# Patient Record
Sex: FEMALE | Race: WHITE | NOT HISPANIC OR LATINO | Employment: PART TIME | URBAN - METROPOLITAN AREA
[De-identification: names, ages, dates, MRNs, and addresses within clinical notes are randomized per-mention and may not be internally consistent; named-entity substitution may affect disease eponyms.]

---

## 2017-06-07 ENCOUNTER — APPOINTMENT (OUTPATIENT)
Dept: PREADMISSION TESTING | Facility: HOSPITAL | Age: 32
End: 2017-06-07

## 2017-06-07 VITALS
TEMPERATURE: 100 F | RESPIRATION RATE: 16 BRPM | HEART RATE: 81 BPM | BODY MASS INDEX: 26.66 KG/M2 | DIASTOLIC BLOOD PRESSURE: 79 MMHG | OXYGEN SATURATION: 100 % | SYSTOLIC BLOOD PRESSURE: 113 MMHG | HEIGHT: 65 IN | WEIGHT: 160 LBS

## 2017-06-07 LAB
ANION GAP SERPL CALCULATED.3IONS-SCNC: 14.5 MMOL/L
BUN BLD-MCNC: 11 MG/DL (ref 6–20)
BUN/CREAT SERPL: 13.1 (ref 7–25)
CALCIUM SPEC-SCNC: 9.4 MG/DL (ref 8.6–10.5)
CHLORIDE SERPL-SCNC: 100 MMOL/L (ref 98–107)
CO2 SERPL-SCNC: 25.5 MMOL/L (ref 22–29)
CREAT BLD-MCNC: 0.84 MG/DL (ref 0.57–1)
DEPRECATED RDW RBC AUTO: 39.6 FL (ref 37–54)
ERYTHROCYTE [DISTWIDTH] IN BLOOD BY AUTOMATED COUNT: 13.1 % (ref 11.7–13)
GFR SERPL CREATININE-BSD FRML MDRD: 78 ML/MIN/1.73
GLUCOSE BLD-MCNC: 81 MG/DL (ref 65–99)
HCT VFR BLD AUTO: 38.5 % (ref 35.6–45.5)
HGB BLD-MCNC: 13 G/DL (ref 11.9–15.5)
MCH RBC QN AUTO: 28.5 PG (ref 26.9–32)
MCHC RBC AUTO-ENTMCNC: 33.8 G/DL (ref 32.4–36.3)
MCV RBC AUTO: 84.4 FL (ref 80.5–98.2)
PLATELET # BLD AUTO: 233 10*3/MM3 (ref 140–500)
PMV BLD AUTO: 10.5 FL (ref 6–12)
POTASSIUM BLD-SCNC: 4.1 MMOL/L (ref 3.5–5.2)
RBC # BLD AUTO: 4.56 10*6/MM3 (ref 3.9–5.2)
SODIUM BLD-SCNC: 140 MMOL/L (ref 136–145)
WBC NRBC COR # BLD: 5.99 10*3/MM3 (ref 4.5–10.7)

## 2017-06-07 PROCEDURE — 80048 BASIC METABOLIC PNL TOTAL CA: CPT | Performed by: OBSTETRICS & GYNECOLOGY

## 2017-06-07 PROCEDURE — 85027 COMPLETE CBC AUTOMATED: CPT | Performed by: OBSTETRICS & GYNECOLOGY

## 2017-06-07 PROCEDURE — 36415 COLL VENOUS BLD VENIPUNCTURE: CPT

## 2017-06-07 RX ORDER — MINOCYCLINE HYDROCHLORIDE 100 MG/1
100 CAPSULE ORAL 2 TIMES DAILY
COMMUNITY
End: 2021-10-11

## 2017-06-07 RX ORDER — LEVOMEFOLATE CALCIUM 15 MG
15 TABLET ORAL DAILY
COMMUNITY
End: 2023-01-13

## 2017-06-07 RX ORDER — OXYBUTYNIN CHLORIDE 10 MG/1
10 TABLET, EXTENDED RELEASE ORAL DAILY
COMMUNITY
End: 2021-10-11

## 2017-06-07 RX ORDER — VENLAFAXINE 75 MG/1
75 TABLET ORAL DAILY
COMMUNITY
End: 2021-10-11

## 2017-06-07 RX ORDER — ALBUTEROL SULFATE 90 UG/1
2 AEROSOL, METERED RESPIRATORY (INHALATION) EVERY 4 HOURS PRN
COMMUNITY
End: 2021-10-11 | Stop reason: SDUPTHER

## 2017-06-07 RX ORDER — BUTALBITAL, ACETAMINOPHEN AND CAFFEINE 50; 325; 40 MG/1; MG/1; MG/1
1 TABLET ORAL EVERY 4 HOURS PRN
COMMUNITY
End: 2021-09-08 | Stop reason: SDUPTHER

## 2017-06-07 NOTE — DISCHARGE INSTRUCTIONS
Take the following medications the morning of surgery with a small sip of water:    QVAR  ALBUTEROL     General Instructions:  • Do not eat solid food after midnight the night before surgery.  • You may drink clear liquids day of surgery but must stop at least one hour before your hospital arrival time @ 0430 AM STOP DRINKING!!  • It is beneficial for you to have a clear drink that contains carbohydrates the day of surgery.  We suggest a 20 ounce bottle of Gatorade or Powerade for non-diabetic patients or a 20 ounce bottle of G2 or Powerade Zero for diabetic patients. (Pediatric patients, are not advised to drink a 20 ounce carbohydrate drink)    Clear liquids are liquids you can see through.  Nothing red in color.     Plain water                               Sports drinks  Sodas                                   Gelatin (Jell-O)  Fruit juices without pulp such as white grape juice and apple juice  Popsicles that contain no fruit or yogurt  Tea or coffee (no cream or milk added)  Gatorade / Powerade  G2 / Powerade Zero    • Patients who avoid smoking, chewing tobacco and alcohol for 4 weeks prior to surgery have a reduced risk of post-operative complications.  Quit smoking as many days before surgery as you can.  • Do not smoke, use chewing tobacco or drink alcohol the day of surgery. .  • Bring any papers given to you in the doctor’s office.  • Wear clean comfortable clothes and socks.  • Do not wear contact lenses or make-up.  Bring a case for your glasses.   • Bring crutches or walker if applicable.  • Leave all other valuables and jewelry at home.  • The Pre-Admission Testing nurse will instruct you to bring medications if unable to obtain an accurate list in Pre-Admission Testing.        Preventing a Surgical Site Infection:  • For 2 to 3 days before surgery, avoid shaving with a razor because the razor can irritate skin and make it easier to develop an infection.  • The night prior to surgery sleep in a clean  bed with clean clothing.  Do not allow pets to sleep with you.  • Shower on the morning of surgery using a fresh bar of anti-bacterial soap (such as Dial) and clean washcloth.  Dry with a clean towel and dress in clean clothing.  • Ask your surgeon if you will be receiving antibiotics prior to surgery.  • Make sure you, your family, and all healthcare providers clean their hands with soap and water or an alcohol based hand  before caring for you or your wound.    Day of surgery:06- ARRIVE @ 0530 AM REPORT TO THE MAIN OR   Upon arrival, a Pre-op nurse and Anesthesiologist will review your health history, obtain vital signs, and answer questions you may have.  The only belongings needed at this time will be your home medications.  If you are staying overnight your family can leave the rest of your belongings in the car and bring them to your room later.  A Pre-op nurse will start an IV and you may receive medication in preparation for surgery, including something to help you relax.  Your family will be able to see you in the Pre-op area.  While you are in surgery your family should notify the waiting room  if they leave the waiting room area and provide a contact phone number.    Please be aware that surgery does come with discomfort.  We want to make every effort to control your discomfort so please discuss any uncontrolled symptoms with your nurse.   Your doctor will most likely have prescribed pain medications.      If you are going home after surgery you will receive individualized written care instructions before being discharged.  A responsible adult must drive you to and from the hospital on the day of your surgery and stay with you for 24 hours.    If you are staying overnight following surgery, you will be transported to your hospital room following the recovery period.  [facility] has all private rooms.    If you have any questions please call Pre-Admission Testing  at [phone].  Deductibles and co-payments are collected on the day of service. Please be prepared to pay the required co-pay, deductible or deposit on the day of service as defined by your plan.

## 2017-06-14 RX ORDER — CEFAZOLIN SODIUM 2 G/100ML
2 INJECTION, SOLUTION INTRAVENOUS ONCE
Status: COMPLETED | OUTPATIENT
Start: 2017-06-15 | End: 2017-06-15

## 2017-06-15 ENCOUNTER — HOSPITAL ENCOUNTER (OUTPATIENT)
Facility: HOSPITAL | Age: 32
Setting detail: HOSPITAL OUTPATIENT SURGERY
Discharge: HOME | End: 2017-06-15
Attending: OBSTETRICS & GYNECOLOGY | Admitting: OBSTETRICS & GYNECOLOGY

## 2017-06-15 ENCOUNTER — ANESTHESIA (OUTPATIENT)
Dept: PERIOP | Facility: HOSPITAL | Age: 32
End: 2017-06-15

## 2017-06-15 ENCOUNTER — ANESTHESIA EVENT (OUTPATIENT)
Dept: PERIOP | Facility: HOSPITAL | Age: 32
End: 2017-06-15

## 2017-06-15 VITALS
DIASTOLIC BLOOD PRESSURE: 79 MMHG | SYSTOLIC BLOOD PRESSURE: 120 MMHG | BODY MASS INDEX: 26.57 KG/M2 | RESPIRATION RATE: 16 BRPM | OXYGEN SATURATION: 97 % | HEIGHT: 65 IN | HEART RATE: 99 BPM | WEIGHT: 159.5 LBS | TEMPERATURE: 97.6 F

## 2017-06-15 PROCEDURE — 25010000003 CEFAZOLIN IN DEXTROSE 2-4 GM/100ML-% SOLUTION: Performed by: OBSTETRICS & GYNECOLOGY

## 2017-06-15 PROCEDURE — 25010000002 MIDAZOLAM PER 1 MG: Performed by: ANESTHESIOLOGY

## 2017-06-15 PROCEDURE — 25010000002 NEOSTIGMINE PER 0.5 MG: Performed by: NURSE ANESTHETIST, CERTIFIED REGISTERED

## 2017-06-15 PROCEDURE — 25010000002 FENTANYL CITRATE (PF) 100 MCG/2ML SOLUTION: Performed by: NURSE ANESTHETIST, CERTIFIED REGISTERED

## 2017-06-15 PROCEDURE — 25010000002 PROMETHAZINE PER 50 MG: Performed by: NURSE ANESTHETIST, CERTIFIED REGISTERED

## 2017-06-15 PROCEDURE — 25010000002 MEPERIDINE 25 MG/0.5ML SOLUTION: Performed by: NURSE ANESTHETIST, CERTIFIED REGISTERED

## 2017-06-15 PROCEDURE — 25010000002 PROPOFOL 10 MG/ML EMULSION: Performed by: NURSE ANESTHETIST, CERTIFIED REGISTERED

## 2017-06-15 PROCEDURE — 25010000002 HYDROMORPHONE PER 4 MG: Performed by: NURSE ANESTHETIST, CERTIFIED REGISTERED

## 2017-06-15 PROCEDURE — 25010000002 ONDANSETRON PER 1 MG: Performed by: NURSE ANESTHETIST, CERTIFIED REGISTERED

## 2017-06-15 PROCEDURE — C1771 REP DEV, URINARY, W/SLING: HCPCS | Performed by: OBSTETRICS & GYNECOLOGY

## 2017-06-15 PROCEDURE — 25010000002 EPINEPHRINE 1 MG/ML SOLUTION: Performed by: OBSTETRICS & GYNECOLOGY

## 2017-06-15 PROCEDURE — 25010000002 KETOROLAC TROMETHAMINE PER 15 MG

## 2017-06-15 DEVICE — IMPLANTABLE DEVICE: Type: IMPLANTABLE DEVICE | Status: FUNCTIONAL

## 2017-06-15 RX ORDER — FENTANYL CITRATE 50 UG/ML
INJECTION, SOLUTION INTRAMUSCULAR; INTRAVENOUS AS NEEDED
Status: DISCONTINUED | OUTPATIENT
Start: 2017-06-15 | End: 2017-06-15 | Stop reason: SURG

## 2017-06-15 RX ORDER — FENTANYL CITRATE 50 UG/ML
50 INJECTION, SOLUTION INTRAMUSCULAR; INTRAVENOUS
Status: DISCONTINUED | OUTPATIENT
Start: 2017-06-15 | End: 2017-06-15 | Stop reason: HOSPADM

## 2017-06-15 RX ORDER — GLYCOPYRROLATE 0.2 MG/ML
INJECTION INTRAMUSCULAR; INTRAVENOUS AS NEEDED
Status: DISCONTINUED | OUTPATIENT
Start: 2017-06-15 | End: 2017-06-15 | Stop reason: SURG

## 2017-06-15 RX ORDER — PROPOFOL 10 MG/ML
VIAL (ML) INTRAVENOUS AS NEEDED
Status: DISCONTINUED | OUTPATIENT
Start: 2017-06-15 | End: 2017-06-15 | Stop reason: SURG

## 2017-06-15 RX ORDER — ONDANSETRON 2 MG/ML
4 INJECTION INTRAMUSCULAR; INTRAVENOUS ONCE AS NEEDED
Status: DISCONTINUED | OUTPATIENT
Start: 2017-06-15 | End: 2017-06-15 | Stop reason: HOSPADM

## 2017-06-15 RX ORDER — MIDAZOLAM HYDROCHLORIDE 1 MG/ML
1 INJECTION INTRAMUSCULAR; INTRAVENOUS
Status: DISCONTINUED | OUTPATIENT
Start: 2017-06-15 | End: 2017-06-15 | Stop reason: HOSPADM

## 2017-06-15 RX ORDER — MIDAZOLAM HYDROCHLORIDE 1 MG/ML
2 INJECTION INTRAMUSCULAR; INTRAVENOUS
Status: DISCONTINUED | OUTPATIENT
Start: 2017-06-15 | End: 2017-06-15 | Stop reason: HOSPADM

## 2017-06-15 RX ORDER — FAMOTIDINE 10 MG/ML
20 INJECTION, SOLUTION INTRAVENOUS ONCE
Status: COMPLETED | OUTPATIENT
Start: 2017-06-15 | End: 2017-06-15

## 2017-06-15 RX ORDER — HYDROCODONE BITARTRATE AND ACETAMINOPHEN 5; 325 MG/1; MG/1
1-2 TABLET ORAL EVERY 4 HOURS PRN
Qty: 40 TABLET | Refills: 0 | OUTPATIENT
Start: 2017-06-15 | End: 2017-06-15 | Stop reason: HOSPADM

## 2017-06-15 RX ORDER — HYDROCODONE BITARTRATE AND ACETAMINOPHEN 5; 325 MG/1; MG/1
1 TABLET ORAL ONCE AS NEEDED
Status: COMPLETED | OUTPATIENT
Start: 2017-06-15 | End: 2017-06-15

## 2017-06-15 RX ORDER — PROMETHAZINE HYDROCHLORIDE 25 MG/ML
12.5 INJECTION, SOLUTION INTRAMUSCULAR; INTRAVENOUS ONCE AS NEEDED
Status: COMPLETED | OUTPATIENT
Start: 2017-06-15 | End: 2017-06-15

## 2017-06-15 RX ORDER — PROMETHAZINE HYDROCHLORIDE 25 MG/ML
6.25 INJECTION, SOLUTION INTRAMUSCULAR; INTRAVENOUS ONCE AS NEEDED
Status: DISCONTINUED | OUTPATIENT
Start: 2017-06-15 | End: 2017-06-15 | Stop reason: HOSPADM

## 2017-06-15 RX ORDER — KETOROLAC TROMETHAMINE 30 MG/ML
INJECTION, SOLUTION INTRAMUSCULAR; INTRAVENOUS
Status: COMPLETED
Start: 2017-06-15 | End: 2017-06-15

## 2017-06-15 RX ORDER — PROMETHAZINE HYDROCHLORIDE 25 MG/1
25 SUPPOSITORY RECTAL ONCE AS NEEDED
Status: DISCONTINUED | OUTPATIENT
Start: 2017-06-15 | End: 2017-06-15 | Stop reason: HOSPADM

## 2017-06-15 RX ORDER — PROMETHAZINE HYDROCHLORIDE 25 MG/1
25 TABLET ORAL ONCE AS NEEDED
Status: COMPLETED | OUTPATIENT
Start: 2017-06-15 | End: 2017-06-15

## 2017-06-15 RX ORDER — PROMETHAZINE HYDROCHLORIDE 25 MG/1
25 TABLET ORAL ONCE AS NEEDED
Status: DISCONTINUED | OUTPATIENT
Start: 2017-06-15 | End: 2017-06-15 | Stop reason: HOSPADM

## 2017-06-15 RX ORDER — LIDOCAINE HYDROCHLORIDE 20 MG/ML
INJECTION, SOLUTION INFILTRATION; PERINEURAL AS NEEDED
Status: DISCONTINUED | OUTPATIENT
Start: 2017-06-15 | End: 2017-06-15 | Stop reason: SURG

## 2017-06-15 RX ORDER — PROMETHAZINE HYDROCHLORIDE 25 MG/1
25 SUPPOSITORY RECTAL ONCE AS NEEDED
Status: COMPLETED | OUTPATIENT
Start: 2017-06-15 | End: 2017-06-15

## 2017-06-15 RX ORDER — LABETALOL HYDROCHLORIDE 5 MG/ML
5 INJECTION, SOLUTION INTRAVENOUS
Status: DISCONTINUED | OUTPATIENT
Start: 2017-06-15 | End: 2017-06-15 | Stop reason: HOSPADM

## 2017-06-15 RX ORDER — SODIUM CHLORIDE, SODIUM LACTATE, POTASSIUM CHLORIDE, CALCIUM CHLORIDE 600; 310; 30; 20 MG/100ML; MG/100ML; MG/100ML; MG/100ML
9 INJECTION, SOLUTION INTRAVENOUS CONTINUOUS
Status: DISCONTINUED | OUTPATIENT
Start: 2017-06-15 | End: 2017-06-15 | Stop reason: HOSPADM

## 2017-06-15 RX ORDER — SODIUM CHLORIDE, SODIUM LACTATE, POTASSIUM CHLORIDE, CALCIUM CHLORIDE 600; 310; 30; 20 MG/100ML; MG/100ML; MG/100ML; MG/100ML
100 INJECTION, SOLUTION INTRAVENOUS CONTINUOUS
Status: DISCONTINUED | OUTPATIENT
Start: 2017-06-15 | End: 2017-06-15 | Stop reason: HOSPADM

## 2017-06-15 RX ORDER — KETOROLAC TROMETHAMINE 30 MG/ML
30 INJECTION, SOLUTION INTRAMUSCULAR; INTRAVENOUS EVERY 6 HOURS PRN
Status: COMPLETED | OUTPATIENT
Start: 2017-06-15 | End: 2017-06-15

## 2017-06-15 RX ORDER — HYDRALAZINE HYDROCHLORIDE 20 MG/ML
5 INJECTION INTRAMUSCULAR; INTRAVENOUS
Status: DISCONTINUED | OUTPATIENT
Start: 2017-06-15 | End: 2017-06-15 | Stop reason: HOSPADM

## 2017-06-15 RX ORDER — ALBUTEROL SULFATE 2.5 MG/3ML
2.5 SOLUTION RESPIRATORY (INHALATION) ONCE AS NEEDED
Status: DISCONTINUED | OUTPATIENT
Start: 2017-06-15 | End: 2017-06-15 | Stop reason: HOSPADM

## 2017-06-15 RX ORDER — ROCURONIUM BROMIDE 10 MG/ML
INJECTION, SOLUTION INTRAVENOUS AS NEEDED
Status: DISCONTINUED | OUTPATIENT
Start: 2017-06-15 | End: 2017-06-15 | Stop reason: SURG

## 2017-06-15 RX ORDER — HYDROMORPHONE HYDROCHLORIDE 1 MG/ML
0.5 INJECTION, SOLUTION INTRAMUSCULAR; INTRAVENOUS; SUBCUTANEOUS
Status: DISCONTINUED | OUTPATIENT
Start: 2017-06-15 | End: 2017-06-15 | Stop reason: HOSPADM

## 2017-06-15 RX ORDER — SODIUM CHLORIDE 0.9 % (FLUSH) 0.9 %
1-10 SYRINGE (ML) INJECTION AS NEEDED
Status: DISCONTINUED | OUTPATIENT
Start: 2017-06-15 | End: 2017-06-15 | Stop reason: HOSPADM

## 2017-06-15 RX ORDER — ONDANSETRON 2 MG/ML
INJECTION INTRAMUSCULAR; INTRAVENOUS AS NEEDED
Status: DISCONTINUED | OUTPATIENT
Start: 2017-06-15 | End: 2017-06-15 | Stop reason: SURG

## 2017-06-15 RX ORDER — OXYCODONE HYDROCHLORIDE AND ACETAMINOPHEN 5; 325 MG/1; MG/1
1 TABLET ORAL EVERY 4 HOURS PRN
Qty: 40 TABLET | Refills: 0 | Status: SHIPPED | OUTPATIENT
Start: 2017-06-15 | End: 2018-06-15

## 2017-06-15 RX ORDER — EPINEPHRINE 1 MG/ML
INJECTION INTRAMUSCULAR; INTRAVENOUS; SUBCUTANEOUS AS NEEDED
Status: DISCONTINUED | OUTPATIENT
Start: 2017-06-15 | End: 2017-06-15 | Stop reason: HOSPADM

## 2017-06-15 RX ADMIN — FENTANYL CITRATE 50 MCG: 50 INJECTION INTRAMUSCULAR; INTRAVENOUS at 09:55

## 2017-06-15 RX ADMIN — SODIUM CHLORIDE, POTASSIUM CHLORIDE, SODIUM LACTATE AND CALCIUM CHLORIDE: 600; 310; 30; 20 INJECTION, SOLUTION INTRAVENOUS at 08:56

## 2017-06-15 RX ADMIN — ONDANSETRON 4 MG: 2 INJECTION INTRAMUSCULAR; INTRAVENOUS at 08:59

## 2017-06-15 RX ADMIN — ROCURONIUM BROMIDE 35 MG: 10 INJECTION INTRAVENOUS at 07:50

## 2017-06-15 RX ADMIN — NEOSTIGMINE METHYLSULFATE 4 MG: 1 INJECTION INTRAMUSCULAR; INTRAVENOUS; SUBCUTANEOUS at 08:59

## 2017-06-15 RX ADMIN — PROMETHAZINE HYDROCHLORIDE 12.5 MG: 25 INJECTION, SOLUTION INTRAMUSCULAR; INTRAVENOUS at 12:12

## 2017-06-15 RX ADMIN — LIDOCAINE HYDROCHLORIDE 60 MG: 20 INJECTION, SOLUTION INFILTRATION; PERINEURAL at 07:50

## 2017-06-15 RX ADMIN — SODIUM CHLORIDE, POTASSIUM CHLORIDE, SODIUM LACTATE AND CALCIUM CHLORIDE 9 ML/HR: 600; 310; 30; 20 INJECTION, SOLUTION INTRAVENOUS at 06:54

## 2017-06-15 RX ADMIN — FAMOTIDINE 20 MG: 10 INJECTION, SOLUTION INTRAVENOUS at 06:54

## 2017-06-15 RX ADMIN — FENTANYL CITRATE 50 MCG: 50 INJECTION INTRAMUSCULAR; INTRAVENOUS at 09:47

## 2017-06-15 RX ADMIN — KETOROLAC TROMETHAMINE 30 MG: 30 INJECTION, SOLUTION INTRAMUSCULAR; INTRAVENOUS at 10:49

## 2017-06-15 RX ADMIN — HYDROCODONE BITARTRATE AND ACETAMINOPHEN 1 TABLET: 5; 325 TABLET ORAL at 09:40

## 2017-06-15 RX ADMIN — FENTANYL CITRATE 100 MCG: 50 INJECTION INTRAMUSCULAR; INTRAVENOUS at 07:50

## 2017-06-15 RX ADMIN — MEPERIDINE HYDROCHLORIDE 12.5 MG: 25 INJECTION, SOLUTION INTRAMUSCULAR; INTRAVENOUS; SUBCUTANEOUS at 10:03

## 2017-06-15 RX ADMIN — MIDAZOLAM 2 MG: 1 INJECTION INTRAMUSCULAR; INTRAVENOUS at 06:54

## 2017-06-15 RX ADMIN — PROPOFOL 170 MG: 10 INJECTION, EMULSION INTRAVENOUS at 07:50

## 2017-06-15 RX ADMIN — CEFAZOLIN SODIUM 2 G: 2 INJECTION, SOLUTION INTRAVENOUS at 07:43

## 2017-06-15 RX ADMIN — GLYCOPYRROLATE 0.6 MG: 0.2 INJECTION INTRAMUSCULAR; INTRAVENOUS at 08:59

## 2017-06-15 RX ADMIN — KETOROLAC TROMETHAMINE 30 MG: 30 INJECTION, SOLUTION INTRAMUSCULAR at 10:49

## 2017-06-15 RX ADMIN — HYDROMORPHONE HYDROCHLORIDE 0.5 MG: 1 INJECTION, SOLUTION INTRAMUSCULAR; INTRAVENOUS; SUBCUTANEOUS at 10:13

## 2017-06-15 RX ADMIN — HYDROMORPHONE HYDROCHLORIDE 0.5 MG: 1 INJECTION, SOLUTION INTRAMUSCULAR; INTRAVENOUS; SUBCUTANEOUS at 10:38

## 2017-06-15 RX ADMIN — MEPERIDINE HYDROCHLORIDE 12.5 MG: 25 INJECTION, SOLUTION INTRAMUSCULAR; INTRAVENOUS; SUBCUTANEOUS at 09:40

## 2017-06-15 RX ADMIN — HYDROMORPHONE HYDROCHLORIDE 0.5 MG: 1 INJECTION, SOLUTION INTRAMUSCULAR; INTRAVENOUS; SUBCUTANEOUS at 10:24

## 2017-06-15 NOTE — ANESTHESIA POSTPROCEDURE EVALUATION
Patient: Audra SHELLOSA    Procedure Summary     Date Anesthesia Start Anesthesia Stop Room / Location    06/15/17 0743 0921  [facility] / [facility]       Procedure Diagnosis Surgeon Provider    RETROPUBIC SLING W/CYSTOSCOPY  (N/A Vagina); DILATATION AND CURETTAGE HYSTEROSCOPY NOVASURE ENDOMETRIAL ABLATION (N/A Vagina) No diagnosis on file. MD William Lopez MD          Anesthesia Type: general  Last vitals  /64 (06/15/17 1155)    Temp      Pulse 94 (06/15/17 1155)   Resp 16 (06/15/17 1155)    SpO2 96 % (06/15/17 1140)      Post Anesthesia Care and Evaluation    Patient location during evaluation: PACU  Patient participation: complete - patient participated  Level of consciousness: sleepy but conscious  Pain score: 0  Pain management: adequate  Airway patency: patent  Anesthetic complications: No anesthetic complications    Cardiovascular status: acceptable  Respiratory status: acceptable  Hydration status: acceptable

## 2017-06-15 NOTE — PLAN OF CARE
Problem: Perioperative Period (Adult)  Goal: Signs and Symptoms of Listed Potential Problems Will be Absent or Manageable (Perioperative Period)  Outcome: Ongoing (interventions implemented as appropriate)  Patient arrived to PACU in good condition. Oral airway in place with blowby oxygen. Vital signs stable. No signs/symptoms of pain at this time. Will continue to monitor.

## 2017-06-15 NOTE — DISCHARGE INSTRUCTIONS
You were given Norco (pain pills) today at 9:40 am.  You may have the first dose of Roxicet at 1:40 pm today.    Outpatient Surgery Guidelines, Adult  Outpatient procedures are those for which the person having the procedure is allowed to go home the same day as the procedure. Various procedures are done on an outpatient basis. You should follow some general guidelines if you will be having an outpatient procedure.  AFTER THE  PROCEDURE  After surgery, you will be taken to a recovery area, where your progress will be monitored. If there are no complications, you will be allowed to go home when you are awake, stable, and taking fluids well. You may have numbness around the surgical site. Healing will take some time. You will have tenderness at the surgical site and may have some swelling and bruising. You may also have some nausea.  HOME CARE INSTRUCTIONS  · Do not drive for 24 hours, or as directed by your health care provider. Do not drive while taking prescription pain medicines.  · Do not drink alcohol for 24 hours.  · Do not make important decisions or sign legal documents for 24 hours.  · Plan on having a responsible adult stay with your for 24 hours following your procedure.  · You may resume a normal diet and activities as directed.  · Do not lift anything heavier than 10 pounds (4.5 kg) or play contact sports until your health care provider says it is okay.  · Only take over-the-counter or prescription medicines as directed by your health care provider.  · Follow up with your health care provider as directed.  SEEK MEDICAL CARE IF:  · You have increased bleeding (more than a small spot) from the surgical site.  · You have redness, swelling, or increasing pain in the wound.  · You see pus coming from the wound.  · You have a fever > 101.  · You notice a bad smell coming from the wound or dressing.  · You feel lightheaded or faint.  · You develop a rash.  · You have trouble breathing.  · You develop  allergies.  MAKE SURE YOU:  · Understand these instructions.  · Will watch your condition.  · Will get help right away if you are not doing well or get worse.

## 2017-06-15 NOTE — H&P
Chief complaint:  Abnormal uterine bleeding and stress urinary incontinence    History of present illness:  The patient is a 32-year-old P4 premenopausal female with a long standing history of abnormal uterine bleeding who has failed multiple medical therapy or self discontinued medical therapy due to adverse side effects. Pelvic ultrasound showed no uterine fibroids.   Also, she is complaining of leakage of urine with cough, laugh, sneeze, and physical activities for the past two years.  The leakage of urine is affecting her quality of life.  She had urodynamic testing, with urethral pressure consistent with intrinsic sphincter deficiency.   Patient wanted to proceed with the convservative surgical management with preservation of uterus, so she chose endometrial ablation.  She expressed understanding of the risks involved.  She has had bilateral salpingectomy for sterilization and expressed that she has completed her childbearing and does not desire future fertility.  Also, will place a retropubic mid-urethral sling.      Review of Systems - General ROS: negative for - chills, fatigue, fever, weight gain or weight loss  Psychological ROS: negative for - suicidal ideations  Respiratory ROS: no cough, shortness of breath, or wheezing  Cardiovascular ROS: no chest pain or dyspnea on exertion  Gastrointestinal ROS: no abdominal pain, change in bowel habits, or black or bloody stools  Genito-Urinary ROS: no dysuria, trouble voiding, or hematuria, positive for incontinence and heavy menstrual bleeding  Musculoskeletal ROS: negative for - gait disturbance, muscle pain or muscular weakness  Neurological ROS: no TIA or stroke symptoms    Past Medical History:   Diagnosis Date   • Anxiety and depression    • Asthma    • Bipolar 1 disorder    • Cystic acne    • Intrinsic sphincter deficiency (ISD)    • Iron deficiency    • Migraine    • Stress incontinence    • Thyroid dysfunction, postpartum (-OX- AGAIN)  (3-2014-TO )    RESOLVED     Past Surgical History:   Procedure Laterality Date   • DILATATION AND CURETTAGE     • LAPAROSCOPIC CHOLECYSTECTOMY  02/2007   • TUBAL ABDOMINAL LIGATION       No current facility-administered medications for this encounter.     Current Outpatient Prescriptions:   •  albuterol (PROVENTIL HFA;VENTOLIN HFA) 108 (90 BASE) MCG/ACT inhaler, Inhale 2 puffs Every 4 (Four) Hours As Needed for Wheezing., Disp: , Rfl:   •  beclomethasone (QVAR) 80 MCG/ACT inhaler, Inhale 1 puff 2 (Two) Times a Day., Disp: , Rfl:   •  butalbital-acetaminophen-caffeine (FIORICET) -40 MG per tablet, Take 1 tablet by mouth Every 4 (Four) Hours As Needed for Headache., Disp: , Rfl:   •  l-methylfolate 15 MG tablet tablet, Take 15 mg by mouth Daily., Disp: , Rfl:   •  minocycline (MINOCIN,DYNACIN) 100 MG capsule, Take 100 mg by mouth 2 (Two) Times a Day., Disp: , Rfl:   •  oxybutynin XL (DITROPAN-XL) 10 MG 24 hr tablet, Take 10 mg by mouth Daily., Disp: , Rfl:   •  Pediatric Multivitamins-Iron (FLINTSTONES PLUS IRON) chewable tablet, Chew 1 tablet Daily. HOLD PRIOR TO SURGERY, Disp: , Rfl:   •  venlafaxine (EFFEXOR) 75 MG tablet, Take 75 mg by mouth Daily., Disp: , Rfl:      No Known Allergies     Family History   Problem Relation Age of Onset   • Malig Hyperthermia Neg Hx      Social History     Social History   • Marital status: Legally      Spouse name: N/A   • Number of children: N/A   • Years of education: N/A     Occupational History   • Not on file.     Social History Main Topics   • Smoking status: Former Smoker     Packs/day: 1.50     Years: 6.00     Types: Cigarettes     Quit date: 6/7/2011   • Smokeless tobacco: Not on file   • Alcohol use Yes      Comment: SPECIAL OCCASION DRINKER   • Drug use: Defer   • Sexual activity: Defer     Other Topics Concern   • Not on file     Social History Narrative   • No narrative on file       Physical Exam    General:  No acute distress  Head:   Atraumatic and normocephalic  Neck:  Supple  Cardiovascular:  RRR, S1 and S2 normal  Lungs:  Clear to auscultation bilaterally  Abdomen:  Soft, nontentder, non-distended, normal bowel sounds  Genitourinary:  deferred  Musculoskeletal:  No edema or calf tenderness.  Normal sensation and moved all fingers and toes, normal strength  Neuroglogical:  AAO x 3  Psychological:  Appropriate mood and affect    Assessment and Plan    32-year-old P4 with abnormal uterine bleeding NOS and stress urinary incontinence due to intrinsic sphincter deficiency    Prophylactic antibiotics  SCDs  Verify consent  I have reviewed the notes, assessments, and/or procedures performed by Dr Wolfe, I concur with her/his documentation of Audra MCLEOD.

## 2017-06-15 NOTE — PLAN OF CARE
Problem: Patient Care Overview (Adult)  Goal: Plan of Care Review  Outcome: Ongoing (interventions implemented as appropriate)    06/15/17 0641   Coping/Psychosocial Response Interventions   Plan Of Care Reviewed With patient   Patient Care Overview   Progress no change       Goal: Adult Individualization and Mutuality  Outcome: Ongoing (interventions implemented as appropriate)    06/15/17 0641   Individualization   Patient Specific Preferences CAll Audra   Patient Specific Goals return home, periods more normal, normal urine flow   Patient Specific Interventions teach S&S of infection   Mutuality/Individual Preferences   What Anxieties, Fears or Concerns Do You Have About Your Health or Care? none   What Questions Do You Have About Your Health or Care? none   What Information Would Help Us Give You More Personalized Care? none       Goal: Discharge Needs Assessment  Outcome: Ongoing (interventions implemented as appropriate)    06/15/17 0641   Discharge Needs Assessment   Concerns To Be Addressed denies needs/concerns at this time   Readmission Within The Last 30 Days no previous admission in last 30 days   Current Health   Anticipated Changes Related to Illness none   Self-Care   Equipment Currently Used at Home none         Problem: Perioperative Period (Adult)  Goal: Signs and Symptoms of Listed Potential Problems Will be Absent or Manageable (Perioperative Period)  Outcome: Ongoing (interventions implemented as appropriate)    06/15/17 0641   Perioperative Period   Problems Assessed (Perioperative Period) pain;hypoxia/hypoxemia;situational response   Problems Present (Perioperative Period) none

## 2017-06-15 NOTE — OP NOTE
DATE OF PROCEDURE:  06/15/2017    PREOPERATIVE DIAGNOSES:  1.  Menorrhagia.  2.  Intrinsic sphincter deficiency.    POSTOPERATIVE DIAGNOSES:   1.  Menorrhagia.  2.  Intrinsic sphincter deficiency.    PROCEDURES PERFORMED:  1.  Retropubic midurethral sling.   2.  Dilatation and curettage.    3.  Hysteroscopy.    4.  NovaSure endometrial ablation.     SURGEON: Hebert Wallace MD    ANESTHESIA: General.      ASSISTANTS:  1.  Wily Zapata  2.   Antonette Vieira.     ESTIMATED BLOOD LOSS:  Minimal.      URINE VOIDED:  100 mL.     SPECIMENS: None.     DRAINS: None.     FINDINGS: Normal appearing endometrial.  The endometrium was sounded to 10 cm, length of cavity was 6.5 cm, width was 4.5 cm, power 161 sun, and time was 1 minute and 14 seconds.     COMPLICATIONS: None.     DESCRIPTION OF PROCEDURE: Once risks, benefits, and alternatives were discussed with the patient and consent was obtained, the patient was taken to the operating room with IV fluids running and placed on the operating room table in the supine position. Anesthesia was obtained. Anesthesia was felt to be adequate.  She was placed in lithotomy position using the Sundeep stirrups, prepped, and draped in the usual sterile fashion for vaginal surgery. Allis clamp was attached to the anterior lip of the cervix. Uterus was sounded to total of 10 cm.  Cervix was dilated.  Hysteroscope inserted. Both ostia were observed.  The uterine lining noted to be pretty normal. The hysteroscope was used to measure the length of the cavity. It was noted to be 6.5 cm. At that point, the hysteroscope was removed. The NovaSure was inserted without difficulty.  Procedure was completed without issue.  Once this was completed, we proceeded onto the retropubic sling. After injecting suprapubically the space of Retzius with epinephrine and saline solution, we then injected transvaginally. Next a 1 cm incision was made between 2 Allis clamps that were placed at the midurethra  through the vaginal mucosa. Metzenbaum scissors were used to dissect bilaterally to the pubic symphysis.  Then the AMS Retropubic RetroArc sling was used. It was placed into the vaginal incision and retrogression relay delivered it to the abdominal wall suprapubically. The same was then performed on the opposite side. Cystoscopy was performed at this point with no evidence of perforation of the bladder. Next, the mesh was attached. A large packing forceps was placed between the urethra and the mesh to ensure tension-free placement. The forceps were left in place until the plastic sheath was removed. The vaginal incision was then closed out in 2 layers, one with a mattress suture of 2-0 Vicryl suture followed by a running 3-0 Vicryl suture. These adhesions were closed with a Monocryl followed by a Steri-Strip. At the conclusion, there was excellent hemostasis. Sponge, instrument, and lap counts were correct x2. Patient was taken out of lithotomy position, awakened from anesthesia, and sent to the recovery room in excellent condition.       REYMUNDO Lopez:elvin  D:   06/15/2017 09:14:21  T:   06/15/2017 09:37:18  Job ID:   [id number]  Document ID:   [id number]  cc:

## 2017-06-15 NOTE — PLAN OF CARE
Problem: Patient Care Overview (Adult)  Goal: Plan of Care Review  Outcome: Outcome(s) achieved Date Met:  06/15/17    06/15/17 1459   Coping/Psychosocial Response Interventions   Plan Of Care Reviewed With patient;mother   Patient Care Overview   Progress improving   Outcome Evaluation   Outcome Summary/Follow up Plan ready for discharge       Goal: Adult Individualization and Mutuality  Outcome: Outcome(s) achieved Date Met:  06/15/17  Goal: Discharge Needs Assessment  Outcome: Outcome(s) achieved Date Met:  06/15/17    Problem: Perioperative Period (Adult)  Goal: Signs and Symptoms of Listed Potential Problems Will be Absent or Manageable (Perioperative Period)  Outcome: Outcome(s) achieved Date Met:  06/15/17

## 2017-06-15 NOTE — ANESTHESIA PREPROCEDURE EVALUATION
Anesthesia Evaluation     Patient summary reviewed and Nursing notes reviewed   no history of anesthetic complications:  NPO Solid Status: > 6 hours  NPO Liquid Status: > 6 hours     Airway   Mallampati: II  TM distance: >3 FB  Neck ROM: full  no difficulty expected  Dental - normal exam     Pulmonary - normal exam    breath sounds clear to auscultation  (+) asthma,   (-) rhonchi, decreased breath sounds, wheezes, rales, stridor  Cardiovascular - negative cardio ROS and normal exam    Rhythm: regular  Rate: normal    (-) murmur, weak pulses, friction rub, systolic click, carotid bruits, JVD, peripheral edema      Neuro/Psych  (+) psychiatric history Bipolar, Anxiety and Depression,    GI/Hepatic/Renal/Endo - negative ROS     Musculoskeletal (-) negative ROS    Abdominal  - normal exam    Abdomen: soft.   Substance History - negative use     OB/GYN negative ob/gyn ROS         Other - negative ROS                                       Anesthesia Plan    ASA 2     general     intravenous induction   Anesthetic plan and risks discussed with patient.

## 2017-06-15 NOTE — ANESTHESIA PROCEDURE NOTES
Airway  Urgency: elective    Airway not difficult    General Information and Staff    Patient location during procedure: OR  Anesthesiologist: ESTRELLA MARR  CRNA: NABILA MCKEON    Indications and Patient Condition  Indications for airway management: airway protection    Preoxygenated: yes  MILS not maintained throughout  Mask difficulty assessment: 1 - vent by mask    Final Airway Details  Final airway type: endotracheal airway      Successful airway: ETT  Cuffed: yes   Successful intubation technique: direct laryngoscopy  Facilitating devices/methods: intubating stylet  Endotracheal tube insertion site: oral  Blade: Bud  Blade size: #3  ETT size: 7.0 mm  Cormack-Lehane Classification: grade I - full view of glottis  Placement verified by: chest auscultation   Cuff volume (mL): 8  Measured from: lips  ETT to lips (cm): 21  Number of attempts at approach: 1    Additional Comments  PreO2 100% face mask, IV induction, easy mask, DVL x1, cords noted, tube through, cuff up, EBBSH, +etCO2, = chest movement, tube secured in place, atraumatic, teeth and lips intact as preop.

## 2017-06-15 NOTE — PERIOPERATIVE NURSING NOTE
16 fr Cheung catheter placed without difficulty under sterile technique. Raine well.  Cheung cath instructions sheet explained & demonstrated with patient & mother, verbalizes understanding.  Leg bag changing explained & demo done but patient states she has a large bladder & refuses the leg bag but will take it home in case she changes her mind. Cheung cath card taken out of kit & given to patient as well, along with extra stat lock & extra alcohol pads to clean tubing tip when emptying cheung bag.

## 2018-02-09 ENCOUNTER — CONVERSION ENCOUNTER (OUTPATIENT)
Dept: INTERNAL MEDICINE | Facility: CLINIC | Age: 33
End: 2018-02-09

## 2018-02-09 ENCOUNTER — OFFICE VISIT CONVERTED (OUTPATIENT)
Dept: INTERNAL MEDICINE | Facility: CLINIC | Age: 33
End: 2018-02-09
Attending: NURSE PRACTITIONER

## 2018-03-12 ENCOUNTER — OFFICE VISIT CONVERTED (OUTPATIENT)
Dept: INTERNAL MEDICINE | Facility: CLINIC | Age: 33
End: 2018-03-12
Attending: NURSE PRACTITIONER

## 2018-04-02 ENCOUNTER — OFFICE VISIT CONVERTED (OUTPATIENT)
Dept: INTERNAL MEDICINE | Facility: CLINIC | Age: 33
End: 2018-04-02
Attending: PHYSICIAN ASSISTANT

## 2018-04-10 ENCOUNTER — CONVERSION ENCOUNTER (OUTPATIENT)
Dept: INTERNAL MEDICINE | Facility: CLINIC | Age: 33
End: 2018-04-10

## 2018-04-10 ENCOUNTER — OFFICE VISIT CONVERTED (OUTPATIENT)
Dept: INTERNAL MEDICINE | Facility: CLINIC | Age: 33
End: 2018-04-10
Attending: PHYSICIAN ASSISTANT

## 2018-04-27 ENCOUNTER — OFFICE VISIT CONVERTED (OUTPATIENT)
Dept: INTERNAL MEDICINE | Facility: CLINIC | Age: 33
End: 2018-04-27
Attending: NURSE PRACTITIONER

## 2018-05-23 ENCOUNTER — OFFICE VISIT CONVERTED (OUTPATIENT)
Dept: INTERNAL MEDICINE | Facility: CLINIC | Age: 33
End: 2018-05-23
Attending: INTERNAL MEDICINE

## 2018-06-14 ENCOUNTER — OFFICE VISIT CONVERTED (OUTPATIENT)
Dept: INTERNAL MEDICINE | Facility: CLINIC | Age: 33
End: 2018-06-14
Attending: NURSE PRACTITIONER

## 2018-08-31 ENCOUNTER — OFFICE VISIT CONVERTED (OUTPATIENT)
Dept: INTERNAL MEDICINE | Facility: CLINIC | Age: 33
End: 2018-08-31
Attending: NURSE PRACTITIONER

## 2018-08-31 ENCOUNTER — CONVERSION ENCOUNTER (OUTPATIENT)
Dept: INTERNAL MEDICINE | Facility: CLINIC | Age: 33
End: 2018-08-31

## 2019-02-08 ENCOUNTER — OFFICE VISIT CONVERTED (OUTPATIENT)
Dept: INTERNAL MEDICINE | Facility: CLINIC | Age: 34
End: 2019-02-08
Attending: NURSE PRACTITIONER

## 2019-02-08 ENCOUNTER — HOSPITAL ENCOUNTER (OUTPATIENT)
Dept: OTHER | Facility: HOSPITAL | Age: 34
Discharge: HOME | End: 2019-02-08
Attending: NURSE PRACTITIONER

## 2019-02-08 LAB
25(OH)D3 SERPL-MCNC: 19.2 NG/ML (ref 30–100)
ALBUMIN SERPL-MCNC: 4.6 G/DL (ref 3.5–5)
ALBUMIN/GLOB SERPL: 1.8 {RATIO} (ref 1.4–2.6)
ALP SERPL-CCNC: 58 U/L (ref 42–98)
ALT SERPL-CCNC: 15 U/L (ref 10–40)
ANION GAP SERPL CALC-SCNC: 16 MMOL/L (ref 8–19)
AST SERPL-CCNC: 16 U/L (ref 15–50)
BASOPHILS # BLD AUTO: 0.01 10*3/UL (ref 0–0.2)
BASOPHILS NFR BLD AUTO: 0.08 % (ref 0–3)
BILIRUB SERPL-MCNC: 0.4 MG/DL (ref 0.2–1.3)
BUN SERPL-MCNC: 11 MG/DL (ref 5–25)
BUN/CREAT SERPL: 13 {RATIO} (ref 6–20)
CALCIUM SERPL-MCNC: 9.7 MG/DL (ref 8.7–10.4)
CHLORIDE SERPL-SCNC: 102 MMOL/L (ref 99–111)
CONV CO2: 29 MMOL/L (ref 22–32)
CONV TOTAL PROTEIN: 7.1 G/DL (ref 6.3–8.2)
CREAT UR-MCNC: 0.86 MG/DL (ref 0.5–0.9)
EOSINOPHIL # BLD AUTO: 0.16 10*3/UL (ref 0–0.7)
EOSINOPHIL # BLD AUTO: 1.95 % (ref 0–7)
ERYTHROCYTE [DISTWIDTH] IN BLOOD BY AUTOMATED COUNT: 11.9 % (ref 11.5–14.5)
FOLATE SERPL-MCNC: 18.5 NG/ML (ref 4.8–20)
GFR SERPLBLD BASED ON 1.73 SQ M-ARVRAT: >60 ML/MIN/{1.73_M2}
GLOBULIN UR ELPH-MCNC: 2.5 G/DL (ref 2–3.5)
GLUCOSE SERPL-MCNC: 61 MG/DL (ref 65–99)
HBA1C MFR BLD: 13.1 G/DL (ref 12–16)
HCT VFR BLD AUTO: 38.3 % (ref 37–47)
IRON SATN MFR SERPL: 20 % (ref 20–55)
IRON SERPL-MCNC: 78 UG/DL (ref 60–170)
LYMPHOCYTES # BLD AUTO: 1.36 10*3/UL (ref 1–5)
MCH RBC QN AUTO: 29.4 PG (ref 27–31)
MCHC RBC AUTO-ENTMCNC: 34.3 G/DL (ref 33–37)
MCV RBC AUTO: 85.6 FL (ref 81–99)
MONOCYTES # BLD AUTO: 0.45 10*3/UL (ref 0.2–1.2)
MONOCYTES NFR BLD AUTO: 5.41 % (ref 3–10)
NEUTROPHILS # BLD AUTO: 6.36 10*3/UL (ref 2–8)
NEUTROPHILS NFR BLD AUTO: 76.3 % (ref 30–85)
NRBC BLD AUTO-RTO: 0 % (ref 0–0.01)
OSMOLALITY SERPL CALC.SUM OF ELEC: 291 MOSM/KG (ref 273–304)
PLATELET # BLD AUTO: 268 10*3/UL (ref 130–400)
PMV BLD AUTO: 8.2 FL (ref 7.4–10.4)
POTASSIUM SERPL-SCNC: 4.6 MMOL/L (ref 3.5–5.3)
RBC # BLD AUTO: 4.47 10*6/UL (ref 4.2–5.4)
SODIUM SERPL-SCNC: 142 MMOL/L (ref 135–147)
T4 FREE SERPL-MCNC: 1.2 NG/DL (ref 0.9–1.8)
TIBC SERPL-MCNC: 396 UG/DL (ref 245–450)
TRANSFERRIN SERPL-MCNC: 277 MG/DL (ref 250–380)
TSH SERPL-ACNC: 1.21 M[IU]/L (ref 0.27–4.2)
VARIANT LYMPHS NFR BLD MANUAL: 16.3 % (ref 20–45)
VIT B12 SERPL-MCNC: 780 PG/ML (ref 211–911)
WBC # BLD AUTO: 8.34 10*3/UL (ref 4.8–10.8)

## 2019-05-13 ENCOUNTER — OFFICE VISIT CONVERTED (OUTPATIENT)
Dept: INTERNAL MEDICINE | Facility: CLINIC | Age: 34
End: 2019-05-13
Attending: NURSE PRACTITIONER

## 2019-05-13 ENCOUNTER — HOSPITAL ENCOUNTER (OUTPATIENT)
Dept: OTHER | Facility: HOSPITAL | Age: 34
Discharge: HOME | End: 2019-05-13
Attending: NURSE PRACTITIONER

## 2019-05-13 LAB
25(OH)D3 SERPL-MCNC: 29.2 NG/ML (ref 30–100)
ANION GAP SERPL CALC-SCNC: 17 MMOL/L (ref 8–19)
BASOPHILS # BLD AUTO: 0.03 10*3/UL (ref 0–0.2)
BASOPHILS NFR BLD AUTO: 0.5 % (ref 0–3)
BUN SERPL-MCNC: 9 MG/DL (ref 5–25)
BUN/CREAT SERPL: 10 {RATIO} (ref 6–20)
CALCIUM SERPL-MCNC: 8.9 MG/DL (ref 8.7–10.4)
CHLORIDE SERPL-SCNC: 102 MMOL/L (ref 99–111)
CONV ABS IMM GRAN: 0.04 10*3/UL (ref 0–0.2)
CONV CO2: 27 MMOL/L (ref 22–32)
CONV IMMATURE GRAN: 0.7 % (ref 0–1.8)
CREAT UR-MCNC: 0.86 MG/DL (ref 0.5–0.9)
DEPRECATED RDW RBC AUTO: 40.9 FL (ref 36.4–46.3)
EOSINOPHIL # BLD AUTO: 0.13 10*3/UL (ref 0–0.7)
EOSINOPHIL # BLD AUTO: 2.2 % (ref 0–7)
ERYTHROCYTE [DISTWIDTH] IN BLOOD BY AUTOMATED COUNT: 12.4 % (ref 11.7–14.4)
GFR SERPLBLD BASED ON 1.73 SQ M-ARVRAT: >60 ML/MIN/{1.73_M2}
GLUCOSE SERPL-MCNC: 77 MG/DL (ref 65–99)
HBA1C MFR BLD: 12.8 G/DL (ref 12–16)
HCT VFR BLD AUTO: 39.7 % (ref 37–47)
LYMPHOCYTES # BLD AUTO: 1.43 10*3/UL (ref 1–5)
MCH RBC QN AUTO: 29 PG (ref 27–31)
MCHC RBC AUTO-ENTMCNC: 32.2 G/DL (ref 33–37)
MCV RBC AUTO: 89.8 FL (ref 81–99)
MONOCYTES # BLD AUTO: 0.34 10*3/UL (ref 0.2–1.2)
MONOCYTES NFR BLD AUTO: 5.8 % (ref 3–10)
NEUTROPHILS # BLD AUTO: 3.86 10*3/UL (ref 2–8)
NEUTROPHILS NFR BLD AUTO: 66.3 % (ref 30–85)
NRBC CBCN: 0 % (ref 0–0.7)
OSMOLALITY SERPL CALC.SUM OF ELEC: 291 MOSM/KG (ref 273–304)
PLATELET # BLD AUTO: 240 10*3/UL (ref 130–400)
PMV BLD AUTO: 10.9 FL (ref 9.4–12.3)
POTASSIUM SERPL-SCNC: 3.6 MMOL/L (ref 3.5–5.3)
RBC # BLD AUTO: 4.42 10*6/UL (ref 4.2–5.4)
SODIUM SERPL-SCNC: 142 MMOL/L (ref 135–147)
T4 FREE SERPL-MCNC: 1.5 NG/DL (ref 0.9–1.8)
TSH SERPL-ACNC: 1.29 M[IU]/L (ref 0.27–4.2)
VARIANT LYMPHS NFR BLD MANUAL: 24.5 % (ref 20–45)
WBC # BLD AUTO: 5.83 10*3/UL (ref 4.8–10.8)

## 2019-05-31 ENCOUNTER — HOSPITAL ENCOUNTER (OUTPATIENT)
Dept: OTHER | Facility: HOSPITAL | Age: 34
Discharge: HOME | End: 2019-05-31
Attending: NURSE PRACTITIONER

## 2019-07-01 ENCOUNTER — HOSPITAL ENCOUNTER (OUTPATIENT)
Dept: OTHER | Facility: HOSPITAL | Age: 34
Discharge: HOME | End: 2019-07-01

## 2019-07-01 LAB
25(OH)D3 SERPL-MCNC: 26.5 NG/ML (ref 30–100)
ALBUMIN SERPL-MCNC: 4.4 G/DL (ref 3.5–5)
ALBUMIN/GLOB SERPL: 1.7 {RATIO} (ref 1.4–2.6)
ALP SERPL-CCNC: 64 U/L (ref 42–98)
ALT SERPL-CCNC: 16 U/L (ref 10–40)
ANION GAP SERPL CALC-SCNC: 16 MMOL/L (ref 8–19)
AST SERPL-CCNC: 18 U/L (ref 15–50)
BASOPHILS # BLD AUTO: 0.03 10*3/UL (ref 0–0.2)
BASOPHILS NFR BLD AUTO: 0.5 % (ref 0–3)
BILIRUB SERPL-MCNC: 0.45 MG/DL (ref 0.2–1.3)
BUN SERPL-MCNC: 7 MG/DL (ref 5–25)
BUN/CREAT SERPL: 8 {RATIO} (ref 6–20)
CALCIUM SERPL-MCNC: 8.8 MG/DL (ref 8.7–10.4)
CHLORIDE SERPL-SCNC: 105 MMOL/L (ref 99–111)
CONV ABS IMM GRAN: 0.03 10*3/UL (ref 0–0.2)
CONV CO2: 27 MMOL/L (ref 22–32)
CONV IMMATURE GRAN: 0.5 % (ref 0–1.8)
CONV TOTAL PROTEIN: 7 G/DL (ref 6.3–8.2)
CREAT UR-MCNC: 0.93 MG/DL (ref 0.5–0.9)
DEPRECATED RDW RBC AUTO: 40.7 FL (ref 36.4–46.3)
EOSINOPHIL # BLD AUTO: 0.12 10*3/UL (ref 0–0.7)
EOSINOPHIL # BLD AUTO: 1.9 % (ref 0–7)
ERYTHROCYTE [DISTWIDTH] IN BLOOD BY AUTOMATED COUNT: 12.5 % (ref 11.7–14.4)
GFR SERPLBLD BASED ON 1.73 SQ M-ARVRAT: >60 ML/MIN/{1.73_M2}
GLOBULIN UR ELPH-MCNC: 2.6 G/DL (ref 2–3.5)
GLUCOSE SERPL-MCNC: 76 MG/DL (ref 65–99)
HBA1C MFR BLD: 13.4 G/DL (ref 12–16)
HCT VFR BLD AUTO: 40.6 % (ref 37–47)
LYMPHOCYTES # BLD AUTO: 1.57 10*3/UL (ref 1–5)
MCH RBC QN AUTO: 29.6 PG (ref 27–31)
MCHC RBC AUTO-ENTMCNC: 33 G/DL (ref 33–37)
MCV RBC AUTO: 89.6 FL (ref 81–99)
MONOCYTES # BLD AUTO: 0.32 10*3/UL (ref 0.2–1.2)
MONOCYTES NFR BLD AUTO: 5 % (ref 3–10)
NEUTROPHILS # BLD AUTO: 4.27 10*3/UL (ref 2–8)
NEUTROPHILS NFR BLD AUTO: 67.3 % (ref 30–85)
NRBC CBCN: 0 % (ref 0–0.7)
OSMOLALITY SERPL CALC.SUM OF ELEC: 293 MOSM/KG (ref 273–304)
PLATELET # BLD AUTO: 259 10*3/UL (ref 130–400)
PMV BLD AUTO: 10.2 FL (ref 9.4–12.3)
POTASSIUM SERPL-SCNC: 4.7 MMOL/L (ref 3.5–5.3)
RBC # BLD AUTO: 4.53 10*6/UL (ref 4.2–5.4)
SODIUM SERPL-SCNC: 143 MMOL/L (ref 135–147)
TSH SERPL-ACNC: 1.26 M[IU]/L (ref 0.27–4.2)
VARIANT LYMPHS NFR BLD MANUAL: 24.8 % (ref 20–45)
WBC # BLD AUTO: 6.34 10*3/UL (ref 4.8–10.8)

## 2019-07-02 LAB — CONV THYROXINE TOTAL: 6.4 UG/DL (ref 4.5–12)

## 2019-07-05 LAB — T3REVERSE SERPL-MCNC: 19.6 NG/DL (ref 9.2–24.1)

## 2019-07-31 ENCOUNTER — HOSPITAL ENCOUNTER (OUTPATIENT)
Dept: GENERAL RADIOLOGY | Facility: HOSPITAL | Age: 34
Discharge: HOME | End: 2019-07-31

## 2019-08-09 ENCOUNTER — HOSPITAL ENCOUNTER (OUTPATIENT)
Dept: OTHER | Facility: HOSPITAL | Age: 34
Discharge: HOME | End: 2019-08-09
Attending: NURSE PRACTITIONER

## 2019-08-09 ENCOUNTER — OFFICE VISIT CONVERTED (OUTPATIENT)
Dept: INTERNAL MEDICINE | Facility: CLINIC | Age: 34
End: 2019-08-09
Attending: NURSE PRACTITIONER

## 2019-08-19 ENCOUNTER — OFFICE VISIT CONVERTED (OUTPATIENT)
Dept: INTERNAL MEDICINE | Facility: CLINIC | Age: 34
End: 2019-08-19
Attending: NURSE PRACTITIONER

## 2019-08-22 ENCOUNTER — HOSPITAL ENCOUNTER (OUTPATIENT)
Dept: OTHER | Facility: HOSPITAL | Age: 34
Discharge: HOME | End: 2019-08-22
Attending: NURSE PRACTITIONER

## 2019-08-24 LAB — R RICKETTSI IGM TITR SER: 0.32 INDEX (ref 0–0.89)

## 2019-08-26 LAB
B MICROTI DNA BLD QL NAA+PROBE: NOT DETECTED
CONV ANAPLASMA PHAGOCYTOPHILUM PCR: NOT DETECTED
CONV BABESIA SPECIES PCR: NOT DETECTED
CONV EHRLICHIA EWINGII CANIS PCR: NOT DETECTED
CONV EHRLICHIA MURIS LIKE PCR: NOT DETECTED
E CHAFFEENSIS DNA BLD QL NAA+PROBE: NOT DETECTED

## 2019-08-27 LAB — R RICKETTSI IGG SER QL IA: NEGATIVE

## 2019-08-29 ENCOUNTER — HOSPITAL ENCOUNTER (OUTPATIENT)
Dept: PERIOP | Facility: HOSPITAL | Age: 34
Setting detail: HOSPITAL OUTPATIENT SURGERY
Discharge: HOME | End: 2019-08-30
Attending: OBSTETRICS & GYNECOLOGY

## 2019-08-29 LAB
ABO GROUP BLD: NORMAL
BASOPHILS # BLD AUTO: 0.02 10*3/UL (ref 0–0.2)
BASOPHILS NFR BLD AUTO: 0.4 % (ref 0–3)
BLD GP AB SCN SERPL QL: NORMAL
CONV ABD CONTROL: NORMAL
CONV ABS IMM GRAN: 0.03 10*3/UL (ref 0–0.2)
CONV IMMATURE GRAN: 0.6 % (ref 0–1.8)
DEPRECATED RDW RBC AUTO: 38.8 FL (ref 36.4–46.3)
EOSINOPHIL # BLD AUTO: 0.08 10*3/UL (ref 0–0.7)
EOSINOPHIL # BLD AUTO: 1.5 % (ref 0–7)
ERYTHROCYTE [DISTWIDTH] IN BLOOD BY AUTOMATED COUNT: 12 % (ref 11.7–14.4)
HCG INTACT+B SERPL-ACNC: <0.5 M[IU]/ML (ref 0–5)
HCT VFR BLD AUTO: 38.5 % (ref 37–47)
HCT VFR BLD AUTO: 41.2 % (ref 37–47)
HGB BLD-MCNC: 12.6 G/DL (ref 12–16)
HGB BLD-MCNC: 13.5 G/DL (ref 12–16)
LYMPHOCYTES # BLD AUTO: 1.55 10*3/UL (ref 1–5)
LYMPHOCYTES NFR BLD AUTO: 29.6 % (ref 20–45)
MCH RBC QN AUTO: 28.7 PG (ref 27–31)
MCHC RBC AUTO-ENTMCNC: 32.8 G/DL (ref 33–37)
MCV RBC AUTO: 87.5 FL (ref 81–99)
MONOCYTES # BLD AUTO: 0.34 10*3/UL (ref 0.2–1.2)
MONOCYTES NFR BLD AUTO: 6.5 % (ref 3–10)
NEUTROPHILS # BLD AUTO: 3.22 10*3/UL (ref 2–8)
NEUTROPHILS NFR BLD AUTO: 61.4 % (ref 30–85)
NRBC CBCN: 0 % (ref 0–0.7)
PLATELET # BLD AUTO: 239 10*3/UL (ref 130–400)
PMV BLD AUTO: 9.8 FL (ref 9.4–12.3)
RBC # BLD AUTO: 4.71 10*6/UL (ref 4.2–5.4)
RH BLD: NORMAL
WBC # BLD AUTO: 5.24 10*3/UL (ref 4.8–10.8)

## 2019-08-30 LAB
BASOPHILS # BLD AUTO: 0.03 10*3/UL (ref 0–0.2)
BASOPHILS NFR BLD AUTO: 0.3 % (ref 0–3)
CONV ABS IMM GRAN: 0.04 10*3/UL (ref 0–0.2)
CONV IMMATURE GRAN: 0.4 % (ref 0–1.8)
DEPRECATED RDW RBC AUTO: 38.8 FL (ref 36.4–46.3)
EOSINOPHIL # BLD AUTO: 0.06 10*3/UL (ref 0–0.7)
EOSINOPHIL # BLD AUTO: 0.6 % (ref 0–7)
ERYTHROCYTE [DISTWIDTH] IN BLOOD BY AUTOMATED COUNT: 12.1 % (ref 11.7–14.4)
HCT VFR BLD AUTO: 34.2 % (ref 37–47)
HGB BLD-MCNC: 11.3 G/DL (ref 12–16)
LYMPHOCYTES # BLD AUTO: 1.63 10*3/UL (ref 1–5)
LYMPHOCYTES NFR BLD AUTO: 15.8 % (ref 20–45)
MCH RBC QN AUTO: 28.9 PG (ref 27–31)
MCHC RBC AUTO-ENTMCNC: 33 G/DL (ref 33–37)
MCV RBC AUTO: 87.5 FL (ref 81–99)
MONOCYTES # BLD AUTO: 0.58 10*3/UL (ref 0.2–1.2)
MONOCYTES NFR BLD AUTO: 5.6 % (ref 3–10)
NEUTROPHILS # BLD AUTO: 7.95 10*3/UL (ref 2–8)
NEUTROPHILS NFR BLD AUTO: 77.3 % (ref 30–85)
NRBC CBCN: 0 % (ref 0–0.7)
PLATELET # BLD AUTO: 198 10*3/UL (ref 130–400)
PMV BLD AUTO: 10.2 FL (ref 9.4–12.3)
RBC # BLD AUTO: 3.91 10*6/UL (ref 4.2–5.4)
WBC # BLD AUTO: 10.29 10*3/UL (ref 4.8–10.8)

## 2019-11-18 ENCOUNTER — OFFICE VISIT CONVERTED (OUTPATIENT)
Dept: INTERNAL MEDICINE | Facility: CLINIC | Age: 34
End: 2019-11-18
Attending: NURSE PRACTITIONER

## 2019-11-18 ENCOUNTER — HOSPITAL ENCOUNTER (OUTPATIENT)
Dept: OTHER | Facility: HOSPITAL | Age: 34
Discharge: HOME | End: 2019-11-18
Attending: NURSE PRACTITIONER

## 2019-11-18 LAB
25(OH)D3 SERPL-MCNC: 43.8 NG/ML (ref 30–100)
ALBUMIN SERPL-MCNC: 5 G/DL (ref 3.5–5)
ALBUMIN/GLOB SERPL: 1.8 {RATIO} (ref 1.4–2.6)
ALP SERPL-CCNC: 68 U/L (ref 42–98)
ALT SERPL-CCNC: 20 U/L (ref 10–40)
ANION GAP SERPL CALC-SCNC: 19 MMOL/L (ref 8–19)
AST SERPL-CCNC: 24 U/L (ref 15–50)
BASOPHILS # BLD AUTO: 0.03 10*3/UL (ref 0–0.2)
BASOPHILS NFR BLD AUTO: 0.6 % (ref 0–3)
BILIRUB SERPL-MCNC: 0.53 MG/DL (ref 0.2–1.3)
BUN SERPL-MCNC: 8 MG/DL (ref 5–25)
BUN/CREAT SERPL: 9 {RATIO} (ref 6–20)
CALCIUM SERPL-MCNC: 9.6 MG/DL (ref 8.7–10.4)
CHLORIDE SERPL-SCNC: 99 MMOL/L (ref 99–111)
CONV ABS IMM GRAN: 0.02 10*3/UL (ref 0–0.2)
CONV CO2: 23 MMOL/L (ref 22–32)
CONV IMMATURE GRAN: 0.4 % (ref 0–1.8)
CONV TOTAL PROTEIN: 7.8 G/DL (ref 6.3–8.2)
CREAT UR-MCNC: 0.91 MG/DL (ref 0.5–0.9)
DEPRECATED RDW RBC AUTO: 38.1 FL (ref 36.4–46.3)
EOSINOPHIL # BLD AUTO: 0.11 10*3/UL (ref 0–0.7)
EOSINOPHIL # BLD AUTO: 2.1 % (ref 0–7)
ERYTHROCYTE [DISTWIDTH] IN BLOOD BY AUTOMATED COUNT: 12.3 % (ref 11.7–14.4)
GFR SERPLBLD BASED ON 1.73 SQ M-ARVRAT: >60 ML/MIN/{1.73_M2}
GLOBULIN UR ELPH-MCNC: 2.8 G/DL (ref 2–3.5)
GLUCOSE SERPL-MCNC: 84 MG/DL (ref 65–99)
HCT VFR BLD AUTO: 43.8 % (ref 37–47)
HGB BLD-MCNC: 14.7 G/DL (ref 12–16)
IRON SATN MFR SERPL: 30 % (ref 20–55)
IRON SERPL-MCNC: 107 UG/DL (ref 60–170)
LYMPHOCYTES # BLD AUTO: 1.53 10*3/UL (ref 1–5)
LYMPHOCYTES NFR BLD AUTO: 29 % (ref 20–45)
MCH RBC QN AUTO: 28.5 PG (ref 27–31)
MCHC RBC AUTO-ENTMCNC: 33.6 G/DL (ref 33–37)
MCV RBC AUTO: 85 FL (ref 81–99)
MONOCYTES # BLD AUTO: 0.3 10*3/UL (ref 0.2–1.2)
MONOCYTES NFR BLD AUTO: 5.7 % (ref 3–10)
NEUTROPHILS # BLD AUTO: 3.29 10*3/UL (ref 2–8)
NEUTROPHILS NFR BLD AUTO: 62.2 % (ref 30–85)
NRBC CBCN: 0 % (ref 0–0.7)
OSMOLALITY SERPL CALC.SUM OF ELEC: 280 MOSM/KG (ref 273–304)
PLATELET # BLD AUTO: 261 10*3/UL (ref 130–400)
PMV BLD AUTO: 10.6 FL (ref 9.4–12.3)
POTASSIUM SERPL-SCNC: 4.9 MMOL/L (ref 3.5–5.3)
RBC # BLD AUTO: 5.15 10*6/UL (ref 4.2–5.4)
SODIUM SERPL-SCNC: 136 MMOL/L (ref 135–147)
TIBC SERPL-MCNC: 353 UG/DL (ref 245–450)
TRANSFERRIN SERPL-MCNC: 247 MG/DL (ref 250–380)
TSH SERPL-ACNC: 1.34 M[IU]/L (ref 0.27–4.2)
VIT B12 SERPL-MCNC: 846 PG/ML (ref 211–911)
WBC # BLD AUTO: 5.28 10*3/UL (ref 4.8–10.8)

## 2019-12-04 ENCOUNTER — HOSPITAL ENCOUNTER (OUTPATIENT)
Dept: OTHER | Facility: HOSPITAL | Age: 34
Discharge: HOME | End: 2019-12-04
Attending: PHYSICIAN ASSISTANT

## 2019-12-04 ENCOUNTER — OFFICE VISIT CONVERTED (OUTPATIENT)
Dept: INTERNAL MEDICINE | Facility: CLINIC | Age: 34
End: 2019-12-04
Attending: PHYSICIAN ASSISTANT

## 2019-12-04 LAB
APPEARANCE UR: CLEAR
BILIRUB UR QL: NEGATIVE
COLOR UR: YELLOW
CONV BACTERIA: NEGATIVE
CONV COLLECTION SOURCE (UA): ABNORMAL
CONV UROBILINOGEN IN URINE BY AUTOMATED TEST STRIP: 0.2 {EHRLICHU}/DL (ref 0.1–1)
GLUCOSE UR QL: NEGATIVE MG/DL
HGB UR QL STRIP: NEGATIVE
KETONES UR QL STRIP: NEGATIVE MG/DL
LEUKOCYTE ESTERASE UR QL STRIP: ABNORMAL
NITRITE UR QL STRIP: NEGATIVE
PH UR STRIP.AUTO: 8 [PH] (ref 5–8)
PROT UR QL: NEGATIVE MG/DL
RBC #/AREA URNS HPF: ABNORMAL /[HPF]
SP GR UR: 1.01 (ref 1–1.03)
WBC #/AREA URNS HPF: ABNORMAL /[HPF]

## 2019-12-06 LAB — BACTERIA UR CULT: NORMAL

## 2019-12-17 ENCOUNTER — CONVERSION ENCOUNTER (OUTPATIENT)
Dept: OTOLARYNGOLOGY | Facility: CLINIC | Age: 34
End: 2019-12-17

## 2019-12-17 ENCOUNTER — OFFICE VISIT CONVERTED (OUTPATIENT)
Dept: OTOLARYNGOLOGY | Facility: CLINIC | Age: 34
End: 2019-12-17
Attending: OTOLARYNGOLOGY

## 2020-02-06 ENCOUNTER — HOSPITAL ENCOUNTER (OUTPATIENT)
Dept: URGENT CARE | Facility: CLINIC | Age: 35
Discharge: HOME | End: 2020-02-06
Attending: EMERGENCY MEDICINE

## 2020-02-07 ENCOUNTER — OFFICE VISIT CONVERTED (OUTPATIENT)
Dept: INTERNAL MEDICINE | Facility: CLINIC | Age: 35
End: 2020-02-07
Attending: NURSE PRACTITIONER

## 2020-02-14 ENCOUNTER — CONVERSION ENCOUNTER (OUTPATIENT)
Dept: INTERNAL MEDICINE | Facility: CLINIC | Age: 35
End: 2020-02-14

## 2020-02-14 ENCOUNTER — OFFICE VISIT CONVERTED (OUTPATIENT)
Dept: INTERNAL MEDICINE | Facility: CLINIC | Age: 35
End: 2020-02-14
Attending: PHYSICIAN ASSISTANT

## 2020-04-10 ENCOUNTER — TELEMEDICINE CONVERTED (OUTPATIENT)
Dept: INTERNAL MEDICINE | Facility: CLINIC | Age: 35
End: 2020-04-10
Attending: NURSE PRACTITIONER

## 2020-07-01 ENCOUNTER — TELEMEDICINE CONVERTED (OUTPATIENT)
Dept: INTERNAL MEDICINE | Facility: CLINIC | Age: 35
End: 2020-07-01
Attending: PHYSICIAN ASSISTANT

## 2020-07-06 ENCOUNTER — HOSPITAL ENCOUNTER (OUTPATIENT)
Dept: OTHER | Facility: HOSPITAL | Age: 35
Discharge: HOME | End: 2020-07-06
Attending: PHYSICIAN ASSISTANT

## 2020-07-07 LAB
C TRACH RRNA CVX QL NAA+PROBE: NOT DETECTED
N GONORRHOEA DNA SPEC QL NAA+PROBE: NOT DETECTED

## 2020-07-08 NOTE — BRIEF OP NOTE
Prior to immunization administration, verified patients identity using patient s name and date of birth. Please see Immunization Activity for additional information.     Screening Questionnaire for Adult Immunization    Are you sick today?   No   Do you have allergies to medications, food, a vaccine component or latex?   Yes   Have you ever had a serious reaction after receiving a vaccination?   Yes   Do you have a long-term health problem with heart, lung, kidney, or metabolic disease (e.g., diabetes), asthma, a blood disorder, no spleen, complement component deficiency, a cochlear implant, or a spinal fluid leak?  Are you on long-term aspirin therapy?   No   Do you have cancer, leukemia, HIV/AIDS, or any other immune system problem?   Yes   Do you have a parent, brother, or sister with an immune system problem?   No   In the past 3 months, have you taken medications that affect  your immune system, such as prednisone, other steroids, or anticancer drugs; drugs for the treatment of rheumatoid arthritis, Crohn s disease, or psoriasis; or have you had radiation treatments?   No   Have you had a seizure, or a brain or other nervous system problem?   No   During the past year, have you received a transfusion of blood or blood    products, or been given immune (gamma) globulin or antiviral drug?   No   For women: Are you pregnant or is there a chance you could become       pregnant during the next month?   No   Have you received any vaccinations in the past 4 weeks?   No     Immunization questionnaire was positive for at least one answer.  Notified Dr Olvera.        Per orders of Dr. Garcia, injection of Shingrix given by Caitlin Perdue MA. Patient instructed to remain in clinic for 15 minutes afterwards, and to report any adverse reaction to me immediately.       Screening performed by Caitlin Perdue MA on 7/8/2020 at 4:46 PM.     transSVAGINAL TAPING SUSPENSION, DILATATION AND CURETTAGE HYSTEROSCOPY NOVASURE ENDOMETRIAL ABLATION  Procedure Note    Audra MCLEOD  6/15/2017    Pre-op Diagnosis:   * No pre-op diagnosis entered *Menorrhagia  ISD    Post-op Diagnosis:     LUIZ    Procedure/CPT® Codes:      Procedure(s):  RETROPUBIC SLING W/CYSTOSCOPY   DILATATION AND CURETTAGE HYSTEROSCOPY NOVASURE ENDOMETRIAL ABLATION    Surgeon(s):  Hebert Wallace MD    Anesthesia: General    Staff:   Circulator: Shanthi Vieira RN  Scrub Person: Wily Zapata    Estimated Blood Loss: *No blood loss documented*  Urine Voided: * No values recorded between 6/15/2017  7:43 AM and 6/15/2017  8:56 AM *    Specimens:                * No specimens in log *      Drains:    none       Findings: Normal apearing endometrium  Complications: nonne      Hebert Wallace MD     Date: 6/15/2017  Time: 9:06 AM

## 2020-07-21 ENCOUNTER — OFFICE VISIT CONVERTED (OUTPATIENT)
Dept: INTERNAL MEDICINE | Facility: CLINIC | Age: 35
End: 2020-07-21
Attending: NURSE PRACTITIONER

## 2020-07-21 ENCOUNTER — HOSPITAL ENCOUNTER (OUTPATIENT)
Dept: OTHER | Facility: HOSPITAL | Age: 35
Discharge: HOME | End: 2020-07-21
Attending: NURSE PRACTITIONER

## 2020-07-21 LAB
25(OH)D3 SERPL-MCNC: 39.4 NG/ML (ref 30–100)
ALBUMIN SERPL-MCNC: 4.7 G/DL (ref 3.5–5)
ALBUMIN/GLOB SERPL: 1.9 {RATIO} (ref 1.4–2.6)
ALP SERPL-CCNC: 52 U/L (ref 42–98)
ALT SERPL-CCNC: 20 U/L (ref 10–40)
ANION GAP SERPL CALC-SCNC: 20 MMOL/L (ref 8–19)
AST SERPL-CCNC: 22 U/L (ref 15–50)
BASOPHILS # BLD AUTO: 0.03 10*3/UL (ref 0–0.2)
BASOPHILS NFR BLD AUTO: 0.5 % (ref 0–3)
BILIRUB SERPL-MCNC: 0.33 MG/DL (ref 0.2–1.3)
BUN SERPL-MCNC: 11 MG/DL (ref 5–25)
BUN/CREAT SERPL: 12 {RATIO} (ref 6–20)
CALCIUM SERPL-MCNC: 9.1 MG/DL (ref 8.7–10.4)
CHLORIDE SERPL-SCNC: 100 MMOL/L (ref 99–111)
CHOLEST SERPL-MCNC: 157 MG/DL (ref 107–200)
CHOLEST/HDLC SERPL: 2.2 {RATIO} (ref 3–6)
CONV ABS IMM GRAN: 0.02 10*3/UL (ref 0–0.2)
CONV CO2: 22 MMOL/L (ref 22–32)
CONV IMMATURE GRAN: 0.4 % (ref 0–1.8)
CONV TOTAL PROTEIN: 7.2 G/DL (ref 6.3–8.2)
CREAT UR-MCNC: 0.93 MG/DL (ref 0.5–0.9)
DEPRECATED RDW RBC AUTO: 40.6 FL (ref 36.4–46.3)
EOSINOPHIL # BLD AUTO: 0.11 10*3/UL (ref 0–0.7)
EOSINOPHIL # BLD AUTO: 2 % (ref 0–7)
ERYTHROCYTE [DISTWIDTH] IN BLOOD BY AUTOMATED COUNT: 12.5 % (ref 11.7–14.4)
GFR SERPLBLD BASED ON 1.73 SQ M-ARVRAT: >60 ML/MIN/{1.73_M2}
GLOBULIN UR ELPH-MCNC: 2.5 G/DL (ref 2–3.5)
GLUCOSE SERPL-MCNC: 76 MG/DL (ref 65–99)
HCT VFR BLD AUTO: 43.6 % (ref 37–47)
HDLC SERPL-MCNC: 71 MG/DL (ref 40–60)
HGB BLD-MCNC: 14 G/DL (ref 12–16)
IRON SATN MFR SERPL: 23 % (ref 20–55)
IRON SERPL-MCNC: 79 UG/DL (ref 60–170)
LDLC SERPL CALC-MCNC: 69 MG/DL (ref 70–100)
LYMPHOCYTES # BLD AUTO: 1.35 10*3/UL (ref 1–5)
LYMPHOCYTES NFR BLD AUTO: 24.5 % (ref 20–45)
MCH RBC QN AUTO: 28.6 PG (ref 27–31)
MCHC RBC AUTO-ENTMCNC: 32.1 G/DL (ref 33–37)
MCV RBC AUTO: 89 FL (ref 81–99)
MONOCYTES # BLD AUTO: 0.28 10*3/UL (ref 0.2–1.2)
MONOCYTES NFR BLD AUTO: 5.1 % (ref 3–10)
NEUTROPHILS # BLD AUTO: 3.71 10*3/UL (ref 2–8)
NEUTROPHILS NFR BLD AUTO: 67.5 % (ref 30–85)
NRBC CBCN: 0 % (ref 0–0.7)
OSMOLALITY SERPL CALC.SUM OF ELEC: 284 MOSM/KG (ref 273–304)
PLATELET # BLD AUTO: 270 10*3/UL (ref 130–400)
PMV BLD AUTO: 11.2 FL (ref 9.4–12.3)
POTASSIUM SERPL-SCNC: 3.8 MMOL/L (ref 3.5–5.3)
RBC # BLD AUTO: 4.9 10*6/UL (ref 4.2–5.4)
SODIUM SERPL-SCNC: 138 MMOL/L (ref 135–147)
TIBC SERPL-MCNC: 350 UG/DL (ref 245–450)
TRANSFERRIN SERPL-MCNC: 245 MG/DL (ref 250–380)
TRIGL SERPL-MCNC: 84 MG/DL (ref 40–150)
TSH SERPL-ACNC: 1.47 M[IU]/L (ref 0.27–4.2)
VLDLC SERPL-MCNC: 17 MG/DL (ref 5–37)
WBC # BLD AUTO: 5.5 10*3/UL (ref 4.8–10.8)

## 2020-08-10 ENCOUNTER — HOSPITAL ENCOUNTER (OUTPATIENT)
Dept: OTHER | Facility: HOSPITAL | Age: 35
Discharge: HOME | End: 2020-08-10
Attending: NURSE PRACTITIONER

## 2020-08-10 ENCOUNTER — CONVERSION ENCOUNTER (OUTPATIENT)
Dept: INTERNAL MEDICINE | Facility: CLINIC | Age: 35
End: 2020-08-10

## 2020-08-10 ENCOUNTER — OFFICE VISIT CONVERTED (OUTPATIENT)
Dept: INTERNAL MEDICINE | Facility: CLINIC | Age: 35
End: 2020-08-10
Attending: NURSE PRACTITIONER

## 2020-08-11 LAB
CONV MUMPS ANTIBODY IGG: 23.8 AU/ML
HAV AB SER QL IA: NEGATIVE
HBV SURFACE AB SER QL: NON REACTIVE
MEV IGG SER IA-ACNC: 100 AU/ML
RUBV IGG SER-ACNC: 500 [IU]/ML
VZV IGG SER IA-ACNC: 412 INDEX

## 2020-08-20 ENCOUNTER — HOSPITAL ENCOUNTER (OUTPATIENT)
Dept: OTHER | Facility: HOSPITAL | Age: 35
Discharge: HOME | End: 2020-08-20
Attending: NURSE PRACTITIONER

## 2020-09-11 ENCOUNTER — OFFICE VISIT CONVERTED (OUTPATIENT)
Dept: INTERNAL MEDICINE | Facility: CLINIC | Age: 35
End: 2020-09-11
Attending: PHYSICIAN ASSISTANT

## 2020-09-11 ENCOUNTER — HOSPITAL ENCOUNTER (OUTPATIENT)
Dept: OTHER | Facility: HOSPITAL | Age: 35
Discharge: HOME | End: 2020-09-11
Attending: PHYSICIAN ASSISTANT

## 2020-09-11 LAB
ALBUMIN SERPL-MCNC: 5 G/DL (ref 3.5–5)
ALBUMIN/GLOB SERPL: 2 {RATIO} (ref 1.4–2.6)
ALP SERPL-CCNC: 56 U/L (ref 42–98)
ALT SERPL-CCNC: 17 U/L (ref 10–40)
ANION GAP SERPL CALC-SCNC: 16 MMOL/L (ref 8–19)
AST SERPL-CCNC: 22 U/L (ref 15–50)
BASOPHILS # BLD AUTO: 0.03 10*3/UL (ref 0–0.2)
BASOPHILS NFR BLD AUTO: 0.6 % (ref 0–3)
BILIRUB SERPL-MCNC: 0.39 MG/DL (ref 0.2–1.3)
BUN SERPL-MCNC: 12 MG/DL (ref 5–25)
BUN/CREAT SERPL: 12 {RATIO} (ref 6–20)
CALCIUM SERPL-MCNC: 9.6 MG/DL (ref 8.7–10.4)
CHLORIDE SERPL-SCNC: 104 MMOL/L (ref 99–111)
CONV ABS IMM GRAN: 0.01 10*3/UL (ref 0–0.2)
CONV CO2: 25 MMOL/L (ref 22–32)
CONV IMMATURE GRAN: 0.2 % (ref 0–1.8)
CONV TOTAL PROTEIN: 7.5 G/DL (ref 6.3–8.2)
CREAT UR-MCNC: 1 MG/DL (ref 0.5–0.9)
DEPRECATED RDW RBC AUTO: 41.2 FL (ref 36.4–46.3)
EOSINOPHIL # BLD AUTO: 0.08 10*3/UL (ref 0–0.7)
EOSINOPHIL # BLD AUTO: 1.6 % (ref 0–7)
ERYTHROCYTE [DISTWIDTH] IN BLOOD BY AUTOMATED COUNT: 12.5 % (ref 11.7–14.4)
GFR SERPLBLD BASED ON 1.73 SQ M-ARVRAT: >60 ML/MIN/{1.73_M2}
GLOBULIN UR ELPH-MCNC: 2.5 G/DL (ref 2–3.5)
GLUCOSE SERPL-MCNC: 81 MG/DL (ref 65–99)
HCT VFR BLD AUTO: 47.4 % (ref 37–47)
HGB BLD-MCNC: 14.9 G/DL (ref 12–16)
LYMPHOCYTES # BLD AUTO: 1.37 10*3/UL (ref 1–5)
LYMPHOCYTES NFR BLD AUTO: 27.1 % (ref 20–45)
MCH RBC QN AUTO: 28.4 PG (ref 27–31)
MCHC RBC AUTO-ENTMCNC: 31.4 G/DL (ref 33–37)
MCV RBC AUTO: 90.3 FL (ref 81–99)
MONOCYTES # BLD AUTO: 0.28 10*3/UL (ref 0.2–1.2)
MONOCYTES NFR BLD AUTO: 5.5 % (ref 3–10)
NEUTROPHILS # BLD AUTO: 3.29 10*3/UL (ref 2–8)
NEUTROPHILS NFR BLD AUTO: 65 % (ref 30–85)
NRBC CBCN: 0 % (ref 0–0.7)
OSMOLALITY SERPL CALC.SUM OF ELEC: 289 MOSM/KG (ref 273–304)
PLATELET # BLD AUTO: 296 10*3/UL (ref 130–400)
PMV BLD AUTO: 10.4 FL (ref 9.4–12.3)
POTASSIUM SERPL-SCNC: 4.5 MMOL/L (ref 3.5–5.3)
RBC # BLD AUTO: 5.25 10*6/UL (ref 4.2–5.4)
SODIUM SERPL-SCNC: 140 MMOL/L (ref 135–147)
T4 FREE SERPL-MCNC: 1.3 NG/DL (ref 0.9–1.8)
TSH SERPL-ACNC: 1.34 M[IU]/L (ref 0.27–4.2)
WBC # BLD AUTO: 5.06 10*3/UL (ref 4.8–10.8)

## 2020-09-15 LAB
ASO AB SERPL-ACNC: 316 [IU]/ML (ref 0–200)
CONV RHEUMATOID FACTOR IGM: <10 [IU]/ML (ref 0–14)
CRP SERPL-MCNC: 0.3 MG/L (ref 0–5)
DSDNA AB SER-ACNC: NEGATIVE [IU]/ML
ENA AB SER IA-ACNC: NEGATIVE {RATIO}
URATE SERPL-MCNC: 4.3 MG/DL (ref 2.5–7.5)

## 2020-09-28 ENCOUNTER — OFFICE VISIT CONVERTED (OUTPATIENT)
Dept: INTERNAL MEDICINE | Facility: CLINIC | Age: 35
End: 2020-09-28
Attending: PHYSICIAN ASSISTANT

## 2021-01-04 ENCOUNTER — HOSPITAL ENCOUNTER (OUTPATIENT)
Dept: OTHER | Facility: HOSPITAL | Age: 36
Discharge: HOME | End: 2021-01-04
Attending: NURSE PRACTITIONER

## 2021-01-04 LAB
ALBUMIN SERPL-MCNC: 4.7 G/DL (ref 3.5–5)
ALBUMIN/GLOB SERPL: 1.9 {RATIO} (ref 1.4–2.6)
ALP SERPL-CCNC: 67 U/L (ref 42–98)
ALT SERPL-CCNC: 14 U/L (ref 10–40)
ANION GAP SERPL CALC-SCNC: 13 MMOL/L (ref 8–19)
AST SERPL-CCNC: 17 U/L (ref 15–50)
BASOPHILS # BLD AUTO: 0.03 10*3/UL (ref 0–0.2)
BASOPHILS NFR BLD AUTO: 0.5 % (ref 0–3)
BILIRUB SERPL-MCNC: 0.41 MG/DL (ref 0.2–1.3)
BUN SERPL-MCNC: 6 MG/DL (ref 5–25)
BUN/CREAT SERPL: 7 {RATIO} (ref 6–20)
CALCIUM SERPL-MCNC: 9.2 MG/DL (ref 8.7–10.4)
CHLORIDE SERPL-SCNC: 103 MMOL/L (ref 99–111)
CONV ABS IMM GRAN: 0.02 10*3/UL (ref 0–0.2)
CONV CO2: 26 MMOL/L (ref 22–32)
CONV IMMATURE GRAN: 0.3 % (ref 0–1.8)
CONV TOTAL PROTEIN: 7.2 G/DL (ref 6.3–8.2)
CREAT UR-MCNC: 0.81 MG/DL (ref 0.5–0.9)
DEPRECATED RDW RBC AUTO: 37.8 FL (ref 36.4–46.3)
EOSINOPHIL # BLD AUTO: 0.09 10*3/UL (ref 0–0.7)
EOSINOPHIL # BLD AUTO: 1.5 % (ref 0–7)
ERYTHROCYTE [DISTWIDTH] IN BLOOD BY AUTOMATED COUNT: 12 % (ref 11.7–14.4)
GFR SERPLBLD BASED ON 1.73 SQ M-ARVRAT: >60 ML/MIN/{1.73_M2}
GLOBULIN UR ELPH-MCNC: 2.5 G/DL (ref 2–3.5)
GLUCOSE SERPL-MCNC: 87 MG/DL (ref 65–99)
HCT VFR BLD AUTO: 43.8 % (ref 37–47)
HGB BLD-MCNC: 14.4 G/DL (ref 12–16)
LYMPHOCYTES # BLD AUTO: 1.64 10*3/UL (ref 1–5)
LYMPHOCYTES NFR BLD AUTO: 28 % (ref 20–45)
MCH RBC QN AUTO: 28.3 PG (ref 27–31)
MCHC RBC AUTO-ENTMCNC: 32.9 G/DL (ref 33–37)
MCV RBC AUTO: 86.1 FL (ref 81–99)
MONOCYTES # BLD AUTO: 0.29 10*3/UL (ref 0.2–1.2)
MONOCYTES NFR BLD AUTO: 4.9 % (ref 3–10)
NEUTROPHILS # BLD AUTO: 3.79 10*3/UL (ref 2–8)
NEUTROPHILS NFR BLD AUTO: 64.8 % (ref 30–85)
NRBC CBCN: 0 % (ref 0–0.7)
OSMOLALITY SERPL CALC.SUM OF ELEC: 283 MOSM/KG (ref 273–304)
PLATELET # BLD AUTO: 274 10*3/UL (ref 130–400)
PMV BLD AUTO: 10.1 FL (ref 9.4–12.3)
POTASSIUM SERPL-SCNC: 4.4 MMOL/L (ref 3.5–5.3)
RBC # BLD AUTO: 5.09 10*6/UL (ref 4.2–5.4)
SODIUM SERPL-SCNC: 138 MMOL/L (ref 135–147)
TSH SERPL-ACNC: 1.17 M[IU]/L (ref 0.27–4.2)
WBC # BLD AUTO: 5.86 10*3/UL (ref 4.8–10.8)

## 2021-01-05 LAB — 25(OH)D3 SERPL-MCNC: 29.3 NG/ML (ref 30–100)

## 2021-01-08 ENCOUNTER — OFFICE VISIT CONVERTED (OUTPATIENT)
Dept: INTERNAL MEDICINE | Facility: CLINIC | Age: 36
End: 2021-01-08
Attending: NURSE PRACTITIONER

## 2021-01-08 ENCOUNTER — CONVERSION ENCOUNTER (OUTPATIENT)
Dept: INTERNAL MEDICINE | Facility: CLINIC | Age: 36
End: 2021-01-08

## 2021-01-22 ENCOUNTER — HOSPITAL ENCOUNTER (OUTPATIENT)
Dept: OTHER | Facility: HOSPITAL | Age: 36
Discharge: HOME | End: 2021-01-22
Attending: NURSE PRACTITIONER

## 2021-01-29 ENCOUNTER — OFFICE VISIT CONVERTED (OUTPATIENT)
Dept: INTERNAL MEDICINE | Facility: CLINIC | Age: 36
End: 2021-01-29
Attending: NURSE PRACTITIONER

## 2021-02-26 ENCOUNTER — HOSPITAL ENCOUNTER (OUTPATIENT)
Dept: GENERAL RADIOLOGY | Facility: HOSPITAL | Age: 36
Discharge: HOME | End: 2021-02-26

## 2021-04-09 ENCOUNTER — HOSPITAL ENCOUNTER (OUTPATIENT)
Dept: OTHER | Facility: HOSPITAL | Age: 36
Discharge: HOME | End: 2021-04-09
Attending: NURSE PRACTITIONER

## 2021-04-09 ENCOUNTER — OFFICE VISIT CONVERTED (OUTPATIENT)
Dept: INTERNAL MEDICINE | Facility: CLINIC | Age: 36
End: 2021-04-09
Attending: NURSE PRACTITIONER

## 2021-04-09 LAB
25(OH)D3 SERPL-MCNC: 31.4 NG/ML (ref 30–100)
ALBUMIN SERPL-MCNC: 4.7 G/DL (ref 3.5–5)
ALBUMIN/GLOB SERPL: 1.6 {RATIO} (ref 1.4–2.6)
ALP SERPL-CCNC: 72 U/L (ref 42–98)
ALT SERPL-CCNC: 23 U/L (ref 10–40)
ANION GAP SERPL CALC-SCNC: 16 MMOL/L (ref 8–19)
AST SERPL-CCNC: 27 U/L (ref 15–50)
BASOPHILS # BLD AUTO: 0.03 10*3/UL (ref 0–0.2)
BASOPHILS NFR BLD AUTO: 0.6 % (ref 0–3)
BILIRUB SERPL-MCNC: 0.46 MG/DL (ref 0.2–1.3)
BUN SERPL-MCNC: 10 MG/DL (ref 5–25)
BUN/CREAT SERPL: 9 {RATIO} (ref 6–20)
CALCIUM SERPL-MCNC: 9.7 MG/DL (ref 8.7–10.4)
CHLORIDE SERPL-SCNC: 103 MMOL/L (ref 99–111)
CONV ABS IMM GRAN: 0.03 10*3/UL (ref 0–0.2)
CONV CO2: 25 MMOL/L (ref 22–32)
CONV IMMATURE GRAN: 0.6 % (ref 0–1.8)
CONV TOTAL PROTEIN: 7.7 G/DL (ref 6.3–8.2)
CREAT UR-MCNC: 1.12 MG/DL (ref 0.5–0.9)
DEPRECATED RDW RBC AUTO: 41.9 FL (ref 36.4–46.3)
EOSINOPHIL # BLD AUTO: 0.08 10*3/UL (ref 0–0.7)
EOSINOPHIL # BLD AUTO: 1.5 % (ref 0–7)
ERYTHROCYTE [DISTWIDTH] IN BLOOD BY AUTOMATED COUNT: 12.9 % (ref 11.7–14.4)
FERRITIN SERPL-MCNC: 50 NG/ML (ref 10–200)
GFR SERPLBLD BASED ON 1.73 SQ M-ARVRAT: >60 ML/MIN/{1.73_M2}
GLOBULIN UR ELPH-MCNC: 3 G/DL (ref 2–3.5)
GLUCOSE SERPL-MCNC: 80 MG/DL (ref 65–99)
HCT VFR BLD AUTO: 46.1 % (ref 37–47)
HGB BLD-MCNC: 14.9 G/DL (ref 12–16)
IRON SATN MFR SERPL: 30 % (ref 20–55)
IRON SERPL-MCNC: 120 UG/DL (ref 60–170)
LYMPHOCYTES # BLD AUTO: 1.59 10*3/UL (ref 1–5)
LYMPHOCYTES NFR BLD AUTO: 30.6 % (ref 20–45)
MCH RBC QN AUTO: 28.5 PG (ref 27–31)
MCHC RBC AUTO-ENTMCNC: 32.3 G/DL (ref 33–37)
MCV RBC AUTO: 88.1 FL (ref 81–99)
MONOCYTES # BLD AUTO: 0.25 10*3/UL (ref 0.2–1.2)
MONOCYTES NFR BLD AUTO: 4.8 % (ref 3–10)
NEUTROPHILS # BLD AUTO: 3.21 10*3/UL (ref 2–8)
NEUTROPHILS NFR BLD AUTO: 61.9 % (ref 30–85)
NRBC CBCN: 0 % (ref 0–0.7)
OSMOLALITY SERPL CALC.SUM OF ELEC: 286 MOSM/KG (ref 273–304)
PLATELET # BLD AUTO: 309 10*3/UL (ref 130–400)
PMV BLD AUTO: 10.6 FL (ref 9.4–12.3)
POTASSIUM SERPL-SCNC: 4.9 MMOL/L (ref 3.5–5.3)
RBC # BLD AUTO: 5.23 10*6/UL (ref 4.2–5.4)
SODIUM SERPL-SCNC: 139 MMOL/L (ref 135–147)
TIBC SERPL-MCNC: 402 UG/DL (ref 245–450)
TRANSFERRIN SERPL-MCNC: 281 MG/DL (ref 250–380)
TSH SERPL-ACNC: 1.21 M[IU]/L (ref 0.27–4.2)
VIT B12 SERPL-MCNC: 478 PG/ML (ref 211–911)
WBC # BLD AUTO: 5.19 10*3/UL (ref 4.8–10.8)

## 2021-05-12 NOTE — PROGRESS NOTES
Progress Note      Patient Name: Audra Rendon   Patient ID: [id number]   Sex: Female   YOB: 1985    Primary Care Provider: Lori PERLA   Referring Provider: Lori PERLA    Visit Date: April 10, 2020    Provider: MINDA Martinez   Location: [facility]   Location Address: [address] [place] [address] 3  [place], [place]  [id number]   Location Phone: [phone]          History Of Present Illness  Video Conferencing Visit  Audra Rendon is a 34 year old /White female who is presenting for evaluation via video conferencing. Verbal consent obtained before beginning visit.   The following staff were present during this visit: Lori Norman NP; Tanya Hernandez MA   Audra Rendon is a 34 year old /White female who presents for evaluation and treatment of:      currently doing very well. She reports being bored and is having to work from home. Anxiety is currently tolerable. She has continued with healthy eating habits. She is not binge eating and is down to 124lbs. She feels great from a weight perspective. denies fatigue. reports that it has been nice to be at home more with the kids.    zoom conference       Past Medical History  Disease Name Date Onset Notes   Allergies --  --    Anemia --  --    Asthma --  --    Depression with anxiety --  --    Gall stones --  --    Head injury --  --    Hearing loss --  --    Kidney calculi --  --    Migraine --  --    Reflux --  --    Thyroid disorder --  --    Tinnitus --  --          Past Surgical History  Procedure Name Date Notes   Ablation --  --    Dilatation and curettage 2008 --    Hysterectomy --  --    Laparoscopic cholecystectomy 2007 --    Tubal ligation 2015 --    [place] Tooth Extraction --  --          Medication List  Name Date Started Instructions   acyclovir 400 mg oral tablet 03/04/2020 TAKE ONE TABLET BY MOUTH EVERY 8 HOURS AS NEEDED FOR COLD SORES   buspirone 10 mg oral  tablet  take 1 tablet (10 mg) by oral route 2 times per day   butalbital-acetaminophen-caff -40 mg oral tablet 01/16/2020 take 1 tablet by oral route every 4 hours as needed not to exceed 6 tablets per 24hrs   minocycline 100 mg oral tablet  take 1 tablet by oral route daily   Vyvanse 40 mg oral capsule  take 1 capsule (40 mg) by oral route once daily in the morning   Wellbutrin  mg oral tablet extended release 24 hr  take 1 tablet (150 mg) by oral route once daily         Allergy List  Allergen Name Date Reaction Notes   morphine --  --  --        Allergies Reconciled  Family Medical History  Disease Name Relative/Age Notes   Family history of cancer Aunt/  Grandfather (paternal)/   --    Family history of heart disease Grandfather (paternal)/   --          Social History  Finding Status Start/Stop Quantity Notes   Alcohol Never --/-- --  --    Tobacco Former --/-- --  --          Review of Systems  · Constitutional  o Admits  o : weight loss  o Denies  o : fever, fatigue, weight gain  · Cardiovascular  o Denies  o : lower extremity edema, claudication, chest pressure, palpitations  · Respiratory  o Denies  o : shortness of breath, wheezing, cough, hemoptysis, dyspnea on exertion  · Gastrointestinal  o Denies  o : nausea, vomiting, diarrhea, constipation, abdominal pain      Physical Examination     Gen: well-nourished, no acute distress  HENT: atraumatic, normocephalic  Eyes: extraocular movements intact, no scleral icterus  Lung: breathing comfortably, no cough  Skin: no visible rash, no lesions  Neuro: grossly oriented to person, place, and time. no facial droop   Psych: normal mood and affect             Assessment  · Anxiety disorder     300.00/F41.9  currently doing well  · Depression     311/F32.9    Problems Reconciled  Plan  · Orders  o ACO-39: Current medications updated and reviewed () - - 04/10/2020  · Medications  o Medications have been Reconciled  o Transition of Care or Provider  Policy  · Instructions  o Patient was educated/instructed on their diagnosis, treatment and medications prior to discharge from the clinic today.  o Call the office with any concerns or questions.  · Disposition  o Call or Return if symptoms worsen or persist.            Electronically Signed by: MINDA Martinez -Author on April 10, 2020 12:24:18 PM

## 2021-05-13 NOTE — PROGRESS NOTES
"   Progress Note      Patient Name: Audra Rendon   Patient ID: [id number]   Sex: Female   YOB: 1985    Primary Care Provider: Lori PERLA   Referring Provider: Lori PERLA    Visit Date: August 10, 2020    Provider: MINDA Martinez   Location: [facility]   Location Address: [address] [place] [address]  [id number]   Location Phone: [phone]          Chief Complaint  · \"I need a physical for school\"      History Of Present Illness  Audra Rendon is a 35 year old /White female who presents for evaluation and treatment of:      She is needing a physical for school.  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  She will also need titers for hep B, hep A, MMR, and varicella as well as two-step TB skin test.       Past Medical History  Disease Name Date Onset Notes   Allergies --  --    Anemia --  --    Asthma --  --    Depression with anxiety --  --    Gall stones --  --    Head injury --  --    Hearing loss --  --    Kidney calculi --  --    Migraine --  --    Reflux --  --    Thyroid disorder --  --    Tinnitus --  --          Past Surgical History  Procedure Name Date Notes   Ablation --  --    Dilatation and curettage 2008 --    Hysterectomy --  --    Laparoscopic cholecystectomy 2007 --    Tubal ligation 2015 --    [place] Tooth Extraction --  --          Medication List  Name Date Started Instructions   acyclovir 400 mg oral tablet 03/04/2020 TAKE ONE TABLET BY MOUTH EVERY 8 HOURS AS NEEDED FOR COLD SORES   bupropion HCl 150 mg oral tablet extended release 24 hr  --    buspirone 5 mg oral tablet  --    butalbital-acetaminophen-caff -40 mg oral tablet 07/20/2020 take 1 tablet by oral route every 4 hours as needed not to exceed 6 tablets per 24hrs   dextroamphetamine-amphetamine 10 mg oral tablet  --    minocycline 90 mg oral capsule,extended release 24hr  --    ProAir HFA 90 mcg/actuation inhalation HFA aerosol " "inhaler 07/21/2020 inhale 1 puff (90 mcg) by inhalation route every 4-6 hours as needed   Vyvanse 70 mg oral capsule  take 1 capsule (70 mg) by oral route once daily in the morning         Allergy List  Allergen Name Date Reaction Notes   morphine --  --  --        Allergies Reconciled  Family Medical History  Disease Name Relative/Age Notes   Family history of cancer Aunt/  Grandfather (paternal)/   --    Family history of heart disease Grandfather (paternal)/   --          Social History  Finding Status Start/Stop Quantity Notes   Alcohol Never --/-- --  --    Tobacco Former --/-- --  --          Review of Systems  · Constitutional  o Denies  o : fever, fatigue, weight loss, weight gain  · Cardiovascular  o Denies  o : lower extremity edema, claudication, chest pressure, palpitations  · Respiratory  o Denies  o : shortness of breath, wheezing, cough, hemoptysis, dyspnea on exertion  · Gastrointestinal  o Denies  o : nausea, vomiting, diarrhea, constipation, abdominal pain      Vitals  Date Time BP Position Site L\R Cuff Size HR RR TEMP (F) WT  HT  BMI kg/m2 BSA m2 O2 Sat HC       02/14/2020 03:14 /88 Sitting    83 - R 15 98 133lbs 0oz 5'  7\" 20.83 1.69 100 %    07/21/2020 08:08 /68 Sitting    70 - R  98.3 130lbs 6oz 5'  7\" 20.42 1.67 100 %    08/10/2020 08:10 /82 Sitting    60 - R 18 98.3 133lbs 0oz 5'  7\" 20.83 1.69 98 %          Physical Examination  · Constitutional  o Appearance  o : no acute distress, well-nourished  · Head and Face  o Head  o :   § Inspection  § : atraumatic, normocephalic  · Eyes  o Eyes  o : extraocular movements intact, no scleral icterus, no conjunctival injection  · Ears, Nose, Mouth and Throat  o Ears  o :   § External Ears  § : normal  § Otoscopic Examination  § : tympanic membrane appearance within normal limits bilaterally  o Nose  o :   § Intranasal Exam  § : nares patent  o Oral Cavity  o :   § Oral Mucosa  § : moist mucous membranes  o Throat  o : "   § Oropharynx  § : no inflammation or lesions present, tonsils within normal limits  · Respiratory  o Respiratory Effort  o : breathing comfortably, symmetric chest rise  o Auscultation of Lungs  o : clear to asculatation bilaterally, no wheezes, rales, or rhonchii  · Cardiovascular  o Heart  o :   § Auscultation of Heart  § : regular rate and rhythm, no murmurs, rubs, or gallops  o Peripheral Vascular System  o :   § Extremities  § : no edema  · Gastrointestinal  o Abdominal Examination  o :   § Abdomen  § : bowel sounds present, non-distended, non-tender  · Lymphatic  o Neck  o : no lymphadenopathy present  · Skin and Subcutaneous Tissue  o General Inspection  o : no lesions present, no areas of discoloration, skin turgor normal  · Neurologic  o Mental Status Examination  o :   § Orientation  § : grossly oriented to person, place and time  o Gait and Station  o :   § Gait Screening  § : normal gait  · Psychiatric  o General  o : normal mood and affect              Assessment  · Annual physical exam     V70.0/Z00.00  We will draw titers today. Believe other vaccinations are up-to-date including Tdap which she will get records from Dr. Carolina's office. She will return next week for second step of PPD. She does not have a form in the office today for completion. She will bring this to us once available.    Problems Reconciled  Plan  · Orders  o ACO-39: Current medications updated and reviewed () - - 08/10/2020  o Hepatitis B antibody titer (20011, 97064, 46227) - V70.0/Z00.00 - 08/10/2020  o MMR Antibody Panel [facility] (63284, 98696, 50875) - V70.0/Z00.00 - 08/10/2020  o Hepatitis A antibody (75433) - V70.0/Z00.00 - 08/10/2020  o PPD (TB Skin Test) (06696) - - 08/10/2020  o Varicella virus antibody assay (69623) - V70.0/Z00.00 - 08/10/2020  · Medications  o Medications have been Reconciled  o Transition of Care or Provider Policy  · Instructions  o Reviewed health maintenance flowsheet and updated information.  Orders were placed and/or patient's response was documented.  o Patient was educated/instructed on their diagnosis, treatment and medications prior to discharge from the clinic today.  o Call the office with any concerns or questions.            Electronically Signed by: MINDA Martinez -Author on August 10, 2020 10:01:36 AM

## 2021-05-13 NOTE — PROGRESS NOTES
"   Progress Note      Patient Name: Audra Rendon   Patient ID: [id number]   Sex: Female   YOB: 1985    Primary Care Provider: Lori PERLA   Referring Provider: Lori PERLA    Visit Date: July 21, 2020    Provider: MINDA Soares   Location: [facility]   Location Address: [address] [place] [address], [address]  [place], [place]  [id number]   Location Phone: [phone]          Chief Complaint  · \"I have been having migraines a lot lately.\"  · \"I had an asthma attack at work on Thursday.\"      History Of Present Illness  Audra Rendon is a 35 year old /White female who presents for evaluation and treatment of:      follow up    Asthma- exercised induced asthma; reports that she had an asthma attack last week while at work in a mask and couldn't catch her breath. Used albuterol inhaler and improved symptoms right away. Using the albuterol about 2 days a week. Needing a refill on her inhaler.     would like blood work performed  fatigued and feels this is related to her vitamin D deficiency or thyroid.     Has been grinding teeth in her sleep with a lot of tension in the jaw causing headaches. Has been using Fiorcet more often like 4 days a week for relief. Use to have occular migraines, but these are more related to stress and tension per the patient. Light, sound hurt and feels like she is having a squeezing sensation.      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  Single mother to 5 children.     Binge eat, bipolar II :Vyvanse- for ADHD and adderall as needed at lunch, this is really helping her focus and has lost 35lbs over the past year in a healthy way.     Anxiety- buspar and wellbutrin. Debra De Los Santos at [facility]. Wanting to go back amitriptyline, plans to discuss with Debra.     No further concerns at this time.             Past Medical History  Disease Name Date Onset Notes   Allergies --  --    Anemia --  --    Asthma --  --    Depression with " anxiety --  --    Gall stones --  --    Head injury --  --    Hearing loss --  --    Kidney calculi --  --    Migraine --  --    Reflux --  --    Thyroid disorder --  --    Tinnitus --  --          Past Surgical History  Procedure Name Date Notes   Ablation --  --    Dilatation and curettage 2008 --    Hysterectomy --  --    Laparoscopic cholecystectomy 2007 --    Tubal ligation 2015 --    [place] Tooth Extraction --  --          Medication List  Name Date Started Instructions   acyclovir 400 mg oral tablet 03/04/2020 TAKE ONE TABLET BY MOUTH EVERY 8 HOURS AS NEEDED FOR COLD SORES   albuterol sulfate oral  --    bupropion HCl 150 mg oral tablet extended release 24 hr  --    buspirone 5 mg oral tablet  --    butalbital-acetaminophen-caff -40 mg oral tablet 07/20/2020 take 1 tablet by oral route every 4 hours as needed not to exceed 6 tablets per 24hrs   dextroamphetamine-amphetamine 10 mg oral tablet  --    Esgic -40 mg oral tablet  --    minocycline 90 mg oral capsule,extended release 24hr  --    Vyvanse 70 mg oral capsule  take 1 capsule (70 mg) by oral route once daily in the morning         Allergy List  Allergen Name Date Reaction Notes   morphine --  --  --          Family Medical History  Disease Name Relative/Age Notes   Family history of cancer Aunt/  Grandfather (paternal)/   --    Family history of heart disease Grandfather (paternal)/   --          Social History  Finding Status Start/Stop Quantity Notes   Alcohol Never --/-- --  --    Tobacco Former --/-- --  --          Review of Systems  · Constitutional  o Denies  o : fever, fatigue, weight loss, weight gain  · HENT  o Admits  o : headaches, dental pain  o Denies  o : sinus pain, nasal congestion, nasal discharge, sore throat, ear pain, ear fullness  · Cardiovascular  o Denies  o : lower extremity edema, claudication, chest pressure, palpitations  · Respiratory  o Denies  o : shortness of breath, wheezing, frequent cough, hemoptysis,  "dyspnea on exertion  · Gastrointestinal  o Denies  o : nausea, vomiting, diarrhea, constipation, abdominal pain  · Neurologic  o Denies  o : altered mental status, muscular weakness  · Psychiatric  o Admits  o : anxiety  o Denies  o : depression, impulsive behaviors, suicidal ideation, homicidal ideation      Vitals  Date Time BP Position Site L\R Cuff Size HR RR TEMP (F) WT  HT  BMI kg/m2 BSA m2 O2 Sat HC       02/07/2020 11:30 /86 Sitting    86 - R 14 98.2 136lbs 0oz 5'  7\" 21.3 1.71 98 %    02/14/2020 03:14 /88 Sitting    83 - R 15 98 133lbs 0oz 5'  7\" 20.83 1.69 100 %    07/21/2020 08:08 /68 Sitting    70 - R  98.3 130lbs 6oz 5'  7\" 20.42 1.67 100 %          Physical Examination  · Constitutional  o Appearance  o : no acute distress, well-nourished  · Head and Face  o Head  o :   § Inspection  § : atraumatic, normocephalic  · Ears, Nose, Mouth and Throat  o Ears  o :   § External Ears  § : normal  § Otoscopic Examination  § : tympanic membrane appearance within normal limits bilaterally  o Nose  o :   § Intranasal Exam  § : nares patent  o Oral Cavity  o :   § Oral Mucosa  § : moist mucous membranes  o Throat  o :   § Oropharynx  § : no inflammation or lesions present, tonsils within normal limits  · Neck  o Thyroid  o : gland size normal, nontender, no nodules or masses present on palpation, symmetric  · Respiratory  o Respiratory Effort  o : breathing comfortably, symmetric chest rise  o Auscultation of Lungs  o : clear to asculatation bilaterally, no wheezes, rales, or rhonchii  · Cardiovascular  o Heart  o :   § Auscultation of Heart  § : regular rate and rhythm, no murmurs, rubs, or gallops  o Peripheral Vascular System  o :   § Extremities  § : no edema  · Gastrointestinal  o Abdominal Examination  o : abdomen nontender to palpation, normal bowel sounds, tone normal without rigidity or guarding, no masses present, abdomen scaphoid upon supine  · Lymphatic  o Neck  o : no lymphadenopathy " present  o Supraclavicular Nodes  o : no supraclavicular nodes  · Skin and Subcutaneous Tissue  o General Inspection  o : no lesions present, no areas of discoloration, skin turgor normal  · Neurologic  o Mental Status Examination  o :   § Orientation  § : grossly oriented to person, place and time  o Gait and Station  o :   § Gait Screening  § : normal gait          Results  · In-Office Procedures  o Lab procedure  § IOP - Urine Drug Screen In-House [facility] (81790)   § Amphetamines Ur Ql: Positive   § Barbiturates Ur Ql: Negative   § Buprenorphine+Nor Ur Ql Scn: Negative   § Benzodiaz Ur Ql: Negative   § Cocaine Ur Ql: Negative   § Methadone Ur Ql: Negative   § Methamphet Ur Ql: Negative   § MDMA Ur Ql Scn: Negative   § Opiates Ur Ql: Negative   § Oxycodone Ur Ql: Negative   § PCP Ur Ql: Negative   § THC Ur Ql: Negative   § Temp in Range?: Within/Acceptable   § Control Seen?: Yes       Assessment  · Depression with anxiety     300.4/F41.8  · Migraine     346.90/G43.909  Discussed poor control of her migraines. Patient is to call the dentist to get refitted for a mouthguard related to grinding her teeth. If fixing this problem and her high stress with medication change does not help to resolve the migraines we discussed starting a maintenance like medication such as Emgality  · Annual physical exam     V70.0/Z00.00  Labs ordered.  · Asthma     493.90/J45.909  We will refill her albuterol inhaler at this time.  · Fatigue     780.79/R53.83  · Encounter for medication management     V58.69/Z79.899  · ADHD     314.01/F90.9  Patient seeing Debra De Los [facility] at [facility] for ADHD, binge eating disorder, bipolar 2 disorder. Plans to see her soon related to medication change in her anxiety.  · Binge eating disorder     307.50/F50.81  · Bipolar 2 disorder     296.89/F31.81    Problems Reconciled  Plan  · Orders  o Physical, Primary Care Panel (CBC, CMP, Lipid, TSH) [facility] (63502, 14321, 62220, 27193) - V70.0/Z00.00 - 07/21/2020  o Iron  panel (iron, TIBC, transferrin saturation) (39314, 37162, 47753) - 780.79/R53.83 - 07/21/2020  o Vitamin D (25-Hydroxy) Level (33482) - V70.0/Z00.00, V58.69/Z79.899 - 07/21/2020  o RADHA Report (KASPR) - V58.69/Z79.899 - 07/21/2020  o ACO-39: Current medications updated and reviewed () - - 07/21/2020  · Medications  o ProAir HFA 90 mcg/actuation inhalation HFA aerosol inhaler   SIG: inhale 1 puff (90 mcg) by inhalation route every 4-6 hours as needed   DISP: (1) 8.5 gm canister with 1 refills  Prescribed on 07/21/2020     o Medications have been Reconciled  o Transition of Care or Provider Policy  · Instructions  o Reviewed health maintenance flowsheet and updated information. Orders were placed and/or patient's response was documented.  o Patient was educated/instructed on their diagnosis, treatment and medications prior to discharge from the clinic today.  o Call the office with any concerns or questions.  · Disposition  o Call or Return if symptoms worsen or persist.  o Meds sent to pharmacy  o 6 month follow up  o Labs Drawn In-House  o Lori Norman PCP            Electronically Signed by: MINDA Soares -Author on July 21, 2020 10:26:25 AM

## 2021-05-13 NOTE — PROGRESS NOTES
Progress Note      Patient Name: Audra Rendon   Patient ID: [id number]   Sex: Female   YOB: 1985    Primary Care Provider: Lori PERLA   Referring Provider: Lori PERLA    Visit Date: July 1, 2020    Provider: Emily Philip PA-C   Location: [facility]   Location Address: [address] [place] [address]  [id number]   Location Phone: [phone]          Chief Complaint  · vaginal burning   · vaginal discharge       History Of Present Illness  Video Conferencing Visit  Audra Rendon is a 35 year old /White female who is presenting for evaluation via video conferencing via zoom. Verbal consent obtained before beginning visit.   The following staff were present during this visit: Emily Philip Pa-C   Audra Rendon is a 35 year old /White female who presents for evaluation and treatment of:      vaginal discharge and burning. Denies vaginal itching or odor.   New sex partner in March.   Denies dysuria, frequency, incontinence, hematuria, abd pain, NVDC, fever.  pt c/o recurrent BV and yeast infections since hysterectomy in Aug.  Last infection was 4 months ago.  Denies new soaps. She denies douching. She takes showers only.       Past Medical History  Disease Name Date Onset Notes   Allergies --  --    Anemia --  --    Asthma --  --    Depression with anxiety --  --    Gall stones --  --    Head injury --  --    Hearing loss --  --    Kidney calculi --  --    Migraine --  --    Reflux --  --    Thyroid disorder --  --    Tinnitus --  --          Past Surgical History  Procedure Name Date Notes   Ablation --  --    Dilatation and curettage 2008 --    Hysterectomy --  --    Laparoscopic cholecystectomy 2007 --    Tubal ligation 2015 --    [place] Tooth Extraction --  --          Medication List  Name Date Started Instructions   acyclovir 400 mg oral tablet 03/04/2020 TAKE ONE TABLET BY MOUTH EVERY 8 HOURS AS NEEDED FOR COLD  SORES   buspirone 10 mg oral tablet  take 1 tablet (10 mg) by oral route 2 times per day   butalbital-acetaminophen-caff -40 mg oral tablet 01/16/2020 take 1 tablet by oral route every 4 hours as needed not to exceed 6 tablets per 24hrs   minocycline 100 mg oral tablet  take 1 tablet by oral route daily   Vyvanse 40 mg oral capsule  take 1 capsule (40 mg) by oral route once daily in the morning   Wellbutrin  mg oral tablet extended release 24 hr  take 1 tablet (150 mg) by oral route once daily         Allergy List  Allergen Name Date Reaction Notes   morphine --  --  --        Allergies Reconciled  Family Medical History  Disease Name Relative/Age Notes   Family history of cancer Aunt/  Grandfather (paternal)/   --    Family history of heart disease Grandfather (paternal)/   --          Social History  Finding Status Start/Stop Quantity Notes   Alcohol Never --/-- --  --    Tobacco Former --/-- --  --          Review of Systems  · Constitutional  o Denies  o : fever, fatigue, weight loss, weight gain  · Cardiovascular  o Denies  o : lower extremity edema, claudication, chest pressure, palpitations  · Respiratory  o Denies  o : shortness of breath, wheezing, frequent cough, hemoptysis, dyspnea on exertion  · Gastrointestinal  o Denies  o : nausea, vomiting, diarrhea, constipation, abdominal pain  · Genitourinary  o Admits  o : vaginal discharge  o Denies  o : urinary urgency, urinary frequency, dysuria, hematuria      Physical Examination     Gen: well-nourished, no acute distress  HENT: atraumatic, normocephalic  Eyes: extraocular movements intact, no scleral icterus  Lung: breathing comfortably, no cough  Skin: no visible rash, no lesions  Neuro: grossly oriented to person, place, and time. no facial droop   Psych: normal mood and affect                   Assessment  · Vaginitis     616.10/N76.0  discussed vaginitis, since pt has had recurrent issues, we will wait for results of swabs to tx  appropriately. will test for STD as well due to new partner. Pt understands and agrees  · High risk sexual behavior     V69.2/Z72.51      Plan  · Orders  o ACO-39: Current medications updated and reviewed () - - 07/01/2020  o Vaginal Screen (Candida, Gardnerella & Trichomonas) (27857) - 616.10/N76.0, V69.2/Z72.51 - 07/02/2020  o Chlamydia/GC by PCR (Urine or Vaginal Swab) [facility] ([facility]) - 616.10/N76.0, V69.2/Z72.51 - 07/02/2020  · Medications  o Medications have been Reconciled  o Transition of Care or Provider Policy  · Instructions  o Patient was educated/instructed on their diagnosis, treatment and medications prior to discharge from the clinic today.  · Disposition  o Call or Return if symptoms worsen or persist.            Electronically Signed by: Emily Philip PA-C -Author on July 1, 2020 04:24:12 PM

## 2021-05-13 NOTE — PROGRESS NOTES
Progress Note      Patient Name: Audra Rendon   Patient ID: [id number]   Sex: Female   YOB: 1985    Primary Care Provider: Lori PERLA   Referring Provider: Lori PERLA    Visit Date: September 28, 2020    Provider: Emily Philip PA-C   Location: [facility]   Location Address: [address] [place] [address]  [id number]   Location Phone: [phone]          Chief Complaint  · f/u   · anxiety   · depression   · fatigue   · joint pain       History Of Present Illness  Audra Rendon is a 35 year old /White female who presents for evaluation and treatment of:      f/u- fatigue, joint pain- continues with no improvement.    Bilateral hand pains: she has been typing a lot recently. She got a new job and thinks this will help.  Pt takes motrin prn.     Pt quit her job because it was so stressful  She has not had dizziness, palpitaitons, or cp since quitting.     Psych: recently changed from Adderall to vyanse   She states anxiety and depression has been stable recently.     Fatigue: admits to only eating 500 calories /day at times  hx of binge eating disorder. she denies recent issue with binging.   states she does not have an appetite after taking stimulant medication       Past Medical History  Disease Name Date Onset Notes   Allergies --  --    Anemia --  --    Asthma --  --    Depression with anxiety --  --    Gall stones --  --    Head injury --  --    Hearing loss --  --    Kidney calculi --  --    Migraine --  --    Reflux --  --    Thyroid disorder --  --    Tinnitus --  --          Past Surgical History  Procedure Name Date Notes   Ablation --  --    Dilatation and curettage 2008 --    Hysterectomy --  --    Laparoscopic cholecystectomy 2007 --    Tubal ligation 2015 --    [place] Tooth Extraction --  --          Medication List  Name Date Started Instructions   acyclovir 400 mg oral tablet 03/04/2020 TAKE ONE TABLET BY MOUTH  EVERY 8 HOURS AS NEEDED FOR COLD SORES   amitriptyline 25 mg oral tablet  take 1 tablet (25 mg) by oral route once daily at bedtime   bupropion HCl 150 mg oral tablet extended release 24 hr  --    buspirone 5 mg oral tablet  --    butalbital-acetaminophen-caff -40 mg oral tablet 07/20/2020 take 1 tablet by oral route every 4 hours as needed not to exceed 6 tablets per 24hrs   dextroamphetamine-amphetamine 10 mg oral tablet  --    ProAir HFA 90 mcg/actuation inhalation HFA aerosol inhaler 07/21/2020 inhale 1 puff (90 mcg) by inhalation route every 4-6 hours as needed   Vyvanse 70 mg oral capsule  take 1 capsule (70 mg) by oral route once daily in the morning         Allergy List  Allergen Name Date Reaction Notes   morphine --  --  --        Allergies Reconciled  Family Medical History  Disease Name Relative/Age Notes   Family history of cancer Aunt/  Grandfather (paternal)/   --    Family history of heart disease Grandfather (paternal)/   --          Social History  Finding Status Start/Stop Quantity Notes   Alcohol Never --/-- --  --    Tobacco Former --/-- --  --          Review of Systems  · Constitutional  o Admits  o : fatigue  o Denies  o : fever, weight gain, weight loss, chills  · Eyes  o Denies  o : changes in vision  · HENT  o Denies  o : ear pain, sore throat  · Cardiovascular  o Denies  o : chest Pain, palpitations, edema (swelling)  · Respiratory  o Denies  o : frequent cough, shortness of breath  · Gastrointestinal  o Denies  o : nausea, vomiting, changes in bowel habits  · Genitourinary  o Denies  o : dysuria, urinary frequency, urinary urgency, polyuria  · Integument  o Denies  o : rash  · Neurologic  o Denies  o : headache, tingling or numbness, dizziness  · Musculoskeletal  o Admits  o : joint pain, hand pain  o Denies  o : myalgias  · Endocrine  o Denies  o : polydipsia, polyphagia  · Psychiatric  o Admits  o : anxiety, decreased concentration, depression  o Denies  o : panic attacks, sleep  "distrubances, mood changes, memory changes, SI/HI  · Heme-Lymph  o Denies  o : easy bruising, easy bleeding, lymph node enlargement or tenderness  · Allergic-Immunologic  o Denies  o : eczema, seasonal allergies, urticaria      Vitals  Date Time BP Position Site L\R Cuff Size HR RR TEMP (F) WT  HT  BMI kg/m2 BSA m2 O2 Sat HC       08/10/2020 08:10 /82 Sitting    60 - R 18 98.3 133lbs 0oz 5'  7\" 20.83 1.69 98 %    09/11/2020 08:09 AM 98/60 Sitting    74 - R 15 98.2 128lbs 0oz 5'  7\" 20.05 1.66 100 %    09/28/2020 09:21 /70 Sitting    79 - R 15 98.1 126lbs 16oz 5'  7\" 19.89 1.65 97 %          Physical Examination  · Constitutional  o Appearance  o : no acute distress, well-nourished  · Head and Face  o Head  o :   § Inspection  § : atraumatic, normocephalic  · Eyes  o Eyes  o : extraocular movements intact, no scleral icterus, no conjunctival injection  · Ears, Nose, Mouth and Throat  o Ears  o :   § External Ears  § : normal  o Nose  o :   § Intranasal Exam  § : nares patent  o Oral Cavity  o :   § Oral Mucosa  § : moist mucous membranes  · Neck  o Thyroid  o : gland size normal, nontender, no nodules or masses present on palpation, symmetric  · Respiratory  o Respiratory Effort  o : breathing comfortably, symmetric chest rise  o Auscultation of Lungs  o : clear to asculatation bilaterally, no wheezes, rales, or rhonchii  · Cardiovascular  o Heart  o :   § Auscultation of Heart  § : regular rate and rhythm, no murmurs, rubs, or gallops  o Peripheral Vascular System  o :   § Extremities  § : no edema  · Gastrointestinal  o Abdominal Examination  o :   § Abdomen  § : bowel sounds present, non-distended, non-tender  · Skin and Subcutaneous Tissue  o General Inspection  o : no lesions present, no areas of discoloration, skin turgor normal  · Neurologic  o Mental Status Examination  o :   § Orientation  § : grossly oriented to person, place and time  o Gait and Station  o :   § Gait Screening  § : normal " gait  · Psychiatric  o General  o : normal mood and affect          Assessment  · Anxiety disorder     300.00/F41.9  Pt will cont to follow up with psych for med mgmt. and counseling.  · Depression     311/F32.9  Cont follow up with psych. To er if s/hi  · Fatigue     780.79/R53.83  discussed ddx fatigue. encouraged follow up with psych to discuss possible side effects of meds vs uncontrolled mood sx. Discussed lab results. Educated pt on proper nutrition and increasing caloric intake. given handout on healthy diet.  · Joint pain     719.40/M25.50  Discussed ddx joint pain. Can use Tylenol arthritis prn.  · Abnormal kidney function     593.9/N28.9  discussed elevated Cr. increase water intake, limit nsaids, although can be used sparingly with joint pain. Will recheck labs in 3 months at follow up.  · Dizziness     780.4/R42  resolved since quitting her job.      Plan  · Orders  o ACO-39: Current medications updated and reviewed (1159F, ) - - 09/28/2020  · Medications  o Medications have been Reconciled  o Transition of Care or Provider Policy  · Instructions  o Handouts were given to patient: healthy diet  o Patient was educated/instructed on their diagnosis, treatment and medications prior to discharge from the clinic today.  o Electronically Identified Patient Education Materials Provided Electronically  · Disposition  o Call or Return if symptoms worsen or persist.  o Follow up in 3 months  o Patient sent to [facility] for admission            Electronically Signed by: Emily Philip PA-C -Author on September 28, 2020 09:11:54 PM

## 2021-05-13 NOTE — PROGRESS NOTES
Progress Note      Patient Name: Audra Rendon   Patient ID: [id number]   Sex: Female   YOB: 1985    Primary Care Provider: Lori PERLA   Referring Provider: Lori PERLA    Visit Date: September 11, 2020    Provider: Emily Philip PA-C   Location: [facility]   Location Address: [address] [place] [address], [address]  [place], [place]  [id number]   Location Phone: [phone]          Chief Complaint  · wants to be tested for autoimmune disease      History Of Present Illness  Audra Rendon is a 35 year old /White female who presents for evaluation and treatment of:      Numerous complaints: joint pain, fatigue, chest pain, right eye twitching, swollen lymph nodes, dizziness  all symptoms began in the last 2 weeks.    Pt states overwhelming fatigue started first.   She has never slept well.   She states the only time she has felt like this was when her thyroid was off or when she was anemic.     Mood: has been stable on current meds.   Depression well controlled.   high stress life but no worsening.   Denies recent manic episodes or si/hi.   She is seeing [facility] for counseling and med management.   She added amitriptyline in the past month- she has been cutting it in to 1/8.     She states chest pain has been coming and going the past few months. Feels like stinging.   Denies associated sob, resp distress, wheezing.     R eye top lid twitching started 2 wks ago.   Denies vision loss or vision changes.  She drinks lots of water daily.     She admits to dizziness last night. Room was spinning for a few min then sx resolved on own. Denies syncope, loc.   No currently having dizziness.   No associated cp with the dizziness episode.  Pt does not eat regularly. She takes Vyvance for binge eating disorder so skips meals at times.    LAD in groin swollen since this am. They are tender to touch. Denies redness or warmth. Denies fever.  Denies dysuria, frequency,  hematuria.   Denies vaginal discharge, odor. Denies concern for std.   Last bm yesterday normal.     Joint pain: pain in hips, knees, and hands bilaterally x 2wk. Painful to be touched.  Touching joints at all causes pain.   denies fhx of rheumatoid arthritis.       Past Medical History  Disease Name Date Onset Notes   Allergies --  --    Anemia --  --    Asthma --  --    Depression with anxiety --  --    Gall stones --  --    Head injury --  --    Hearing loss --  --    Kidney calculi --  --    Migraine --  --    Reflux --  --    Thyroid disorder --  --    Tinnitus --  --          Past Surgical History  Procedure Name Date Notes   Ablation --  --    Dilatation and curettage 2008 --    Hysterectomy --  --    Laparoscopic cholecystectomy 2007 --    Tubal ligation 2015 --    [place] Tooth Extraction --  --          Medication List  Name Date Started Instructions   acyclovir 400 mg oral tablet 03/04/2020 TAKE ONE TABLET BY MOUTH EVERY 8 HOURS AS NEEDED FOR COLD SORES   amitriptyline 25 mg oral tablet  take 1 tablet (25 mg) by oral route once daily at bedtime   bupropion HCl 150 mg oral tablet extended release 24 hr  --    buspirone 5 mg oral tablet  --    butalbital-acetaminophen-caff -40 mg oral tablet 07/20/2020 take 1 tablet by oral route every 4 hours as needed not to exceed 6 tablets per 24hrs   dextroamphetamine-amphetamine 10 mg oral tablet  --    ProAir HFA 90 mcg/actuation inhalation HFA aerosol inhaler 07/21/2020 inhale 1 puff (90 mcg) by inhalation route every 4-6 hours as needed   Vyvanse 70 mg oral capsule  take 1 capsule (70 mg) by oral route once daily in the morning         Allergy List  Allergen Name Date Reaction Notes   morphine --  --  --        Allergies Reconciled  Family Medical History  Disease Name Relative/Age Notes   Family history of cancer Aunt/  Grandfather (paternal)/   --    Family history of heart disease Grandfather (paternal)/   --          Social History  Finding Status  "Start/Stop Quantity Notes   Alcohol Never --/-- --  --    Tobacco Former --/-- --  --          Review of Systems  · Constitutional  o Admits  o : fatigue  o Denies  o : fever, weight gain, weight loss, chills  · Eyes  o Denies  o : changes in vision  · HENT  o Denies  o : ear pain, sore throat  · Cardiovascular  o Admits  o : chest Pain  o Denies  o : palpitations, edema (swelling)  · Respiratory  o Denies  o : frequent cough, shortness of breath  · Gastrointestinal  o Denies  o : nausea, vomiting, changes in bowel habits  · Genitourinary  o Denies  o : dysuria, urinary frequency, urinary urgency, polyuria  · Integument  o Denies  o : rash  · Neurologic  o Admits  o : dizziness  o Denies  o : headache, tingling or numbness  · Musculoskeletal  o Admits  o : joint pain  o Denies  o : myalgias  · Endocrine  o Denies  o : polydipsia, polyphagia  · Psychiatric  o Denies  o : mood changes, memory changes, SI/HI  · Heme-Lymph  o Denies  o : easy bruising, easy bleeding, lymph node enlargement or tenderness  · Allergic-Immunologic  o Denies  o : eczema, seasonal allergies, urticaria      Vitals  Date Time BP Position Site L\R Cuff Size HR RR TEMP (F) WT  HT  BMI kg/m2 BSA m2 O2 Sat HC       02/14/2020 03:14 /88 Sitting    83 - R 15 98 133lbs 0oz 5'  7\" 20.83 1.69 100 %    07/21/2020 08:08 /68 Sitting    70 - R  98.3 130lbs 6oz 5'  7\" 20.42 1.67 100 %    08/10/2020 08:10 /82 Sitting    60 - R 18 98.3 133lbs 0oz 5'  7\" 20.83 1.69 98 %    09/11/2020 08:09 AM 98/60 Sitting    74 - R 15 98.2 128lbs 0oz 5'  7\" 20.05 1.66 100 %          Physical Examination  · Constitutional  o Appearance  o : no acute distress, well-nourished  · Head and Face  o Head  o :   § Inspection  § : atraumatic, normocephalic  · Eyes  o Eyes  o : extraocular movements intact, no scleral icterus, no conjunctival injection  · Ears, Nose, Mouth and Throat  o Ears  o :   § External Ears  § : normal  § Otoscopic Examination  § : tympanic " membrane appearance within normal limits bilaterally  o Nose  o :   § Intranasal Exam  § : nares patent  o Oral Cavity  o :   § Oral Mucosa  § : moist mucous membranes  o Throat  o :   § Oropharynx  § : no inflammation or lesions present, tonsils within normal limits  · Neck  o Thyroid  o : gland size normal, nontender, no nodules or masses present on palpation, symmetric  · Respiratory  o Respiratory Effort  o : breathing comfortably, symmetric chest rise  o Auscultation of Lungs  o : clear to asculatation bilaterally, no wheezes, rales, or rhonchii  · Cardiovascular  o Heart  o :   § Auscultation of Heart  § : regular rate and rhythm, no murmurs, rubs, or gallops  o Peripheral Vascular System  o :   § Extremities  § : no edema  · Gastrointestinal  o Abdominal Examination  o :   § Abdomen  § : bowel sounds present, non-distended, non-tender  · Lymphatic  o Neck  o : no lymphadenopathy present  o Supraclavicular Nodes  o : no supraclavicular nodes  · Skin and Subcutaneous Tissue  o General Inspection  o : no lesions present, no areas of discoloration, skin turgor normal  · Neurologic  o Mental Status Examination  o :   § Orientation  § : grossly oriented to person, place and time  o Cranial Nerves  o : crainial nerves 2-12 grossly intact  o Gait and Station  o :   § Gait Screening  § : normal gait  · Psychiatric  o General  o : normal mood and affect              Assessment  · Fatigue     780.79/R53.83  Labs today. Discussed ddx fatigue, sleep hygiene, diet, and exercise.  · Depression with anxiety     300.4/F41.8  cont follow up with psych for counseling and med mgmt.  · Joint pain     719.40/M25.50  discussed ddx joint pain, will order rheumatoid labs today to r/o cause of pain. discussed fibromyalgia as possible culprit with pt mood symptoms as well.  · Eyelid twitch     781.0/R25.3  Will order electrolytes. Cont hydration. encouraged to get eye eval to check vision if any vision changes  occur.  · Lymphadenopathy     785.6/R59.1  Discussed no LAD palpable on exam. Will order CBC to eval.  · Dizziness     780.4/R42  Discussed ddx dizziness. Sx have completely resolved and not returned. increase water intake. discussed importance of 3 meals daily and not skipping meals. Limit/avoid caffeine. RTC if sx return/worsen, syncope, loc, seizure, cp, palpitations, unilateral weakness, confusion. Pt understands and agrees      Plan  · Orders  o CBC with Auto Diff [facility] ([id number]) - 780.79/R53.83, 781.0/R25.3, 785.6/R59.1 - 09/11/2020  o CMP [facility] ([id number]) - 780.79/R53.83, 781.0/R25.3, 785.6/R59.1 - 09/11/2020  o Thyroid Profile (81376, 32165, THYII) - 719.40/M25.50, 780.79/R53.83 - 09/11/2020  o ACO-39: Current medications updated and reviewed () - - 09/11/2020  o RA Profile 1 (Uric Acid, ASO, RF IgM, DERICK, CRP) [facility] ([id number], 14906, [id number], 51518, [id number]) - 300.4/F41.8, 719.40/M25.50, 780.79/R53.83 - 09/11/2020  · Medications  o Medications have been Reconciled  o Transition of Care or Provider Policy  · Instructions  o Patient was educated/instructed on their diagnosis, treatment and medications prior to discharge from the clinic today.  o Electronically Identified Patient Education Materials Provided Electronically  · Disposition  o Call or Return if symptoms worsen or persist.  o Follow up in 2 weeks  o Labs drawn in office today            Electronically Signed by: Emily Philip PA-C -Author on September 11, 2020 11:21:39 AM

## 2021-05-14 VITALS
BODY MASS INDEX: 20.09 KG/M2 | TEMPERATURE: 98.2 F | HEIGHT: 67 IN | DIASTOLIC BLOOD PRESSURE: 60 MMHG | SYSTOLIC BLOOD PRESSURE: 98 MMHG | OXYGEN SATURATION: 100 % | WEIGHT: 128 LBS | RESPIRATION RATE: 15 BRPM | HEART RATE: 74 BPM

## 2021-05-14 VITALS
HEIGHT: 66 IN | OXYGEN SATURATION: 100 % | HEART RATE: 99 BPM | BODY MASS INDEX: 20.61 KG/M2 | TEMPERATURE: 97 F | WEIGHT: 128.25 LBS | RESPIRATION RATE: 17 BRPM

## 2021-05-14 VITALS
TEMPERATURE: 98.1 F | SYSTOLIC BLOOD PRESSURE: 122 MMHG | BODY MASS INDEX: 19.93 KG/M2 | DIASTOLIC BLOOD PRESSURE: 70 MMHG | RESPIRATION RATE: 15 BRPM | OXYGEN SATURATION: 97 % | WEIGHT: 127 LBS | HEART RATE: 79 BPM | HEIGHT: 67 IN

## 2021-05-14 VITALS
BODY MASS INDEX: 19.99 KG/M2 | DIASTOLIC BLOOD PRESSURE: 64 MMHG | HEIGHT: 67 IN | SYSTOLIC BLOOD PRESSURE: 108 MMHG | HEART RATE: 96 BPM | TEMPERATURE: 97.4 F | OXYGEN SATURATION: 100 % | WEIGHT: 127.37 LBS

## 2021-05-14 VITALS
HEART RATE: 64 BPM | HEIGHT: 65 IN | BODY MASS INDEX: 22.16 KG/M2 | TEMPERATURE: 97.7 F | OXYGEN SATURATION: 100 % | DIASTOLIC BLOOD PRESSURE: 62 MMHG | WEIGHT: 133 LBS | SYSTOLIC BLOOD PRESSURE: 104 MMHG

## 2021-05-14 NOTE — PROGRESS NOTES
"   Progress Note      Patient Name: Audra Rendon   Patient ID: [id number]   Sex: Female   YOB: 1985    Primary Care Provider: Lori PERLA   Referring Provider: Lori PERLA    Visit Date: January 29, 2021    Provider: MINDA Martinez   Location: [facility] [place] [place] [facility]   Location Address: [address] [place] [address], [address]  [place], [place]  [id number]   Location Phone: [phone]          Chief Complaint  · Acute visit  · \"I think I may be going into pre-menopause\"  · \"I have this burning sensation\"  · \"I have been having hot flashes at night\"  · \"I've been getting breakouts on my face\"      History Of Present Illness  Audra Rendon is a 35 year old /White female who presents for evaluation and treatment of:      hysterectomy 16 months ago, ovaries still present  she is having difficulty sleeping.  Frequent hot flashes waking her at night  she is having acne breakouts, rash on face that seem to be cyclic monthly  she is having dryness and itching  she denies discharge/burning  not currently or recently sexually active  decreased libido in the last year  feeling more irritable recently despite change in medications  she will talk to psych further about medications  she reports recent psych provider told her that she did not have bipolar disorder  previously had \"brain shocks\" with Effexor         Past Medical History  Disease Name Date Onset Notes   Allergies --  --    Anemia --  --    Asthma --  --    Depression with anxiety --  --    Gall stones --  --    Head injury --  --    Hearing loss --  --    Kidney calculi --  --    Migraine --  --    Reflux --  --    Thyroid disorder --  --    Tinnitus --  --          Past Surgical History  Procedure Name Date Notes   Ablation --  --    Dilatation and curettage 2008 --    Hysterectomy --  --    Laparoscopic cholecystectomy 2007 --    Tubal ligation 2015 --    [place] Tooth Extraction --  --          Medication " "List  Name Date Started Instructions   acyclovir 400 mg oral tablet 10/14/2020 TAKE ONE TABLET BY MOUTH EVERY 8 HOURS AS NEEDED FOR COLD SORES   amitriptyline 25 mg oral tablet  take 1 tablet (25 mg) by oral route once daily at bedtime   bupropion HCl 150 mg oral tablet extended release 24 hr  --    buspirone 5 mg oral tablet  --    butalbital-acetaminophen-caff -40 mg oral tablet 10/15/2020 take 1 tablet by oral route every 4 hours as needed not to exceed 6 tablets per 24hrs   dextroamphetamine-amphetamine 10 mg oral tablet  --    ProAir HFA 90 mcg/actuation inhalation HFA aerosol inhaler 01/08/2021 inhale 1 puff (90 mcg) by inhalation route every 4-6 hours as needed   Vitamin A one daily Oral  as directed.   Vitamin D one daily oral  as directed   Vyvanse 70 mg oral capsule  take 1 capsule (70 mg) by oral route once daily in the morning         Allergy List  Allergen Name Date Reaction Notes   morphine --  --  --        Allergies Reconciled  Family Medical History  Disease Name Relative/Age Notes   Family history of cancer Aunt/  Grandfather (paternal)/   --    Family history of heart disease Grandfather (paternal)/   --          Social History  Finding Status Start/Stop Quantity Notes   Alcohol Never --/-- --  --    Tobacco Former --/-- --  --          Review of Systems  · Constitutional  o Denies  o : fever, fatigue, weight loss, weight gain  · Cardiovascular  o Denies  o : lower extremity edema, claudication, chest pressure, palpitations  · Respiratory  o Denies  o : shortness of breath, wheezing, cough, hemoptysis, dyspnea on exertion  · Gastrointestinal  o Denies  o : nausea, vomiting, diarrhea, constipation, abdominal pain      Vitals  Date Time BP Position Site L\R Cuff Size HR RR TEMP (F) WT  HT  BMI kg/m2 BSA m2 O2 Sat FR L/min FiO2 HC       09/28/2020 09:21 /70 Sitting    79 - R 15 98.1 126lbs 16oz 5'  7\" 19.89 1.65 97 %      01/08/2021 09:04 AM      99 - R 17 97 128lbs 4oz 5'  6\" 20.7 1.65 " "100 %  21%    01/29/2021 11:22 /64 Sitting    96 - R  97.4 127lbs 6oz 5'  7\" 19.95 1.65 100 %  21%          Physical Examination  · Constitutional  o Appearance  o : no acute distress, well-nourished  · Head and Face  o Head  o :   § Inspection  § : atraumatic, normocephalic  · Eyes  o Eyes  o : extraocular movements intact, no scleral icterus, no conjunctival injection  · Ears, Nose, Mouth and Throat  o Ears  o :   § External Ears  § : normal  o Nose  o :   § Intranasal Exam  § : nares patent  o Oral Cavity  o :   § Oral Mucosa  § : moist mucous membranes  · Respiratory  o Respiratory Effort  o : breathing comfortably, symmetric chest rise  o Auscultation of Lungs  o : clear to asculatation bilaterally, no wheezes, rales, or rhonchii  · Cardiovascular  o Heart  o :   § Auscultation of Heart  § : regular rate and rhythm, no murmurs, rubs, or gallops  o Peripheral Vascular System  o :   § Extremities  § : no edema  · Lymphatic  o Neck  o : no lymphadenopathy present  · Neurologic  o Mental Status Examination  o :   § Orientation  § : grossly oriented to person, place and time  o Gait and Station  o :   § Gait Screening  § : normal gait  · Psychiatric  o General  o : normal mood and affect              Assessment  · Depression with anxiety     300.4/F41.8  · Vaginal burning     625.8/N94.9  discussed possible need for vaginal estrogen which she will discuss with gyn. discussed vaginal moisturizers and reviewed recent swab results  · Hot flashes     782.62/R23.2  concerned that this is secondary to perimenopause s/p hysterectomy. she will f/u with Dr. Carolina for routine eval and discuss risk/benefits of HRT. Also discussed use of SNRIs to help with irritability, hot flashes, sleep disruption  · Acne     706.1/L70.9    Problems Reconciled  Plan  · Orders  o ACO-39: Current medications updated and reviewed (, 1159F) - - 01/29/2021  · Medications  o Medications have been Reconciled  o Transition of Care or " Provider Policy  · Instructions  o Patient was educated/instructed on their diagnosis, treatment and medications prior to discharge from the clinic today.  o Call the office with any concerns or questions.  · Disposition  o Call or Return if symptoms worsen or persist.            Electronically Signed by: MINDA Martinez -Author on January 29, 2021 01:09:12 PM

## 2021-05-14 NOTE — PROGRESS NOTES
"   Progress Note      Patient Name: Audra Rendon   Patient ID: [id number]   Sex: Female   YOB: 1985    Primary Care Provider: Lori PERLA   Referring Provider: Lori PERLA    Visit Date: April 9, 2021    Provider: MINDA Martinez   Location: [facility] [place] [facility]   Location Address: [address] [place] [address]  [id number]   Location Phone: [phone]          Chief Complaint  · Follow up   · \" Need blood work for my thyroid and iron\"      History Of Present Illness  Audra Rendon is a 35 year old /White female who presents for evaluation and treatment of:      She reports feeling better  she reports feeling tired at times but reports taking better care of herself  sleeping well  she reports missing some of her psych pills on vacation  moved to [place] last weekend  closer to school and work       Past Medical History  Disease Name Date Onset Notes   Allergies --  --    Anemia --  --    Asthma --  --    Depression with anxiety --  --    Gall stones --  --    Head injury --  --    Hearing loss --  --    Kidney calculi --  --    Migraine --  --    Reflux --  --    Thyroid disorder --  --    Tinnitus --  --          Past Surgical History  Procedure Name Date Notes   Ablation --  --    Dilatation and curettage 2008 --    Hysterectomy --  --    Laparoscopic cholecystectomy 2007 --    Tubal ligation 2015 --    [place] Tooth Extraction --  --          Medication List  Name Date Started Instructions   acyclovir 400 mg oral tablet 10/14/2020 TAKE ONE TABLET BY MOUTH EVERY 8 HOURS AS NEEDED FOR COLD SORES   bupropion HCl 150 mg oral tablet extended release 24 hr  --    buspirone 5 mg oral tablet  --    butalbital-acetaminophen-caff -40 mg oral tablet 03/01/2021 take 1 tablet by oral route every 4 hours as needed not to exceed 6 tablets per 24hrs   dextroamphetamine-amphetamine 10 mg oral tablet  --    ProAir HFA 90 mcg/actuation " "inhalation HFA aerosol inhaler 01/08/2021 inhale 1 puff (90 mcg) by inhalation route every 4-6 hours as needed   Vitamin D one daily oral  as directed   Vyvanse 70 mg oral capsule  take 1 capsule (70 mg) by oral route once daily in the morning         Allergy List  Allergen Name Date Reaction Notes   morphine --  --  --        Allergies Reconciled  Family Medical History  Disease Name Relative/Age Notes   Family history of cancer Aunt/  Grandfather (paternal)/   --    Family history of heart disease Grandfather (paternal)/   --          Social History  Finding Status Start/Stop Quantity Notes   Alcohol Never --/-- --  --    Tobacco Former --/-- --  --          Review of Systems  · Constitutional  o Admits  o : fatigue  o Denies  o : fever, weight loss, weight gain  · Cardiovascular  o Denies  o : lower extremity edema, claudication, chest pressure, palpitations  · Respiratory  o Denies  o : shortness of breath, wheezing, cough, hemoptysis, dyspnea on exertion  · Gastrointestinal  o Denies  o : nausea, vomiting, diarrhea, constipation, abdominal pain      Vitals  Date Time BP Position Site L\R Cuff Size HR RR TEMP (F) WT  HT  BMI kg/m2 BSA m2 O2 Sat FR L/min FiO2 HC       09/28/2020 09:21 /70 Sitting    79 - R 15 98.1 126lbs 16oz 5'  7\" 19.89 1.65 97 %      01/08/2021 09:04 AM      99 - R 17 97 128lbs 4oz 5'  6\" 20.7 1.65 100 %  21%    01/29/2021 11:22 /64 Sitting    96 - R  97.4 127lbs 6oz 5'  7\" 19.95 1.65 100 %  21%    04/09/2021 08:32 /62 Sitting    64 - R  97.7 133lbs 0oz 5'  5\" 22.13 1.66 100 %  21%          Physical Examination  · Constitutional  o Appearance  o : no acute distress, well-nourished  · Head and Face  o Head  o :   § Inspection  § : atraumatic, normocephalic  · Eyes  o Eyes  o : extraocular movements intact, no scleral icterus, no conjunctival injection  · Ears, Nose, Mouth and Throat  o Ears  o :   § External Ears  § : normal  o Nose  o :   § Intranasal Exam  § : nares " patent  o Oral Cavity  o :   § Oral Mucosa  § : moist mucous membranes  · Neck  o Thyroid  o : gland size normal, nontender, no nodules or masses present on palpation, symmetric  · Respiratory  o Respiratory Effort  o : breathing comfortably, symmetric chest rise  o Auscultation of Lungs  o : clear to asculatation bilaterally, no wheezes, rales, or rhonchii  · Cardiovascular  o Heart  o :   § Auscultation of Heart  § : regular rate and rhythm, no murmurs, rubs, or gallops  o Peripheral Vascular System  o :   § Extremities  § : no edema  · Neurologic  o Mental Status Examination  o :   § Orientation  § : grossly oriented to person, place and time  o Gait and Station  o :   § Gait Screening  § : normal gait  · Psychiatric  o General  o : normal mood and affect          Assessment  · Fatigue     780.79/R53.83  will check labs today and adjust medications as needed. discussed healthy lifestyle habits  · Vitamin D deficiency     268.9/E55.9    Problems Reconciled  Plan  · Orders  o Female Fatigue Panel (CBC, CMP, TSH, B12) [facility] (64137, 60193, 19077, 07643) - 780.79/R53.83 - 04/09/2021  o Iron panel (iron, TIBC, transferrin saturation) (36742, 36765, 74518) - 780.79/R53.83 - 04/09/2021  o Vitamin D Level (50532) - 268.9/E55.9 - 04/09/2021  o ACO-39: Current medications updated and reviewed (1159F, ) - - 04/09/2021  o Ferritin (65387) - - 04/09/2021  · Medications  o Medications have been Reconciled  o Transition of Care or Provider Policy  · Instructions  o Patient was educated/instructed on their diagnosis, treatment and medications prior to discharge from the clinic today.  o Call the office with any concerns or questions.  · Disposition  o Call or Return if symptoms worsen or persist.  o Follow up in 6 months  o labs drawn in house today            Electronically Signed by: MINDA Martinez -Author on April 9, 2021 08:51:37 AM

## 2021-05-14 NOTE — PROGRESS NOTES
Progress Note      Patient Name: Audra Rendon   Patient ID: [id number]   Sex: Female   YOB: 1985    Primary Care Provider: Lori PERLA   Referring Provider: Lori PERLA    Visit Date: January 8, 2021    Provider: MINDA Martinez   Location: [facility]   Location Address: [address] [place] [address], [address]  [place], [place]  [id number]   Location Phone: [phone]          Chief Complaint  · Follow-up      History Of Present Illness  Audra Rendon is a 35 year old /White female who presents for evaluation and treatment of:      labs reviewed    doing well  in nursing school  Working part-time    She reports doing well with healthy eating habits at this time.  She states that for period of time she was probably not eating enough and skipping meals due to staying so busy.  She reports that she is now improved that balance and is eating several small meals throughout the day.    Migraines-Fioricet helping when she takes this.  She reports that she is not having to take this frequently.       Past Medical History  Disease Name Date Onset Notes   Allergies --  --    Anemia --  --    Asthma --  --    Depression with anxiety --  --    Gall stones --  --    Head injury --  --    Hearing loss --  --    Kidney calculi --  --    Migraine --  --    Reflux --  --    Thyroid disorder --  --    Tinnitus --  --          Past Surgical History  Procedure Name Date Notes   Ablation --  --    Dilatation and curettage 2008 --    Hysterectomy --  --    Laparoscopic cholecystectomy 2007 --    Tubal ligation 2015 --    [place] Tooth Extraction --  --          Medication List  Name Date Started Instructions   acyclovir 400 mg oral tablet 10/14/2020 TAKE ONE TABLET BY MOUTH EVERY 8 HOURS AS NEEDED FOR COLD SORES   amitriptyline 25 mg oral tablet  take 1 tablet (25 mg) by oral route once daily at bedtime   bupropion HCl 150 mg oral tablet extended release 24 hr  --   "  buspirone 5 mg oral tablet  --    butalbital-acetaminophen-caff -40 mg oral tablet 10/15/2020 take 1 tablet by oral route every 4 hours as needed not to exceed 6 tablets per 24hrs   dextroamphetamine-amphetamine 10 mg oral tablet  --    ProAir HFA 90 mcg/actuation inhalation HFA aerosol inhaler 01/08/2021 inhale 1 puff (90 mcg) by inhalation route every 4-6 hours as needed   Vitamin A one daily Oral  as directed.   Vitamin D one daily oral  as directed   Vyvanse 70 mg oral capsule  take 1 capsule (70 mg) by oral route once daily in the morning         Allergy List  Allergen Name Date Reaction Notes   morphine --  --  --          Family Medical History  Disease Name Relative/Age Notes   Family history of cancer Aunt/  Grandfather (paternal)/   --    Family history of heart disease Grandfather (paternal)/   --          Social History  Finding Status Start/Stop Quantity Notes   Alcohol Never --/-- --  --    Tobacco Former --/-- --  --          Review of Systems  · Constitutional  o Denies  o : fever, fatigue, weight loss, weight gain  · Cardiovascular  o Denies  o : lower extremity edema, claudication, chest pressure, palpitations  · Respiratory  o Denies  o : shortness of breath, wheezing, cough, hemoptysis, dyspnea on exertion  · Gastrointestinal  o Denies  o : nausea, vomiting, diarrhea, constipation, abdominal pain      Vitals  Date Time BP Position Site L\R Cuff Size HR RR TEMP (F) WT  HT  BMI kg/m2 BSA m2 O2 Sat FR L/min FiO2 HC       09/11/2020 08:09 AM 98/60 Sitting    74 - R 15 98.2 128lbs 0oz 5'  7\" 20.05 1.66 100 %      09/28/2020 09:21 /70 Sitting    79 - R 15 98.1 126lbs 16oz 5'  7\" 19.89 1.65 97 %      01/08/2021 09:04 AM      99 - R 17 97 128lbs 4oz 5'  6\" 20.7 1.65 100 %  21%          Physical Examination  · Constitutional  o Appearance  o : no acute distress, well-nourished  · Head and Face  o Head  o :   § Inspection  § : atraumatic, normocephalic  · Eyes  o Eyes  o : extraocular " movements intact, no scleral icterus, no conjunctival injection  · Ears, Nose, Mouth and Throat  o Ears  o :   § External Ears  § : normal  o Nose  o :   § Intranasal Exam  § : nares patent  o Oral Cavity  o :   § Oral Mucosa  § : moist mucous membranes  · Respiratory  o Respiratory Effort  o : breathing comfortably, symmetric chest rise  o Auscultation of Lungs  o : clear to asculatation bilaterally, no wheezes, rales, or rhonchii  · Cardiovascular  o Heart  o :   § Auscultation of Heart  § : regular rate and rhythm, no murmurs, rubs, or gallops  o Peripheral Vascular System  o :   § Extremities  § : no edema  · Neurologic  o Mental Status Examination  o :   § Orientation  § : grossly oriented to person, place and time  o Gait and Station  o :   § Gait Screening  § : normal gait  · Psychiatric  o General  o : normal mood and affect          Assessment  · Depression with anxiety     300.4/F41.8  Bipolar and ADHD managed well by psych  · Migraine     346.90/G43.909  · Asthma     493.90/J45.909  Asthma well controlled. She is not frequently using albuterol inhaler.  · Screening for depression     V79.0/Z13.89    Problems Reconciled  Plan  · Orders  o ACO-39: Current medications updated and reviewed (, 1159F) - - 01/08/2021  · Medications  o ProAir HFA 90 mcg/actuation inhalation HFA aerosol inhaler   SIG: inhale 1 puff (90 mcg) by inhalation route every 4-6 hours as needed   DISP: (1) Inhaler with 1 refills  Adjusted on 01/08/2021     o Medications have been Reconciled  o Transition of Care or Provider Policy  · Instructions  o Depression Screen completed and scanned into the EMR under the designated folder within the patient's documents.  o Today's PHQ-9 result is _9__  o Patient was educated/instructed on their diagnosis, treatment and medications prior to discharge from the clinic today.  o Call the office with any concerns or questions.  · Disposition  o Call or Return if symptoms worsen or persist.  o Follow  up in 3 months  o Prescriptions sent electronically            Electronically Signed by: Lori Norman APRN -Author on January 8, 2021 05:50:35 PM

## 2021-05-15 VITALS
OXYGEN SATURATION: 100 % | HEIGHT: 67 IN | BODY MASS INDEX: 21.74 KG/M2 | WEIGHT: 138.5 LBS | SYSTOLIC BLOOD PRESSURE: 108 MMHG | RESPIRATION RATE: 12 BRPM | DIASTOLIC BLOOD PRESSURE: 78 MMHG | HEART RATE: 75 BPM | TEMPERATURE: 98.1 F

## 2021-05-15 VITALS
OXYGEN SATURATION: 98 % | HEIGHT: 67 IN | TEMPERATURE: 98.3 F | HEART RATE: 60 BPM | BODY MASS INDEX: 20.88 KG/M2 | RESPIRATION RATE: 18 BRPM | SYSTOLIC BLOOD PRESSURE: 118 MMHG | WEIGHT: 133 LBS | DIASTOLIC BLOOD PRESSURE: 82 MMHG

## 2021-05-15 VITALS
HEIGHT: 65 IN | TEMPERATURE: 97.9 F | BODY MASS INDEX: 22.85 KG/M2 | OXYGEN SATURATION: 100 % | HEART RATE: 79 BPM | DIASTOLIC BLOOD PRESSURE: 70 MMHG | WEIGHT: 137.12 LBS | SYSTOLIC BLOOD PRESSURE: 127 MMHG | RESPIRATION RATE: 16 BRPM

## 2021-05-15 VITALS
RESPIRATION RATE: 15 BRPM | WEIGHT: 133 LBS | HEART RATE: 83 BPM | SYSTOLIC BLOOD PRESSURE: 110 MMHG | HEIGHT: 67 IN | OXYGEN SATURATION: 100 % | DIASTOLIC BLOOD PRESSURE: 88 MMHG | TEMPERATURE: 98 F | BODY MASS INDEX: 20.88 KG/M2

## 2021-05-15 VITALS
HEIGHT: 67 IN | WEIGHT: 157.25 LBS | RESPIRATION RATE: 15 BRPM | BODY MASS INDEX: 24.68 KG/M2 | OXYGEN SATURATION: 100 % | SYSTOLIC BLOOD PRESSURE: 114 MMHG | TEMPERATURE: 97.5 F | DIASTOLIC BLOOD PRESSURE: 78 MMHG | HEART RATE: 80 BPM

## 2021-05-15 VITALS
RESPIRATION RATE: 14 BRPM | BODY MASS INDEX: 21.35 KG/M2 | OXYGEN SATURATION: 98 % | DIASTOLIC BLOOD PRESSURE: 86 MMHG | WEIGHT: 136 LBS | TEMPERATURE: 98.2 F | SYSTOLIC BLOOD PRESSURE: 104 MMHG | HEIGHT: 67 IN | HEART RATE: 86 BPM

## 2021-05-15 VITALS
OXYGEN SATURATION: 100 % | WEIGHT: 138 LBS | SYSTOLIC BLOOD PRESSURE: 108 MMHG | BODY MASS INDEX: 23.32 KG/M2 | DIASTOLIC BLOOD PRESSURE: 72 MMHG | RESPIRATION RATE: 15 BRPM | HEART RATE: 93 BPM | TEMPERATURE: 97.7 F

## 2021-05-15 VITALS
DIASTOLIC BLOOD PRESSURE: 68 MMHG | SYSTOLIC BLOOD PRESSURE: 106 MMHG | BODY MASS INDEX: 24.19 KG/M2 | WEIGHT: 154.12 LBS | OXYGEN SATURATION: 98 % | TEMPERATURE: 97.9 F | HEART RATE: 75 BPM | RESPIRATION RATE: 12 BRPM | HEIGHT: 67 IN

## 2021-05-15 VITALS
BODY MASS INDEX: 20.46 KG/M2 | HEART RATE: 70 BPM | TEMPERATURE: 98.3 F | WEIGHT: 130.37 LBS | HEIGHT: 67 IN | OXYGEN SATURATION: 100 % | DIASTOLIC BLOOD PRESSURE: 68 MMHG | SYSTOLIC BLOOD PRESSURE: 120 MMHG

## 2021-05-15 VITALS
DIASTOLIC BLOOD PRESSURE: 80 MMHG | WEIGHT: 156.25 LBS | TEMPERATURE: 97.6 F | HEIGHT: 67 IN | HEART RATE: 79 BPM | SYSTOLIC BLOOD PRESSURE: 112 MMHG | OXYGEN SATURATION: 100 % | BODY MASS INDEX: 24.52 KG/M2

## 2021-05-16 VITALS
DIASTOLIC BLOOD PRESSURE: 62 MMHG | OXYGEN SATURATION: 100 % | RESPIRATION RATE: 14 BRPM | HEIGHT: 67 IN | WEIGHT: 154.37 LBS | TEMPERATURE: 97.8 F | BODY MASS INDEX: 24.23 KG/M2 | SYSTOLIC BLOOD PRESSURE: 108 MMHG | HEART RATE: 90 BPM

## 2021-05-16 VITALS
OXYGEN SATURATION: 99 % | TEMPERATURE: 97.6 F | SYSTOLIC BLOOD PRESSURE: 100 MMHG | BODY MASS INDEX: 23.15 KG/M2 | RESPIRATION RATE: 15 BRPM | DIASTOLIC BLOOD PRESSURE: 64 MMHG | WEIGHT: 147.5 LBS | HEIGHT: 67 IN | HEART RATE: 66 BPM

## 2021-05-16 VITALS
SYSTOLIC BLOOD PRESSURE: 122 MMHG | WEIGHT: 147.5 LBS | HEART RATE: 76 BPM | BODY MASS INDEX: 23.15 KG/M2 | DIASTOLIC BLOOD PRESSURE: 78 MMHG | RESPIRATION RATE: 20 BRPM | TEMPERATURE: 98.2 F | OXYGEN SATURATION: 96 % | HEIGHT: 67 IN

## 2021-05-16 VITALS
BODY MASS INDEX: 25.9 KG/M2 | HEART RATE: 87 BPM | WEIGHT: 165 LBS | SYSTOLIC BLOOD PRESSURE: 128 MMHG | TEMPERATURE: 98.3 F | HEIGHT: 67 IN | DIASTOLIC BLOOD PRESSURE: 74 MMHG | OXYGEN SATURATION: 99 %

## 2021-05-16 VITALS
HEIGHT: 67 IN | BODY MASS INDEX: 23.39 KG/M2 | RESPIRATION RATE: 15 BRPM | DIASTOLIC BLOOD PRESSURE: 69 MMHG | SYSTOLIC BLOOD PRESSURE: 110 MMHG | WEIGHT: 149 LBS | TEMPERATURE: 97.5 F | OXYGEN SATURATION: 99 % | HEART RATE: 66 BPM

## 2021-05-16 VITALS
SYSTOLIC BLOOD PRESSURE: 100 MMHG | TEMPERATURE: 97 F | HEIGHT: 67 IN | BODY MASS INDEX: 23.25 KG/M2 | HEART RATE: 93 BPM | RESPIRATION RATE: 16 BRPM | OXYGEN SATURATION: 100 % | WEIGHT: 148.12 LBS | DIASTOLIC BLOOD PRESSURE: 68 MMHG

## 2021-05-16 VITALS
HEART RATE: 71 BPM | WEIGHT: 158 LBS | HEIGHT: 67 IN | TEMPERATURE: 97.7 F | DIASTOLIC BLOOD PRESSURE: 66 MMHG | SYSTOLIC BLOOD PRESSURE: 108 MMHG | RESPIRATION RATE: 15 BRPM | OXYGEN SATURATION: 100 % | BODY MASS INDEX: 24.8 KG/M2

## 2021-05-16 VITALS
HEIGHT: 67 IN | DIASTOLIC BLOOD PRESSURE: 72 MMHG | TEMPERATURE: 97.7 F | RESPIRATION RATE: 14 BRPM | OXYGEN SATURATION: 96 % | BODY MASS INDEX: 25.58 KG/M2 | HEART RATE: 78 BPM | WEIGHT: 163 LBS | SYSTOLIC BLOOD PRESSURE: 98 MMHG

## 2021-05-16 VITALS
HEART RATE: 78 BPM | TEMPERATURE: 98.7 F | OXYGEN SATURATION: 99 % | BODY MASS INDEX: 25.58 KG/M2 | WEIGHT: 163 LBS | SYSTOLIC BLOOD PRESSURE: 132 MMHG | HEIGHT: 67 IN | DIASTOLIC BLOOD PRESSURE: 90 MMHG

## 2021-08-04 ENCOUNTER — TELEPHONE (OUTPATIENT)
Dept: INTERNAL MEDICINE | Facility: CLINIC | Age: 36
End: 2021-08-04

## 2021-08-04 NOTE — TELEPHONE ENCOUNTER
Caller: Audra Rendon    Relationship to patient: Self    Best call back number: [phone]    Chief complaint: TESTED POSITIVE COVID 11 DAYS AGO AND NEEDS A FOLLOW UP FOR HER EMPLOYER    Type of visit:OFFICE VISIT     Requested date: ASAP         Additional notes: PATIENT ALSO HAS 3 DAUGHTERS THAT NEED APPOINTMENTS TO BE SEEN FOR WELL CHILD VISITS FOR A 12 YEAR OLD, 14 YEAR OLD, AND 17 YEAR OLD   ALL THREE ARE [employer] [facility] PATIENTS AS WELL.  MOTHER STATES THAT IT CAN BE WITH ANOTHER APC IF UNABLE TO SCHEDULE WITH [facility].     [facility] WAS UNABLE TO SCHEDULE DUE TO NO AVAILABILITY.

## 2021-08-17 ENCOUNTER — TELEPHONE (OUTPATIENT)
Dept: INTERNAL MEDICINE | Facility: CLINIC | Age: 36
End: 2021-08-17

## 2021-08-17 NOTE — TELEPHONE ENCOUNTER
Patient called back and she is going to the ED to have right side abdominal pain assessed. It was the right side not the left side.  Patient also said she had covid about 3 weeks ago and has sx improvement and feeling ok besides right sided pain that hurts to the touch. She needs a note for work to go back. Does she need to be seen or can we give a note stating that she is ok to go back after 14 day quarantine and symptom improvement and fever free after 72 hours? Please advise.     Lori Norman  Patient.

## 2021-08-17 NOTE — TELEPHONE ENCOUNTER
Task was given from  pool about patient having lower left abdominal pain and painful to touch and increase pain with walking. . Has had it for 2 days and getting concerned.  Attempted to call patient back to advise them to be seen in the ED to see what is going on. Patient didn't answer phone, left message to call back.

## 2021-08-19 NOTE — TELEPHONE ENCOUNTER
It is probably fine to draft a note for the Covid however I do not feel comfortable doing that until we know what is going on with the abdominal pain please check in with her again

## 2021-08-23 NOTE — TELEPHONE ENCOUNTER
Dr. Hakeem hurst    Patient stomach is completely resolved. Patient states that this is likely due to constipation and was much better after large BM. Note for work is no longer needed.

## 2021-09-08 RX ORDER — BUTALBITAL, ACETAMINOPHEN AND CAFFEINE 50; 325; 40 MG/1; MG/1; MG/1
1 TABLET ORAL EVERY 4 HOURS PRN
Qty: 30 TABLET | Refills: 0 | Status: SHIPPED | OUTPATIENT
Start: 2021-09-08 | End: 2022-01-11 | Stop reason: SDUPTHER

## 2021-09-08 NOTE — TELEPHONE ENCOUNTER
Refill request for  controlled substance.      Date of request: 9/8/2021  (Please change date if request date is different than today's)  Medication requested: Fioricet  Last OV: 4/9/21  Last UDS: 3/5/2021  Contract signed: yes    Date:10/26/2020  Next office visit: 10/11/2021    Susanna Keane

## 2021-09-08 NOTE — TELEPHONE ENCOUNTER
Caller: Rendon Audra LYRIC    Relationship: Self    Best call back number: [phone]    Medication needed:   Requested Prescriptions     Pending Prescriptions Disp Refills   • butalbital-acetaminophen-caffeine (Fioricet) -40 MG per tablet       Sig: Take 1 tablet by mouth Every 4 (Four) Hours As Needed for Headache.       When do you need the refill by: OUT FOR A WEEK        Does the patient have less than a 3 day supply:  [x] Yes  [] No    What is the patient's preferred pharmacy: [facility] [address] [address] - [phone] [facility] [phone] FX

## 2021-09-21 ENCOUNTER — OFFICE VISIT (OUTPATIENT)
Dept: INTERNAL MEDICINE | Facility: CLINIC | Age: 36
End: 2021-09-21

## 2021-09-21 VITALS
WEIGHT: 129.2 LBS | SYSTOLIC BLOOD PRESSURE: 118 MMHG | HEART RATE: 120 BPM | TEMPERATURE: 98.3 F | BODY MASS INDEX: 21.52 KG/M2 | OXYGEN SATURATION: 99 % | DIASTOLIC BLOOD PRESSURE: 70 MMHG | HEIGHT: 65 IN

## 2021-09-21 DIAGNOSIS — B37.0 THRUSH: Primary | ICD-10-CM

## 2021-09-21 PROCEDURE — 99213 OFFICE O/P EST LOW 20 MIN: CPT | Performed by: NURSE PRACTITIONER

## 2021-09-21 RX ORDER — BUPROPION HYDROCHLORIDE 150 MG/1
TABLET ORAL
COMMUNITY
Start: 2021-07-08 | End: 2023-03-10

## 2021-09-21 RX ORDER — LISDEXAMFETAMINE DIMESYLATE 70 MG/1
CAPSULE ORAL
COMMUNITY
Start: 2021-07-12 | End: 2023-01-13

## 2021-09-21 RX ORDER — BUSPIRONE HYDROCHLORIDE 5 MG/1
TABLET ORAL
COMMUNITY
End: 2021-10-11

## 2021-09-21 RX ORDER — DEXTROAMPHETAMINE SACCHARATE, AMPHETAMINE ASPARTATE, DEXTROAMPHETAMINE SULFATE AND AMPHETAMINE SULFATE 2.5; 2.5; 2.5; 2.5 MG/1; MG/1; MG/1; MG/1
TABLET ORAL
COMMUNITY
End: 2022-01-01

## 2021-09-21 NOTE — PATIENT INSTRUCTIONS
You are being treated for thrush today.  We have sent in nystatin swish and spit to use 4 times daily for at least 10 days.  If your symptoms worsen or are not getting better please return to clinic.

## 2021-09-21 NOTE — PROGRESS NOTES
"Chief Complaint  \"I think i have thrush on my tounge.\"    Subjective          Audra Rendon presents to [facility]  History of Present Illness patient presents to clinic today with report of pain on her tongue with some sort of rash.  Patient recently had braces installed and has noticed that her tongue is rubbing against the braces and has caused some trauma.  However yesterday she noticed more of a rash on her tongue more towards the base.  Has had thrush in the past and is concerned that this is what it is again today.    Objective   Vital Signs:   /70   Pulse 120   Temp 98.3 °F (36.8 °C)   Ht 165.1 cm (65\")   Wt 58.6 kg (129 lb 3.2 oz)   SpO2 99%   BMI 21.50 kg/m²     Physical Exam  Vitals and nursing note reviewed.   Constitutional:       General: She is not in acute distress.     Appearance: Normal appearance. She is not ill-appearing, toxic-appearing or diaphoretic.   HENT:      Head: Normocephalic and atraumatic.      Nose: Nose normal. No congestion or rhinorrhea.      Mouth/Throat:      Pharynx: No oropharyngeal exudate or posterior oropharyngeal erythema.      Comments: There are some small abrasions to the bilateral sides of the tongue closer to the base.  There is white patches on the tongue more on the base.  Throat is clear.  Eyes:      Conjunctiva/sclera: Conjunctivae normal.   Pulmonary:      Effort: Pulmonary effort is normal. No respiratory distress.      Breath sounds: No stridor. No wheezing, rhonchi or rales.   Musculoskeletal:      Cervical back: No rigidity.   Lymphadenopathy:      Cervical: No cervical adenopathy.   Skin:     General: Skin is warm and dry.   Neurological:      Mental Status: She is alert and oriented to person, place, and time.   Psychiatric:         Mood and Affect: Mood normal.         Behavior: Behavior normal.        Result Review :                 Assessment and Plan    Diagnoses and all orders for this " visit:    1. Thrush (Primary)  Assessment & Plan:  Patient with apparent thrush.  Patient will be treated with nystatin swish and spit.  Advised to return to clinic if symptoms do not resolve or worsen.    Orders:  -     nystatin (MYCOSTATIN) 472320 UNIT/ML suspension; Swish and swallow 5 mL 4 (Four) Times a Day.  Dispense: 473 mL; Refill: 0      Follow Up   Return if symptoms worsen or fail to improve.  Patient was given instructions and counseling regarding her condition or for health maintenance advice. Please see specific information pulled into the AVS if appropriate.

## 2021-09-21 NOTE — ASSESSMENT & PLAN NOTE
Patient with apparent thrush.  Patient will be treated with nystatin swish and spit.  Advised to return to clinic if symptoms do not resolve or worsen.

## 2021-10-11 ENCOUNTER — OFFICE VISIT (OUTPATIENT)
Dept: INTERNAL MEDICINE | Facility: CLINIC | Age: 36
End: 2021-10-11

## 2021-10-11 VITALS
SYSTOLIC BLOOD PRESSURE: 120 MMHG | WEIGHT: 130 LBS | HEART RATE: 65 BPM | DIASTOLIC BLOOD PRESSURE: 72 MMHG | HEIGHT: 65 IN | TEMPERATURE: 97.8 F | RESPIRATION RATE: 16 BRPM | OXYGEN SATURATION: 100 % | BODY MASS INDEX: 21.66 KG/M2

## 2021-10-11 DIAGNOSIS — R53.83 FATIGUE, UNSPECIFIED TYPE: ICD-10-CM

## 2021-10-11 DIAGNOSIS — M85.38 OSTEITIS CONDENSANS ILII: ICD-10-CM

## 2021-10-11 DIAGNOSIS — Z80.0 FAMILY HX OF COLON CANCER REQUIRING SCREENING COLONOSCOPY: ICD-10-CM

## 2021-10-11 DIAGNOSIS — L65.9 THINNING HAIR: ICD-10-CM

## 2021-10-11 DIAGNOSIS — R10.2 PELVIC PAIN: ICD-10-CM

## 2021-10-11 DIAGNOSIS — J30.2 SEASONAL ALLERGIES: Chronic | ICD-10-CM

## 2021-10-11 DIAGNOSIS — E55.9 VITAMIN D DEFICIENCY: ICD-10-CM

## 2021-10-11 DIAGNOSIS — E07.9 THYROID DISORDER: Primary | ICD-10-CM

## 2021-10-11 DIAGNOSIS — D64.9 ANEMIA, UNSPECIFIED TYPE: ICD-10-CM

## 2021-10-11 DIAGNOSIS — G43.709 CHRONIC MIGRAINE WITHOUT AURA WITHOUT STATUS MIGRAINOSUS, NOT INTRACTABLE: ICD-10-CM

## 2021-10-11 PROBLEM — J01.80 OTHER ACUTE SINUSITIS: Status: ACTIVE | Noted: 2021-10-11

## 2021-10-11 PROBLEM — F41.8 DEPRESSION WITH ANXIETY: Status: ACTIVE | Noted: 2021-10-11

## 2021-10-11 PROBLEM — J01.80 OTHER ACUTE SINUSITIS: Status: RESOLVED | Noted: 2021-10-11 | Resolved: 2021-10-11

## 2021-10-11 PROBLEM — F41.8 DEPRESSION WITH ANXIETY: Chronic | Status: ACTIVE | Noted: 2021-10-11

## 2021-10-11 PROBLEM — G43.909 MIGRAINE: Status: ACTIVE | Noted: 2021-10-11

## 2021-10-11 PROBLEM — J45.909 ASTHMA: Status: ACTIVE | Noted: 2021-10-11

## 2021-10-11 LAB
25(OH)D3 SERPL-MCNC: 32 NG/ML
ALBUMIN SERPL-MCNC: 5 G/DL (ref 3.5–5.2)
ALBUMIN/GLOB SERPL: 2.6 G/DL
ALP SERPL-CCNC: 59 U/L (ref 39–117)
ALT SERPL W P-5'-P-CCNC: 15 U/L (ref 1–33)
ANION GAP SERPL CALCULATED.3IONS-SCNC: 12.7 MMOL/L (ref 5–15)
AST SERPL-CCNC: 20 U/L (ref 1–32)
BASOPHILS # BLD AUTO: 0.03 10*3/MM3 (ref 0–0.2)
BASOPHILS NFR BLD AUTO: 0.5 % (ref 0–1.5)
BILIRUB SERPL-MCNC: 0.4 MG/DL (ref 0–1.2)
BUN SERPL-MCNC: 9 MG/DL (ref 6–20)
BUN/CREAT SERPL: 10.6 (ref 7–25)
CALCIUM SPEC-SCNC: 9.1 MG/DL (ref 8.6–10.5)
CHLORIDE SERPL-SCNC: 102 MMOL/L (ref 98–107)
CO2 SERPL-SCNC: 26.3 MMOL/L (ref 22–29)
CREAT SERPL-MCNC: 0.85 MG/DL (ref 0.57–1)
DEPRECATED RDW RBC AUTO: 38.4 FL (ref 37–54)
EOSINOPHIL # BLD AUTO: 0.08 10*3/MM3 (ref 0–0.4)
EOSINOPHIL NFR BLD AUTO: 1.4 % (ref 0.3–6.2)
ERYTHROCYTE [DISTWIDTH] IN BLOOD BY AUTOMATED COUNT: 12 % (ref 12.3–15.4)
FOLATE SERPL-MCNC: >20 NG/ML (ref 4.78–24.2)
GFR SERPL CREATININE-BSD FRML MDRD: 75 ML/MIN/1.73
GLOBULIN UR ELPH-MCNC: 1.9 GM/DL
GLUCOSE SERPL-MCNC: 83 MG/DL (ref 65–99)
HCT VFR BLD AUTO: 43.2 % (ref 34–46.6)
HGB BLD-MCNC: 14.2 G/DL (ref 12–15.9)
IMM GRANULOCYTES # BLD AUTO: 0.01 10*3/MM3 (ref 0–0.05)
IMM GRANULOCYTES NFR BLD AUTO: 0.2 % (ref 0–0.5)
IRON 24H UR-MRATE: 58 MCG/DL (ref 37–145)
IRON SATN MFR SERPL: 14 % (ref 20–50)
LYMPHOCYTES # BLD AUTO: 1.41 10*3/MM3 (ref 0.7–3.1)
LYMPHOCYTES NFR BLD AUTO: 24.8 % (ref 19.6–45.3)
MCH RBC QN AUTO: 28.7 PG (ref 26.6–33)
MCHC RBC AUTO-ENTMCNC: 32.9 G/DL (ref 31.5–35.7)
MCV RBC AUTO: 87.4 FL (ref 79–97)
MONOCYTES # BLD AUTO: 0.3 10*3/MM3 (ref 0.1–0.9)
MONOCYTES NFR BLD AUTO: 5.3 % (ref 5–12)
NEUTROPHILS NFR BLD AUTO: 3.86 10*3/MM3 (ref 1.7–7)
NEUTROPHILS NFR BLD AUTO: 67.8 % (ref 42.7–76)
NRBC BLD AUTO-RTO: 0.2 /100 WBC (ref 0–0.2)
PLATELET # BLD AUTO: 302 10*3/MM3 (ref 140–450)
PMV BLD AUTO: 10.2 FL (ref 6–12)
POTASSIUM SERPL-SCNC: 4.6 MMOL/L (ref 3.5–5.2)
PROT SERPL-MCNC: 6.9 G/DL (ref 6–8.5)
RBC # BLD AUTO: 4.94 10*6/MM3 (ref 3.77–5.28)
SODIUM SERPL-SCNC: 141 MMOL/L (ref 136–145)
T4 FREE SERPL-MCNC: 1.27 NG/DL (ref 0.93–1.7)
TIBC SERPL-MCNC: 410 MCG/DL (ref 298–536)
TRANSFERRIN SERPL-MCNC: 275 MG/DL (ref 200–360)
TSH SERPL DL<=0.05 MIU/L-ACNC: 1.59 UIU/ML (ref 0.27–4.2)
VIT B12 BLD-MCNC: 452 PG/ML (ref 211–946)
WBC # BLD AUTO: 5.69 10*3/MM3 (ref 3.4–10.8)

## 2021-10-11 PROCEDURE — 82746 ASSAY OF FOLIC ACID SERUM: CPT | Performed by: NURSE PRACTITIONER

## 2021-10-11 PROCEDURE — 84443 ASSAY THYROID STIM HORMONE: CPT | Performed by: NURSE PRACTITIONER

## 2021-10-11 PROCEDURE — 82306 VITAMIN D 25 HYDROXY: CPT | Performed by: NURSE PRACTITIONER

## 2021-10-11 PROCEDURE — 80053 COMPREHEN METABOLIC PANEL: CPT | Performed by: NURSE PRACTITIONER

## 2021-10-11 PROCEDURE — 99214 OFFICE O/P EST MOD 30 MIN: CPT | Performed by: NURSE PRACTITIONER

## 2021-10-11 PROCEDURE — 84439 ASSAY OF FREE THYROXINE: CPT | Performed by: NURSE PRACTITIONER

## 2021-10-11 PROCEDURE — 83540 ASSAY OF IRON: CPT | Performed by: NURSE PRACTITIONER

## 2021-10-11 PROCEDURE — 84466 ASSAY OF TRANSFERRIN: CPT | Performed by: NURSE PRACTITIONER

## 2021-10-11 PROCEDURE — 85025 COMPLETE CBC W/AUTO DIFF WBC: CPT | Performed by: NURSE PRACTITIONER

## 2021-10-11 PROCEDURE — 82607 VITAMIN B-12: CPT | Performed by: NURSE PRACTITIONER

## 2021-10-11 RX ORDER — MULTIPLE VITAMINS W/ MINERALS TAB 9MG-400MCG
1 TAB ORAL DAILY
COMMUNITY
End: 2023-01-13

## 2021-10-11 RX ORDER — BUPROPION HYDROCHLORIDE 75 MG/1
75 TABLET ORAL DAILY
COMMUNITY
Start: 2021-09-29 | End: 2023-01-13

## 2021-10-11 RX ORDER — ALBUTEROL SULFATE 90 UG/1
2 AEROSOL, METERED RESPIRATORY (INHALATION) EVERY 4 HOURS PRN
Qty: 11 G | Refills: 2 | Status: SHIPPED | OUTPATIENT
Start: 2021-10-11 | End: 2023-01-13 | Stop reason: SDUPTHER

## 2021-10-11 RX ORDER — BUSPIRONE HYDROCHLORIDE 15 MG/1
15 TABLET ORAL 3 TIMES DAILY
COMMUNITY
Start: 2021-09-29

## 2021-10-11 NOTE — PROGRESS NOTES
"Chief Complaint  Anemia, Allergies, Hypothyroidism, and Follow-up    Subjective          Audra Rendon presents to [facility]  History of Present Illness  Having a lot of hip/pelvic pain. Xray back in feb  She has been to the chiropractor  Pain worse with walking. Not able to run anymore  Hair thinning worsening  Feeling fatigued all of the time  Migraines-well controlled with fioricet, not taking fioricet frequently  Objective   Vital Signs:   /72 (BP Location: Left arm, Patient Position: Sitting, Cuff Size: Adult)   Pulse 65   Temp 97.8 °F (36.6 °C) (Temporal)   Resp 16   Ht 165.1 cm (65\")   Wt 59 kg (130 lb)   SpO2 100%   BMI 21.63 kg/m²     Physical Exam  Vitals and nursing note reviewed.   Constitutional:       General: She is not in acute distress.     Appearance: Normal appearance.   HENT:      Head: Normocephalic and atraumatic.      Right Ear: External ear normal.      Left Ear: External ear normal.      Nose: Nose normal.      Mouth/Throat:      Mouth: Mucous membranes are moist.   Eyes:      Conjunctiva/sclera: Conjunctivae normal.   Cardiovascular:      Rate and Rhythm: Normal rate and regular rhythm.      Pulses: Normal pulses.      Heart sounds: Normal heart sounds. No murmur heard.  No friction rub. No gallop.    Pulmonary:      Effort: Pulmonary effort is normal. No respiratory distress.      Breath sounds: No wheezing, rhonchi or rales.   Musculoskeletal:      Cervical back: Neck supple.   Skin:     General: Skin is warm and dry.   Neurological:      General: No focal deficit present.      Mental Status: She is alert and oriented to person, place, and time.   Psychiatric:         Mood and Affect: Mood normal.         Behavior: Behavior normal.        Result Review :       Data reviewed: Radiologic studies hip/pelvis, lumbar spine xrays   Procedures      Assessment and Plan    Diagnoses and all orders for this visit:    1. Thyroid " disorder (Primary)  Assessment & Plan:  Labs today. She continues to have issues with thinning hair and fatigue. Will send to endo for further workup and management    Orders:  -     TSH  -     T4, Free  -     Ambulatory Referral to Endocrinology    2. Anemia, unspecified type  -     CBC & Differential  -     Folate  -     Iron Profile    3. Chronic migraine without aura without status migrainosus, not intractable  Assessment & Plan:  Headaches are improving with treatment.  Continue current treatment regimen.          4. Seasonal allergies    5. Pelvic pain  -     Ambulatory Referral to Physical Therapy Evaluate and treat    6. Osteitis condensans ilii  Assessment & Plan:  Will send to PT for further eval/management    Orders:  -     Ambulatory Referral to Physical Therapy Evaluate and treat    7. Fatigue, unspecified type  -     Comprehensive Metabolic Panel  -     Vitamin B12  -     Vitamin D 25 Hydroxy  -     Ambulatory Referral to Endocrinology    8. Thinning hair  -     Ambulatory Referral to Endocrinology    9. Vitamin D deficiency  -     Vitamin D 25 Hydroxy    10. Family hx of colon cancer requiring screening colonoscopy  -     Ambulatory Referral For Screening Colonoscopy    Other orders  -     albuterol sulfate  (90 Base) MCG/ACT inhaler; Inhale 2 puffs Every 4 (Four) Hours As Needed for Wheezing.  Dispense: 11 g; Refill: 2            Follow Up   Return in about 3 months (around 1/11/2022).  Patient was given instructions and counseling regarding her condition or for health maintenance advice. Please see specific information pulled into the AVS if appropriate.

## 2021-10-11 NOTE — ASSESSMENT & PLAN NOTE
Labs today. She continues to have issues with thinning hair and fatigue. Will send to endo for further workup and management

## 2021-10-19 ENCOUNTER — TELEPHONE (OUTPATIENT)
Dept: INTERNAL MEDICINE | Facility: CLINIC | Age: 36
End: 2021-10-19

## 2021-10-19 DIAGNOSIS — L70.9 ACNE, UNSPECIFIED ACNE TYPE: Primary | ICD-10-CM

## 2021-10-19 NOTE — TELEPHONE ENCOUNTER
Patient has her Derm appointment tomorrow and needing an updated referral.   For acne  Derm specialist  Appointment is tomorrow.

## 2021-11-09 ENCOUNTER — TREATMENT (OUTPATIENT)
Dept: PHYSICAL THERAPY | Facility: CLINIC | Age: 36
End: 2021-11-09

## 2021-11-09 DIAGNOSIS — R10.2 PELVIC PAIN: Primary | ICD-10-CM

## 2021-11-09 DIAGNOSIS — M85.38 OSTEITIS CONDENSANS ILII: ICD-10-CM

## 2021-11-09 PROCEDURE — 97161 PT EVAL LOW COMPLEX 20 MIN: CPT | Performed by: PHYSICAL THERAPIST

## 2021-11-09 NOTE — PROGRESS NOTES
Physical Therapy Initial Evaluation and Plan of Care      Patient: Audra Rendon   : 1985  Diagnosis/ICD-10 Code:  Pelvic pain [R10.2]  Referring practitioner: MINDA Luu  Date of Initial Visit: 2021  Today's Date: 2021  Patient seen for 1 sessions           Subjective Questionnaire: LEFS: 46/80=42% limitation      Subjective Evaluation    History of Present Illness  Mechanism of injury: Patient is a 36 yr old female referred to physical therapy with diagnosis of pelvic pain and osteitis condensans ilii.  Patient reports pain in right pelvis and pubic symphysis pain with prolonged walking/hiking.   Patient reports pain has been going on for 7 yrs (since last childbirth).  Patient states unable to run bc of pain.      Pain  Current pain ratin  At best pain ratin  At worst pain ratin  Quality: pressure and dull ache  Relieving factors: medications, change in position and rest    Patient Goals  Patient goals for therapy: decreased pain and return to sport/leisure activities             Objective          Active Range of Motion   Left Hip   Normal active range of motion    Right Hip   Normal active range of motion    Strength/Myotome Testing     Left Hip   Planes of Motion   Flexion: 4  Extension: 4-  Abduction: 4-  Adduction: 4    Right Hip   Planes of Motion   Flexion: 4  Extension: 4-  Abduction: 4-  Adduction: 4          Assessment & Plan     Assessment  Impairments: activity intolerance, impaired physical strength, lacks appropriate home exercise program and pain with function  Assessment details: Pt presents with limitations, noted by evaluation that impede patient's ability to tolerate functional mobility/activity.  The skills of a therapist will be required to safely and effectively implement the following treatment plan to restore maximal level of function.    Prognosis: good  Functional Limitations: uncomfortable because of pain, standing, stooping and unable to  perform repetitive tasks  Goals  Plan Goals: 1. The patient complains of pelvic pain.   LTG 1: 8 weeks:  The patient will report a pain rating of 2/10 or better in order to improve tolerance to activities of daily living and improve sleep quality.   STATUS:  New   STG 1a: 4 weeks:  The patient will report a pain rating of 4/10 or better.   STATUS:  New      2. The patient demonstrates weakness of the bilateral hip.   LTG 2: 8weeks:  The patient will demonstrate 4+/5 strength for bilateral hip flexion, abduction, and extension in order to improve hip stability.   STATUS:  New   STG 2a: 4 weeks:  The patient will demonstrate 4/5 strength for bilateral hip abduction, and extension.   STATUS:  New      3. Mobility: Walking/Moving Around Functional Limitation     LTG 3:8 weeks:  The patient will demonstrate 12% limitation by achieving a score of 70/80 on the Lower Extremity Functional Scale.   STATUS:  New   STG 3a: 4 weeks:  The patient will demonstrate 25% limitation by achieving a score of 60/80 on the Lower Extremity Functional Scale.     STATUS:  New     TREATMENT:  Therapeutic exercises, manual therapy, aquatic therapy, home exercise instruction,     Plan  Therapy options: will be seen for skilled physical therapy services  Planned modality interventions: cryotherapy, TENS and thermotherapy (hydrocollator packs)  Planned therapy interventions: abdominal trunk stabilization, flexibility, home exercise program, stretching, strengthening, soft tissue mobilization, spinal/joint mobilization, manual therapy and therapeutic activities  Frequency: 2x week  Duration in weeks: 8  Treatment plan discussed with: patient        Visit Diagnoses:    ICD-10-CM ICD-9-CM   1. Pelvic pain  R10.2 [id number]   2. Osteitis condensans ilii  M85.38 733.5       Timed:  Manual Therapy:         mins  49700;  Therapeutic Exercise:         mins  91344;     Neuromuscular Nestor:        mins  16870;    Therapeutic Activity:          mins  90543;      Gait Training:           mins  87443;     Ultrasound:          mins  31327;    Electrical Stimulation:         mins  59941 ( );    Untimed:  Electrical Stimulation:         mins  63775 ( );  Mechanical Traction:         mins  09145;   PT evaluation   46 mins    Timed Treatment:      mins   Total Treatment:     46   mins    PT SIGNATURE: Mayda Anne, PT     Electronically singed 11/9/2021    [place] PT license: [id number]        Initial Certification  Certification Period: 11/9/2021 thru 2/6/2022  I certify that the therapy services are furnished while this patient is under my care.  The services outlined above are required by this patient, and will be reviewed every 90 days.     PHYSICIAN: Lori Norman, APRN      DATE:     Please sign and return via fax to [phone]  Thank you, [facility].

## 2021-11-11 ENCOUNTER — OFFICE VISIT (OUTPATIENT)
Dept: INTERNAL MEDICINE | Facility: CLINIC | Age: 36
End: 2021-11-11

## 2021-11-11 VITALS
HEIGHT: 65 IN | DIASTOLIC BLOOD PRESSURE: 63 MMHG | TEMPERATURE: 97.6 F | HEART RATE: 94 BPM | SYSTOLIC BLOOD PRESSURE: 116 MMHG | WEIGHT: 129.2 LBS | OXYGEN SATURATION: 100 % | BODY MASS INDEX: 21.52 KG/M2

## 2021-11-11 DIAGNOSIS — J30.2 SEASONAL ALLERGIES: Primary | Chronic | ICD-10-CM

## 2021-11-11 PROCEDURE — 99213 OFFICE O/P EST LOW 20 MIN: CPT | Performed by: NURSE PRACTITIONER

## 2021-11-11 RX ORDER — LEVOCETIRIZINE DIHYDROCHLORIDE 5 MG/1
5 TABLET, FILM COATED ORAL EVERY EVENING
Qty: 60 TABLET | Refills: 0 | Status: SHIPPED | OUTPATIENT
Start: 2021-11-11 | End: 2023-01-13

## 2021-11-11 RX ORDER — FLUTICASONE PROPIONATE 50 MCG
2 SPRAY, SUSPENSION (ML) NASAL DAILY
Qty: 9.96 ML | Refills: 3 | Status: SHIPPED | OUTPATIENT
Start: 2021-11-11 | End: 2023-01-13

## 2021-11-11 NOTE — ASSESSMENT & PLAN NOTE
We will change patient over to levocetirizine and Flonase.  We will try this regimen and see if it gives her better seasonal allergy relief.  No other concerning signs or symptoms today.  Physical exam reassuring.  Advised to follow-up if needed.

## 2021-11-11 NOTE — PROGRESS NOTES
"Chief Complaint  Nasal Congestion, drainage, and Earache    Subjective         Audra Rendon presents to [facility] for Nasal congestion, sinus drainage and earache.    Patient reports that over the last several days she has noticed nasal congestion, drainage and earache.  Her kids have also had similar symptoms.  She denies any other symptoms.  She does voice that she gets pretty bad seasonal allergies, she is on Allegra, but she reports that this usually does not relieve all of her symptoms.  No ill contacts.         Review of Systems   Constitutional: Negative for chills, fatigue and fever.   HENT: Positive for congestion, ear pain and sneezing. Negative for sinus pressure and sore throat.    Respiratory: Negative for cough and shortness of breath.    Gastrointestinal: Negative for diarrhea, rectal pain and vomiting.   Neurological: Negative for headaches.       Objective   Vital Signs:   /63   Pulse 94   Temp 97.6 °F (36.4 °C)   Ht 165.1 cm (65\")   Wt 58.6 kg (129 lb 3.2 oz)   SpO2 100%   BMI 21.50 kg/m²     Physical Exam  Vitals and nursing note reviewed.   Constitutional:       Appearance: Normal appearance. She is normal weight.   HENT:      Head: Normocephalic and atraumatic.      Right Ear: Tympanic membrane normal.      Left Ear: Tympanic membrane normal.      Nose: Nose normal.      Mouth/Throat:      Mouth: Mucous membranes are moist.      Pharynx: Oropharynx is clear.   Eyes:      Conjunctiva/sclera: Conjunctivae normal.      Pupils: Pupils are equal, round, and reactive to light.   Cardiovascular:      Rate and Rhythm: Normal rate and regular rhythm.   Pulmonary:      Effort: Pulmonary effort is normal.      Breath sounds: Normal breath sounds.   Neurological:      General: No focal deficit present.      Mental Status: She is alert.   Psychiatric:         Mood and Affect: Mood normal.        Result Review :                   Diagnoses and all orders for this " visit:    1. Seasonal allergies (Primary)  Assessment & Plan:  We will change patient over to levocetirizine and Flonase.  We will try this regimen and see if it gives her better seasonal allergy relief.  No other concerning signs or symptoms today.  Physical exam reassuring.  Advised to follow-up if needed.      Other orders  -     levocetirizine (XYZAL) 5 MG tablet; Take 1 tablet by mouth Every Evening.  Dispense: 60 tablet; Refill: 0  -     fluticasone (Flonase) 50 MCG/ACT nasal spray; 2 sprays into the nostril(s) as directed by provider Daily.  Dispense: 9.96 mL; Refill: 3        Follow Up   Return if symptoms worsen or fail to improve.  Patient was given instructions and counseling regarding her condition or for health maintenance advice. Please see specific information pulled into the AVS if appropriate.     MINDA Malagon  11/11/2021  This note was electronically signed.

## 2021-12-03 ENCOUNTER — TREATMENT (OUTPATIENT)
Dept: PHYSICAL THERAPY | Facility: CLINIC | Age: 36
End: 2021-12-03

## 2021-12-03 DIAGNOSIS — R10.2 PELVIC PAIN: Primary | ICD-10-CM

## 2021-12-03 DIAGNOSIS — M85.38 OSTEITIS CONDENSANS ILII: ICD-10-CM

## 2021-12-03 PROCEDURE — 97110 THERAPEUTIC EXERCISES: CPT | Performed by: PHYSICAL THERAPIST

## 2021-12-03 NOTE — PROGRESS NOTES
Physical Therapy Daily Treatment Note      Patient: Audra Rendon   : 1985  Referring practitioner: MINDA Luu  Date of Initial Visit: Type: THERAPY  Noted: 2021  Today's Date: 12/3/2021  Patient seen for 2 sessions           Subjective   Audra Rendon reports: still having pelvic pain 3/10; mostly right sided.     Objective   See Exercise, Manual, and Modality Logs for complete treatment.       Assessment & Plan     Assessment  Prognosis details: Patient strengthening exercises for core/hip today without increase symptoms of pain; noted laxity in hips/SI joint with activity. Reviewed possible getting SI joint belt.        Visit Diagnoses:    ICD-10-CM ICD-9-CM   1. Pelvic pain  R10.2 [id number]   2. Osteitis condensans ilii  M85.38 733.5       Progress strengthening /stabilization /functional activity           Timed:  Manual Therapy:         mins  59288;  Therapeutic Exercise:    39     mins  44734;     Neuromuscular Nestor:        mins  15164;    Therapeutic Activity:          mins  06483;     Gait Training:           mins  72066;     Ultrasound:          mins  86305;    Electrical Stimulation:         mins  35096 ( );    Untimed:  Electrical Stimulation:         mins  77872 ( );  Mechanical Traction:         mins  54353;     Timed Treatment:   39   mins   Total Treatment:     39   mins  Mayda Anne PT    Electronically singed 12/3/2021      [place] PT license: [id number]  Physical Therapist

## 2021-12-07 ENCOUNTER — TREATMENT (OUTPATIENT)
Dept: PHYSICAL THERAPY | Facility: CLINIC | Age: 36
End: 2021-12-07

## 2021-12-07 DIAGNOSIS — R10.2 PELVIC PAIN: Primary | ICD-10-CM

## 2021-12-07 DIAGNOSIS — M85.38 OSTEITIS CONDENSANS ILII: ICD-10-CM

## 2021-12-07 PROCEDURE — 97110 THERAPEUTIC EXERCISES: CPT | Performed by: PHYSICAL THERAPIST

## 2021-12-07 NOTE — PROGRESS NOTES
Physical Therapy Daily Treatment Note      Patient: Audra Rendon   : 1985  Referring practitioner: MINDA Luu  Date of Initial Visit: Type: THERAPY  Noted: 2021  Today's Date: 2021  Patient seen for 3 sessions           Subjective   Audra Rendon reports: no pain today    Objective   See Exercise, Manual, and Modality Logs for complete treatment.       Assessment & Plan     Assessment    Assessment details: Patient continues to tolerate progression of strengthening/stability.        Visit Diagnoses:    ICD-10-CM ICD-9-CM   1. Pelvic pain  R10.2 [id number]   2. Osteitis condensans ilii  M85.38 733.5       Progress strengthening /stabilization /functional activity           Timed:  Manual Therapy:         mins  09475;  Therapeutic Exercise:    23     mins  05661;     Neuromuscular Nestor:        mins  20996;    Therapeutic Activity:          mins  26196;     Gait Training:           mins  74224;     Ultrasound:          mins  53500;    Electrical Stimulation:         mins  87519 ( );    Untimed:  Electrical Stimulation:         mins  86822 ( );  Mechanical Traction:         mins  62895;     Timed Treatment:   23   mins   Total Treatment:     23   mins  Mayda Anne PT    Electronically singed 2021      [place] PT license: [id number]  Physical Therapist

## 2021-12-14 ENCOUNTER — TREATMENT (OUTPATIENT)
Dept: PHYSICAL THERAPY | Facility: CLINIC | Age: 36
End: 2021-12-14

## 2021-12-14 DIAGNOSIS — R10.2 PELVIC PAIN: Primary | ICD-10-CM

## 2021-12-14 DIAGNOSIS — M85.38 OSTEITIS CONDENSANS ILII: ICD-10-CM

## 2021-12-14 PROCEDURE — 97140 MANUAL THERAPY 1/> REGIONS: CPT | Performed by: PHYSICAL THERAPIST

## 2021-12-14 NOTE — PROGRESS NOTES
Physical Therapy Daily Treatment Note      Patient: Audra Rendon   : 1985  Referring practitioner: MINDA Luu  Date of Initial Visit: Type: THERAPY  Noted: 2021  Today's Date: 2021  Patient seen for 4 sessions           Subjective  Audra Rendon reports:  She described various referred sensations during manual therapy. Audra commented that she felt different after care but had no definitive pain rating.    Objective   See Exercise, Manual, and Modality Logs for complete treatment.       Assessment/Plan  Pt to address posture often as she goes through her day. She also noted that per x ray it was revealed she has a thoracic rotation. This is evident with rib rotations identified while supine. MFR to reduce tightness effective. Continued skilled care to attend to deficits outlined, to aid strengthening and return to less painful ADL's.  Visit Diagnoses:    ICD-10-CM ICD-9-CM   1. Pelvic pain  R10.2 [id number]   2. Osteitis condensans ilii  M85.38 733.5       Progress per Plan of Care and Progress strengthening /stabilization /functional activity           Timed:  Manual Therapy:    25     mins  73061;  Therapeutic Exercise:         mins  75972;     Neuromuscular Nestor:        mins  39640;    Therapeutic Activity:          mins  20068;     Gait Training:           mins  84145;     Ultrasound:          mins  54750;    Electrical Stimulation:         mins  88185 ( );  Aquatics  __   mins   60069    Untimed:  Electrical Stimulation:         mins  44841 ( );  Mechanical Traction:         mins  22278;     Timed Treatment:   25   mins   Total Treatment:     25   mins    Electronically Signed:  Saira Aguilera PTA  Physical Therapist Assistant    [place] [facility] license [id number]

## 2021-12-16 ENCOUNTER — TELEPHONE (OUTPATIENT)
Dept: PHYSICAL THERAPY | Facility: CLINIC | Age: 36
End: 2021-12-16

## 2021-12-17 ENCOUNTER — TREATMENT (OUTPATIENT)
Dept: PHYSICAL THERAPY | Facility: CLINIC | Age: 36
End: 2021-12-17

## 2021-12-17 DIAGNOSIS — M85.38 OSTEITIS CONDENSANS ILII: ICD-10-CM

## 2021-12-17 DIAGNOSIS — R10.2 PELVIC PAIN: Primary | ICD-10-CM

## 2021-12-17 PROCEDURE — 97110 THERAPEUTIC EXERCISES: CPT | Performed by: PHYSICAL THERAPIST

## 2021-12-17 NOTE — PROGRESS NOTES
Physical Therapy Progress Note      Patient: Audra Rendon   : 1985  Referring practitioner: MINDA Luu  Date of Initial Visit: Type: THERAPY  Noted: 2021  Today's Date: 2021  Patient seen for 5 sessions           Subjective   Audra Rendon reports: no pain this afternoon.    Objective          Active Range of Motion   Left Hip   Normal active range of motion    Right Hip   Normal active range of motion    Strength/Myotome Testing     Left Hip   Planes of Motion   Flexion: 4  Extension: 4-  Abduction: 4  Adduction: 4    Right Hip   Planes of Motion   Flexion: 4  Extension: 4-  Abduction: 4  Adduction: 4      See Exercise, Manual, and Modality Logs for complete treatment.       Assessment & Plan     Assessment  Impairments: activity intolerance, impaired physical strength, lacks appropriate home exercise program and pain with function  Functional Limitations: uncomfortable because of pain, standing, stooping and unable to perform repetitive tasks  Assessment details: Pt presents with limitations, noted by evaluation that impede patient's ability to tolerate functional mobility/activity.  The skills of a therapist will be required to safely and effectively implement the following treatment plan to restore maximal level of function.    Prognosis: good    Goals  Plan Goals: 1. The patient complains of pelvic pain.   LTG 1: 8 weeks:  The patient will report a pain rating of 2/10 or better in order to improve tolerance to activities of daily living and improve sleep quality.   STATUS:  ongoing  STG 1a: 4 weeks:  The patient will report a pain rating of 4/10 or better.   STATUS: ongoing     2. The patient demonstrates weakness of the bilateral hip.   LTG 2: 8weeks:  The patient will demonstrate 4+/5 strength for bilateral hip flexion, abduction, and extension in order to improve hip stability.   STATUS: ongoing   STG 2a: 4 weeks:  The patient will demonstrate 4/5 strength for bilateral  hip abduction, and extension.   STATUS:  ongoing     3. Mobility: Walking/Moving Around Functional Limitation     LTG 3:8 weeks:  The patient will demonstrate 12% limitation by achieving a score of 70/80 on the Lower Extremity Functional Scale.   STATUS:  ongoing   STG 3a: 4 weeks:  The patient will demonstrate 25% limitation by achieving a score of 60/80 on the Lower Extremity Functional Scale.     STATUS:  ongoing     TREATMENT:  Therapeutic exercises, manual therapy, aquatic therapy, home exercise instruction,     Plan  Therapy options: will be seen for skilled therapy services  Planned modality interventions: cryotherapy, TENS and thermotherapy (hydrocollator packs)  Planned therapy interventions: abdominal trunk stabilization, flexibility, home exercise program, stretching, strengthening, soft tissue mobilization, spinal/joint mobilization, manual therapy and therapeutic activities  Frequency: 2x week  Duration in weeks: 4  Treatment plan discussed with: patient        Visit Diagnoses:    ICD-10-CM ICD-9-CM   1. Pelvic pain  R10.2 [id number]   2. Osteitis condensans ilii  M85.38 733.5       Progress per Plan of Care           Timed:  Manual Therapy:         mins  41492;  Therapeutic Exercise:    27     mins  53741;     Neuromuscular Nestor:        mins  31714;    Therapeutic Activity:          mins  69155;     Gait Training:           mins  94026;     Ultrasound:          mins  54241;    Electrical Stimulation:         mins  98638 ( );    Untimed:  Electrical Stimulation:         mins  84087 ( );  Mechanical Traction:         mins  56284;     Timed Treatment:   27   mins   Total Treatment:     27   mins  Mayda Anne PT    Electronically singed 12/17/2021      [place] PT license: [id number]  Physical Therapist

## 2021-12-23 ENCOUNTER — TREATMENT (OUTPATIENT)
Dept: PHYSICAL THERAPY | Facility: CLINIC | Age: 36
End: 2021-12-23

## 2021-12-23 DIAGNOSIS — M85.38 OSTEITIS CONDENSANS ILII: ICD-10-CM

## 2021-12-23 DIAGNOSIS — R10.2 PELVIC PAIN: Primary | ICD-10-CM

## 2021-12-23 PROCEDURE — 97110 THERAPEUTIC EXERCISES: CPT | Performed by: PHYSICAL THERAPIST

## 2021-12-23 NOTE — PROGRESS NOTES
Physical Therapy Daily Treatment Note      Patient: Audra Rendon   : 1985  Referring practitioner: MINDA Luu  Date of Initial Visit: Type: THERAPY  Noted: 2021  Today's Date: 2021  Patient seen for 6 sessions           Subjective   Audra Rendon reports: not much pelvic pain    Objective   See Exercise, Manual, and Modality Logs for complete treatment.       Assessment & Plan     Assessment  Prognosis details: Patient continues to tolerate progression of core strengthening.        Visit Diagnoses:    ICD-10-CM ICD-9-CM   1. Pelvic pain  R10.2 [id number]   2. Osteitis condensans ilii  M85.38 733.5       Progress strengthening /stabilization /functional activity           Timed:  Manual Therapy:         mins  80674;  Therapeutic Exercise:    29     mins  87917;     Neuromuscular Nestor:        mins  37241;    Therapeutic Activity:          mins  33599;     Gait Training:           mins  31611;     Ultrasound:          mins  03328;    Electrical Stimulation:         mins  06388 ( );    Untimed:  Electrical Stimulation:         mins  14205 ( );  Mechanical Traction:         mins  75396;     Timed Treatment:   29   mins   Total Treatment:     29   mins  Mayda Anne PT    Electronically singed 2021      [place] PT license: [id number]  Physical Therapist

## 2021-12-28 ENCOUNTER — TELEPHONE (OUTPATIENT)
Dept: PHYSICAL THERAPY | Facility: CLINIC | Age: 36
End: 2021-12-28

## 2022-01-01 PROCEDURE — U0004 COV-19 TEST NON-CDC HGH THRU: HCPCS | Performed by: NURSE PRACTITIONER

## 2022-01-05 ENCOUNTER — TREATMENT (OUTPATIENT)
Dept: PHYSICAL THERAPY | Facility: CLINIC | Age: 37
End: 2022-01-05

## 2022-01-05 DIAGNOSIS — M85.38 OSTEITIS CONDENSANS ILII: ICD-10-CM

## 2022-01-05 DIAGNOSIS — R10.2 PELVIC PAIN: Primary | ICD-10-CM

## 2022-01-05 PROCEDURE — 97140 MANUAL THERAPY 1/> REGIONS: CPT | Performed by: PHYSICAL THERAPIST

## 2022-01-05 PROCEDURE — 97110 THERAPEUTIC EXERCISES: CPT | Performed by: PHYSICAL THERAPIST

## 2022-01-05 NOTE — PROGRESS NOTES
Physical Therapy Daily Treatment Note      Patient: Audra Rendon   : 1985  Referring practitioner: MINDA Luu  Date of Initial Visit: Type: THERAPY  Noted: 2021  Today's Date: 2022  Patient seen for 7 sessions           Subjective  Audra Rendon reports: she has been feeling good in terms of her initial c/o. Have been paying attention to my posture and it really is helpful.        Objective   See Exercise, Manual, and Modality Logs for complete treatment.       Assessment/Plan  Skilled care to further pts gross strength for pain relief to improve functional ability.   Visit Diagnoses:    ICD-10-CM ICD-9-CM   1. Pelvic pain  R10.2 [id number]   2. Osteitis condensans ilii  M85.38 733.5       Continue per outlined POC.           Timed:  Manual Therapy:   12      mins  97305;  Therapeutic Exercise:   14     mins  10915;     Neuromuscular Nestor:        mins  75579;    Therapeutic Activity:          mins  43263;     Gait Training:           mins  39414;     Ultrasound:          mins  86640;    Electrical Stimulation:         mins  70950 ( );  Aquatics  __   mins   02193    Untimed:  Electrical Stimulation:         mins  71428 ( );  Mechanical Traction:         mins  21409;     Timed Treatment:   26   mins   Total Treatment:     26   mins    Electronically Signed:  Saira Aguilera PTA  Physical Therapist Assistant    [place] PTA license [id number]

## 2022-01-10 ENCOUNTER — TREATMENT (OUTPATIENT)
Dept: PHYSICAL THERAPY | Facility: CLINIC | Age: 37
End: 2022-01-10

## 2022-01-10 DIAGNOSIS — M85.38 OSTEITIS CONDENSANS ILII: ICD-10-CM

## 2022-01-10 DIAGNOSIS — R10.2 PELVIC PAIN: Primary | ICD-10-CM

## 2022-01-10 PROCEDURE — 97530 THERAPEUTIC ACTIVITIES: CPT | Performed by: PHYSICAL THERAPIST

## 2022-01-10 PROCEDURE — 97110 THERAPEUTIC EXERCISES: CPT | Performed by: PHYSICAL THERAPIST

## 2022-01-10 NOTE — PROGRESS NOTES
"Physical Therapy Daily Treatment Note      Patient: Audra Rendon   : 1985  Referring practitioner: MINDA Luu  Date of Initial Visit: Type: THERAPY  Noted: 2021  Today's Date: 1/10/2022  Patient seen for 8 sessions           Subjective  Audra Rendon reports: \"no pain, tired but no pain.\"       Objective   See Exercise, Manual, and Modality Logs for complete treatment.       Assessment/Plan   Audra learned techniques to control her SI hypermobility and did well maintaining this during exercises.     Visit Diagnoses:    ICD-10-CM ICD-9-CM   1. Pelvic pain  R10.2 [id number]   2. Osteitis condensans ilii  M85.38 733.5       Progress per Plan of Care and Progress strengthening /stabilization /functional activity           Timed:  Manual Therapy:         mins  69785;  Therapeutic Exercise:    18     mins  68607;     Neuromuscular Nestor:        mins  02920;    Therapeutic Activity:     12     mins  29438;     Gait Training:           mins  94307;     Ultrasound:          mins  67629;    Electrical Stimulation:         mins  02281 ( );  Aquatics  __   mins   10398    Untimed:  Electrical Stimulation:         mins  44593 ( );  Mechanical Traction:         mins  02797;     Timed Treatment:   30   mins   Total Treatment:     30   mins    Electronically Signed:  Saira Aguilera PTA  Physical Therapist Assistant    [place] PTA license [id number]            "

## 2022-01-11 ENCOUNTER — OFFICE VISIT (OUTPATIENT)
Dept: INTERNAL MEDICINE | Facility: CLINIC | Age: 37
End: 2022-01-11

## 2022-01-11 ENCOUNTER — PRIOR AUTHORIZATION (OUTPATIENT)
Dept: INTERNAL MEDICINE | Facility: CLINIC | Age: 37
End: 2022-01-11

## 2022-01-11 VITALS
OXYGEN SATURATION: 95 % | BODY MASS INDEX: 21.76 KG/M2 | WEIGHT: 130.6 LBS | RESPIRATION RATE: 18 BRPM | HEART RATE: 58 BPM | DIASTOLIC BLOOD PRESSURE: 68 MMHG | SYSTOLIC BLOOD PRESSURE: 116 MMHG | TEMPERATURE: 96.2 F | HEIGHT: 65 IN

## 2022-01-11 DIAGNOSIS — L65.9 THINNING HAIR: ICD-10-CM

## 2022-01-11 DIAGNOSIS — E07.9 THYROID DISORDER: Chronic | ICD-10-CM

## 2022-01-11 DIAGNOSIS — Z51.81 MEDICATION MONITORING ENCOUNTER: ICD-10-CM

## 2022-01-11 DIAGNOSIS — Z15.89 MTHFR GENE MUTATION: ICD-10-CM

## 2022-01-11 DIAGNOSIS — G43.709 CHRONIC MIGRAINE WITHOUT AURA WITHOUT STATUS MIGRAINOSUS, NOT INTRACTABLE: Primary | ICD-10-CM

## 2022-01-11 PROBLEM — L70.9 ACNE: Status: ACTIVE | Noted: 2022-01-11

## 2022-01-11 LAB
AMPHET+METHAMPHET UR QL: POSITIVE
AMPHETAMINE INTERNAL CONTROL: ABNORMAL
AMPHETAMINES UR QL: NEGATIVE
BARBITURATE INTERNAL CONTROL: ABNORMAL
BARBITURATES UR QL SCN: NEGATIVE
BENZODIAZ UR QL SCN: POSITIVE
BENZODIAZEPINE INTERNAL CONTROL: ABNORMAL
BUPRENORPHINE INTERNAL CONTROL: ABNORMAL
BUPRENORPHINE SERPL-MCNC: NEGATIVE NG/ML
CANNABINOIDS SERPL QL: NEGATIVE
COCAINE INTERNAL CONTROL: ABNORMAL
COCAINE UR QL: NEGATIVE
EXPIRATION DATE: ABNORMAL
Lab: ABNORMAL
MDMA (ECSTASY) INTERNAL CONTROL: ABNORMAL
MDMA UR QL SCN: NEGATIVE
METHADONE INTERNAL CONTROL: ABNORMAL
METHADONE UR QL SCN: NEGATIVE
METHAMPHETAMINE INTERNAL CONTROL: ABNORMAL
OPIATES INTERNAL CONTROL: ABNORMAL
OPIATES UR QL: NEGATIVE
OXYCODONE INTERNAL CONTROL: ABNORMAL
OXYCODONE UR QL SCN: NEGATIVE
PCP UR QL SCN: NEGATIVE
PHENCYCLIDINE INTERNAL CONTROL: ABNORMAL
THC INTERNAL CONTROL: ABNORMAL

## 2022-01-11 PROCEDURE — 99214 OFFICE O/P EST MOD 30 MIN: CPT | Performed by: NURSE PRACTITIONER

## 2022-01-11 PROCEDURE — 80305 DRUG TEST PRSMV DIR OPT OBS: CPT | Performed by: NURSE PRACTITIONER

## 2022-01-11 RX ORDER — MINOXIDIL 10 MG/1
10 TABLET ORAL DAILY
Qty: 90 TABLET | Refills: 0 | Status: CANCELLED | OUTPATIENT
Start: 2022-01-11

## 2022-01-11 RX ORDER — BUTALBITAL, ACETAMINOPHEN AND CAFFEINE 50; 325; 40 MG/1; MG/1; MG/1
1 TABLET ORAL EVERY 4 HOURS PRN
Qty: 30 TABLET | Refills: 0 | Status: SHIPPED | OUTPATIENT
Start: 2022-01-11 | End: 2023-01-13 | Stop reason: SDUPTHER

## 2022-01-11 NOTE — PROGRESS NOTES
Chief Complaint  Follow-up (3 Month), Hypothyroidism, and Anemia    Subjective          Audra Rendon presents to [facility]  History of Present Illness    Audra Rendon is a 36-year-old female who presents today for follow-up on hypothyroidism, anemia, and migraine headaches. She has been battling hair loss for many years now. 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 Her labs were noted to be normal except for a MTHFR gene mutation which was aware of already. Of note, her mother has a history of Hashimoto's. She also reports following with an endocrinologist recently. She states her iron saturation and thyroid levels have always fluctuated. She also has vitamin Deficiency and her vitamin D level was noted to be low. The patient notes taking over the counter vitamin D supplements. She recalls discussing minoxidil in the past for her hair loss.     Her headaches have become less frequent. She denies changing anything to bring about the less headaches. The patient has noticed getting her headaches on the days that she works. The patient currently works [employer] and thinks the masks might be contributing to that. She is requesting a refill of her Fioricet. The patient has been also getting more fatigued of late but she is not as concerned.     The patient reports getting braces recently. In regards to her face, she reports taking Bactrim which has helped clear up her acne. She reports having a severe sore throat during New Year's Justa. She reports having a lot of exudate, swelling of her tonsils as well as lymphadenopathy. The patient recalls being evaluated for this, however, she notes they did not look into her throat. They did check her for strep throat and flu, however, these were all negative. She was treated with amoxicillin which helped improve her symptoms. Currently, she denies any fevers,  "chills, or sore throat.     Objective   Vital Signs:   /68 (BP Location: Left arm, Patient Position: Sitting, Cuff Size: Adult)   Pulse 58   Temp 96.2 °F (35.7 °C)   Resp 18   Ht 165.1 cm (65\")   Wt 59.2 kg (130 lb 9.6 oz)   SpO2 95%   BMI 21.73 kg/m²     Physical Exam  Vitals and nursing note reviewed.   Constitutional:       General: She is not in acute distress.     Appearance: Normal appearance. She is well-developed. She is not ill-appearing, toxic-appearing or diaphoretic.   HENT:      Head: Normocephalic and atraumatic.      Right Ear: Tympanic membrane and external ear normal.      Left Ear: Tympanic membrane and external ear normal.      Nose: Nose normal.      Mouth/Throat:      Mouth: Mucous membranes are moist.      Pharynx: No oropharyngeal exudate or posterior oropharyngeal erythema.   Eyes:      Conjunctiva/sclera: Conjunctivae normal.      Pupils: Pupils are equal, round, and reactive to light.   Neck:      Thyroid: No thyromegaly.      Vascular: No carotid bruit or JVD.   Cardiovascular:      Rate and Rhythm: Normal rate and regular rhythm.      Pulses: Normal pulses.      Heart sounds: Normal heart sounds. No murmur heard.      Pulmonary:      Effort: Pulmonary effort is normal. No respiratory distress.      Breath sounds: Normal breath sounds.   Abdominal:      General: Bowel sounds are normal.      Palpations: Abdomen is soft. There is no mass.      Tenderness: There is no abdominal tenderness.   Musculoskeletal:         General: No swelling. Normal range of motion.      Cervical back: Normal range of motion and neck supple.   Lymphadenopathy:      Cervical: No cervical adenopathy.   Skin:     General: Skin is warm and dry.      Findings: No lesion or rash.   Neurological:      Mental Status: She is alert and oriented to person, place, and time.      Cranial Nerves: No cranial nerve deficit.      Sensory: No sensory deficit.      Motor: No weakness.      Coordination: Coordination " normal.      Gait: Gait normal.      Deep Tendon Reflexes: Reflexes are normal and symmetric.   Psychiatric:         Mood and Affect: Mood normal.         Behavior: Behavior normal.         Thought Content: Thought content normal.         Judgment: Judgment normal.          Result Review :{Labs  Result Review  Imaging  Med Tab  Media  Procedures :23}            Procedures      Assessment and Plan    Diagnoses and all orders for this visit:    1. Chronic migraine without aura without status migrainosus, not intractable (Primary)  - Her headaches are less frequent now but she continues to have breakthrough headaches. We will refill her Fioricet.  -     butalbital-acetaminophen-caffeine (Fioricet) -40 MG per tablet; Take 1 tablet by mouth Every 4 (Four) Hours As Needed for Headache.  Dispense: 30 tablet; Refill: 0    We will get UDS and complete controlled substance agreement today in the office.    2. Thyroid disorder    3. Thinning hair  -Discussed oral minoxidil option with attending physician, Dr. Palacio.  She recommended trying a topical first and sending to dermatology if this does not improve symptoms.  Patient already on from the office after this discussion.  Called the patient and left detailed voicemail of change in plan and to call the office if she has concerns regarding this plan.    4. MTHFR gene mutation    5. Medication monitoring encounter  -     POC Urine Drug Screen Premier Bio-Cup    Other orders  -     Minoxidil 5 % foam; Apply 0.5 each topically Daily. 0.5 capful daily  Dispense: 60 g; Refill: 2              Follow Up   Return in about 3 months (around 4/11/2022).  Patient was given instructions and counseling regarding her condition or for health maintenance advice. Please see specific information pulled into the AVS if appropriate.       Transcribed from ambient dictation for MINDA Luu by Debbie HARDING Rep.  01/11/22   11:50 EST    Patient verbalized consent to the  visit recording.

## 2022-01-12 ENCOUNTER — PRIOR AUTHORIZATION (OUTPATIENT)
Dept: INTERNAL MEDICINE | Facility: CLINIC | Age: 37
End: 2022-01-12

## 2022-01-12 NOTE — TELEPHONE ENCOUNTER
The request has been approved. The authorization is effective for a maximum of 12 fills from 01/11/2022 to 01/10/2023, as long as the member is enrolled in their current health plan. The request was approved as submitted. Request approved for up to 6 tablets per day.

## 2022-01-14 ENCOUNTER — CLINICAL SUPPORT (OUTPATIENT)
Dept: GASTROENTEROLOGY | Facility: CLINIC | Age: 37
End: 2022-01-14

## 2022-01-14 ENCOUNTER — PREP FOR SURGERY (OUTPATIENT)
Dept: OTHER | Facility: HOSPITAL | Age: 37
End: 2022-01-14

## 2022-01-14 DIAGNOSIS — Z12.11 ENCOUNTER FOR SCREENING COLONOSCOPY: Primary | ICD-10-CM

## 2022-01-14 DIAGNOSIS — Z80.0 FAMILY HISTORY OF COLON CANCER: ICD-10-CM

## 2022-01-14 DIAGNOSIS — Z83.71 FAMILY HISTORY OF COLONIC POLYPS: ICD-10-CM

## 2022-01-14 PROBLEM — Z83.719 FAMILY HISTORY OF COLONIC POLYPS: Status: ACTIVE | Noted: 2022-01-14

## 2022-01-14 NOTE — PROGRESS NOTES
Ms Rendon is scheduled for a colonoscopy 03/15/2022. Send in prep- [facility] to pharm. Pt voiced understanding to  prep soon. Jordan

## 2022-01-14 NOTE — PROGRESS NOTES
Audra Rendon  REASON FOR CALL: Screening Colonoscopy, Fm Hx Colon Cancer: Aunt, Fm Hx Colon Polyps: Mother  SENT IN PREP: [facility]  Past Medical History:   Diagnosis Date   • Allergies    • Anemia    • Anxiety and depression    • Asthma    • Bipolar 1 disorder (HCC)    • Cystic acne    • Gall stones    • Head injury    • Hearing loss    • Intrinsic sphincter deficiency (ISD)    • Iron deficiency    • Kidney calculi    • Migraine    • Migraine headache    • Stress incontinence    • Thyroid disorder    • Thyroid dysfunction, postpartum (-CS AGAIN) (3-2014-TO )    RESOLVED   • Tinnitus      Allergies   Allergen Reactions   • Morphine Unknown - High Severity     Past Surgical History:   Procedure Laterality Date   • D & C HYSTEROSCOPY ENDOMETRIAL ABLATION N/A 6/15/2017    Procedure: DILATATION AND CURETTAGE HYSTEROSCOPY NOVASURE ENDOMETRIAL ABLATION;  Surgeon: Hebert Wallace MD;  Location: [facility];  Service:    • DILATATION AND CURETTAGE     • LAPAROSCOPIC CHOLECYSTECTOMY  2007   • LASER ABLATION     • TRANSVAGINAL TAPING SUSPENSION N/A 6/15/2017    Procedure: RETROPUBIC SLING W/CYSTOSCOPY ;  Surgeon: Hebert Wallace MD;  Location: [facility];  Service:    • TUBAL ABDOMINAL LIGATION     • WISDOM TOOTH EXTRACTION       Social History     Socioeconomic History   • Marital status:    Tobacco Use   • Smoking status: Former Smoker     Packs/day: 1.50     Years: 6.00     Pack years: 9.00     Types: Cigarettes     Quit date: 2014     Years since quittin.7   • Smokeless tobacco: Never Used   Vaping Use   • Vaping Use: Never used   Substance and Sexual Activity   • Alcohol use: Yes     Alcohol/week: 2.0 standard drinks     Types: 2 Shots of liquor per week     Comment: [employer] OCCASION DRINKER/ 2x week   • Drug use: Not Currently     Types: Marijuana     Comment: as a teen   • Sexual activity: Defer     Family History   Problem Relation Age of Onset   • Cancer  Paternal Grandfather    • Heart disease Paternal Grandfather    • Cancer Other    • Colon polyps Mother    • Colon cancer Maternal Aunt    • Malig Hyperthermia Neg Hx        Current Outpatient Medications:   •  ACYCLOVIR PO, Take 500 mg by mouth., Disp: , Rfl:   •  albuterol sulfate  (90 Base) MCG/ACT inhaler, Inhale 2 puffs Every 4 (Four) Hours As Needed for Wheezing., Disp: 11 g, Rfl: 2  •  amoxicillin (AMOXIL) 875 MG tablet, Take 1 tablet by mouth 2 (Two) Times a Day., Disp: 14 tablet, Rfl: 0  •  beclomethasone (QVAR) 80 MCG/ACT inhaler, Inhale 1 puff 2 (Two) Times a Day., Disp: , Rfl:   •  beclomethasone (QVAR) 80 MCG/ACT inhaler, Inhale 1 puff., Disp: , Rfl:   •  buPROPion (WELLBUTRIN) 75 MG tablet, Take 75 mg by mouth Daily., Disp: , Rfl:   •  buPROPion XL (WELLBUTRIN XL) 150 MG 24 hr tablet, , Disp: , Rfl:   •  busPIRone (BUSPAR) 15 MG tablet, Take 15 mg by mouth 3 (Three) Times a Day., Disp: , Rfl:   •  butalbital-acetaminophen-caffeine (Fioricet) -40 MG per tablet, Take 1 tablet by mouth Every 4 (Four) Hours As Needed for Headache., Disp: 30 tablet, Rfl: 0  •  Chlorcyclizine-Pseudoephed (Stahist AD) 25-60 MG tablet, Take 1 tablet by mouth 3 (Three) Times a Day As Needed (Sinus congestion)., Disp: 42 tablet, Rfl: 0  •  Emollient (COLLAGEN EX), Apply  topically., Disp: , Rfl:   •  fluticasone (Flonase) 50 MCG/ACT nasal spray, 2 sprays into the nostril(s) as directed by provider Daily., Disp: 9.96 mL, Rfl: 3  •  l-methylfolate 15 MG tablet tablet, Take 15 mg by mouth Daily., Disp: , Rfl:   •  levocetirizine (XYZAL) 5 MG tablet, Take 1 tablet by mouth Every Evening., Disp: 60 tablet, Rfl: 0  •  Minoxidil 5 % foam, Apply 0.5 each topically Daily. 0.5 capful daily, Disp: 60 g, Rfl: 2  •  multivitamin with minerals tablet tablet, Take 1 tablet by mouth Daily., Disp: , Rfl:   •  predniSONE (DELTASONE) 50 MG tablet, Take 1 tablet by mouth Daily., Disp: 5 tablet, Rfl: 0  •  Sulfamethoxazole-Trimethoprim  (BACTRIM PO), Take  by mouth., Disp: , Rfl:   •  Vyvanse 70 MG capsule, , Disp: , Rfl:

## 2022-01-18 ENCOUNTER — TREATMENT (OUTPATIENT)
Dept: PHYSICAL THERAPY | Facility: CLINIC | Age: 37
End: 2022-01-18

## 2022-01-18 DIAGNOSIS — M85.38 OSTEITIS CONDENSANS ILII: ICD-10-CM

## 2022-01-18 DIAGNOSIS — R10.2 PELVIC PAIN: Primary | ICD-10-CM

## 2022-01-18 PROCEDURE — 97110 THERAPEUTIC EXERCISES: CPT | Performed by: PHYSICAL THERAPIST

## 2022-01-18 NOTE — PROGRESS NOTES
Physical Therapy Progress Note      Patient: Audra Rendon   : 1985  Referring practitioner: MINDA Luu  Date of Initial Visit: Type: THERAPY  Noted: 2021  Today's Date: 2022  Patient seen for 9 sessions           Subjective   Audra Rendon reports: pain in groin 3/10 today  Subjective Questionnaire: LEFS: 60/80=25% limitation improved from prior score 46/80=42% limitation      Objective          Active Range of Motion   Left Hip   Normal active range of motion    Right Hip   Normal active range of motion    Strength/Myotome Testing     Left Hip   Planes of Motion   Flexion: 4  Extension: 4  Abduction: 4  Adduction: 4    Right Hip   Planes of Motion   Flexion: 4  Extension: 4  Abduction: 4  Adduction: 4      See Exercise, Manual, and Modality Logs for complete treatment.       Assessment & Plan     Assessment  Impairments: activity intolerance, impaired physical strength, lacks appropriate home exercise program and pain with function  Functional Limitations: uncomfortable because of pain, standing, stooping and unable to perform repetitive tasks  Assessment details: Pt presents with limitations, noted by evaluation that impede patient's ability to tolerate functional mobility/activity.  The skills of a therapist will be required to safely and effectively implement the following treatment plan to restore maximal level of function.    Prognosis: good    Goals  Plan Goals: 1. The patient complains of pelvic pain.   LTG 1: 8 weeks:  The patient will report a pain rating of 2/10 or better in order to improve tolerance to activities of daily living and improve sleep quality.   STATUS:  ongoing  STG 1a: 4 weeks:  The patient will report a pain rating of 4/10 or better.   STATUS: ongoing     2. The patient demonstrates weakness of the bilateral hip.   LTG 2: 8weeks:  The patient will demonstrate 4+/5 strength for bilateral hip flexion, abduction, and extension in order to improve hip  stability.   STATUS:progressing  STG 2a: 4 weeks:  The patient will demonstrate 4/5 strength for bilateral hip abduction, and extension.   STATUS: Met     3. Mobility: Walking/Moving Around Functional Limitation     LTG 3:8 weeks:  The patient will demonstrate 12% limitation by achieving a score of 70/80 on the Lower Extremity Functional Scale.   STATUS: progressing  STG 3a: 4 weeks:  The patient will demonstrate 25% limitation by achieving a score of 60/80 on the Lower Extremity Functional Scale.     STATUS: Met    TREATMENT:  Therapeutic exercises, manual therapy, aquatic therapy, home exercise instruction,     Plan  Therapy options: will be seen for skilled therapy services  Planned modality interventions: cryotherapy, TENS and thermotherapy (hydrocollator packs)  Planned therapy interventions: abdominal trunk stabilization, flexibility, home exercise program, stretching, strengthening, soft tissue mobilization, spinal/joint mobilization, manual therapy and therapeutic activities  Frequency: 2x week  Duration in weeks: 4  Treatment plan discussed with: patient        Visit Diagnoses:    ICD-10-CM ICD-9-CM   1. Pelvic pain  R10.2 [id number]   2. Osteitis condensans ilii  M85.38 733.5       Progress strengthening /stabilization /functional activity           Timed:  Manual Therapy:         mins  05399;  Therapeutic Exercise:    27     mins  01941;     Neuromuscular Nestor:        mins  77286;    Therapeutic Activity:          mins  22679;     Gait Training:           mins  25403;     Ultrasound:          mins  80609;    Electrical Stimulation:         mins  45327 ( );    Untimed:  Electrical Stimulation:         mins  09551 ( );  Mechanical Traction:         mins  75092;     Timed Treatment:   27   mins   Total Treatment:     27   mins  Mayda Anne PT    Electronically singed 1/18/2022      [place] PT license: [id number]  Physical Therapist

## 2022-01-20 ENCOUNTER — TELEPHONE (OUTPATIENT)
Dept: INTERNAL MEDICINE | Facility: CLINIC | Age: 37
End: 2022-01-20

## 2022-01-27 ENCOUNTER — TREATMENT (OUTPATIENT)
Dept: PHYSICAL THERAPY | Facility: CLINIC | Age: 37
End: 2022-01-27

## 2022-01-27 DIAGNOSIS — M85.38 OSTEITIS CONDENSANS ILII: ICD-10-CM

## 2022-01-27 DIAGNOSIS — R10.2 PELVIC PAIN: Primary | ICD-10-CM

## 2022-01-27 PROCEDURE — 97014 ELECTRIC STIMULATION THERAPY: CPT | Performed by: PHYSICAL THERAPIST

## 2022-01-27 NOTE — PROGRESS NOTES
Physical Therapy Daily Treatment Note      Patient: Audra Rendon   : 1985  Referring practitioner: MINDA Luu  Date of Initial Visit: Type: THERAPY  Noted: 2021  Today's Date: 2022  Patient seen for 10 sessions           Subjective   Audra Rendon reports: having a lot of groin pain today and has been limping on left side.    Objective   See Exercise, Manual, and Modality Logs for complete treatment.       Assessment & Plan     Assessment  Prognosis details: Patient reports feeling better after modalities today.        Visit Diagnoses:    ICD-10-CM ICD-9-CM   1. Pelvic pain  R10.2 [id number]   2. Osteitis condensans ilii  M85.38 733.5       Progress per Plan of Care           Timed:  Manual Therapy:         mins  06379;  Therapeutic Exercise:         mins  33857;     Neuromuscular Nestor:        mins  37370;    Therapeutic Activity:          mins  39459;     Gait Training:           mins  39394;     Ultrasound:          mins  38742;    Electrical Stimulation:         mins  05164 ( );    Untimed:  Electrical Stimulation:   15      mins  92827 ( );  Mechanical Traction:         mins  02628;     Timed Treatment:      mins   Total Treatment:     15   mins  Mayda Anne PT    Electronically singed 2022      [place] PT license: [id number]  Physical Therapist

## 2022-02-01 ENCOUNTER — TELEPHONE (OUTPATIENT)
Dept: PHYSICAL THERAPY | Facility: CLINIC | Age: 37
End: 2022-02-01

## 2022-02-09 ENCOUNTER — TREATMENT (OUTPATIENT)
Dept: PHYSICAL THERAPY | Facility: CLINIC | Age: 37
End: 2022-02-09

## 2022-02-09 DIAGNOSIS — M85.38 OSTEITIS CONDENSANS ILII: ICD-10-CM

## 2022-02-09 DIAGNOSIS — R10.2 PELVIC PAIN: Primary | ICD-10-CM

## 2022-02-09 PROCEDURE — 97014 ELECTRIC STIMULATION THERAPY: CPT | Performed by: PHYSICAL THERAPIST

## 2022-02-09 PROCEDURE — 97110 THERAPEUTIC EXERCISES: CPT | Performed by: PHYSICAL THERAPIST

## 2022-02-09 NOTE — PROGRESS NOTES
Physical Therapy Daily Treatment Note      Patient: Audra Rendon   : 1985  Referring practitioner: MINDA Luu  Date of Initial Visit: Type: THERAPY  Noted: 2021  Today's Date: 2022  Patient seen for 11 sessions           Subjective   Audra Rendon reports: no pain; just fatigued today    Objective   See Exercise, Manual, and Modality Logs for complete treatment.       Assessment & Plan     Assessment  Prognosis details: Patient tolerated session without increase symptoms; states feeling better after modalities.         Visit Diagnoses:    ICD-10-CM ICD-9-CM   1. Pelvic pain  R10.2 [id number]   2. Osteitis condensans ilii  M85.38 733.5       Progress strengthening /stabilization /functional activity           Timed:  Manual Therapy:         mins  62058;  Therapeutic Exercise:    25     mins  41547;     Neuromuscular Nestor:        mins  99866;    Therapeutic Activity:          mins  71799;     Gait Training:           mins  39641;     Ultrasound:          mins  33900;    Electrical Stimulation:         mins  46514 ( );    Untimed:  Electrical Stimulation:    15     mins  88630 ( );  Mechanical Traction:         mins  69288;     Timed Treatment:   25  mins   Total Treatment:     40   mins  Mayda Anne PT    Electronically singed 2022      [place] PT license: [id number]  Physical Therapist

## 2022-02-16 ENCOUNTER — TELEPHONE (OUTPATIENT)
Dept: PHYSICAL THERAPY | Facility: CLINIC | Age: 37
End: 2022-02-16

## 2022-02-16 NOTE — TELEPHONE ENCOUNTER
Therapist called patient secondary to no show re-evaluation and informed patient that appt 2/17/22 cancelled and will re-evaluate on 2/21/22.

## 2022-03-01 ENCOUNTER — DOCUMENTATION (OUTPATIENT)
Dept: PHYSICAL THERAPY | Facility: CLINIC | Age: 37
End: 2022-03-01

## 2022-04-22 ENCOUNTER — OFFICE VISIT (OUTPATIENT)
Dept: INTERNAL MEDICINE | Facility: CLINIC | Age: 37
End: 2022-04-22

## 2022-04-22 VITALS
HEIGHT: 65 IN | DIASTOLIC BLOOD PRESSURE: 64 MMHG | BODY MASS INDEX: 22.66 KG/M2 | WEIGHT: 136 LBS | TEMPERATURE: 96.1 F | HEART RATE: 75 BPM | SYSTOLIC BLOOD PRESSURE: 118 MMHG | RESPIRATION RATE: 16 BRPM

## 2022-04-22 DIAGNOSIS — G43.709 CHRONIC MIGRAINE WITHOUT AURA WITHOUT STATUS MIGRAINOSUS, NOT INTRACTABLE: ICD-10-CM

## 2022-04-22 DIAGNOSIS — L65.0 TELOGEN EFFLUVIUM: Primary | ICD-10-CM

## 2022-04-22 DIAGNOSIS — K58.0 IRRITABLE BOWEL SYNDROME WITH DIARRHEA: Chronic | ICD-10-CM

## 2022-04-22 DIAGNOSIS — B35.4 TINEA CORPORIS: ICD-10-CM

## 2022-04-22 PROCEDURE — 99214 OFFICE O/P EST MOD 30 MIN: CPT | Performed by: NURSE PRACTITIONER

## 2022-04-22 RX ORDER — MULTIVIT-MIN/IRON/FOLIC ACID/K 18-600-40
50 CAPSULE ORAL DAILY
Qty: 90 CAPSULE | Refills: 1 | Status: SHIPPED | OUTPATIENT
Start: 2022-04-22 | End: 2023-03-10

## 2022-04-22 RX ORDER — SACCHAROMYCES BOULARDII 250 MG
250 CAPSULE ORAL 2 TIMES DAILY
Qty: 180 CAPSULE | Refills: 1 | Status: SHIPPED | OUTPATIENT
Start: 2022-04-22 | End: 2023-01-13

## 2022-04-22 RX ORDER — DEXTROAMPHETAMINE SACCHARATE, AMPHETAMINE ASPARTATE, DEXTROAMPHETAMINE SULFATE AND AMPHETAMINE SULFATE 1.25; 1.25; 1.25; 1.25 MG/1; MG/1; MG/1; MG/1
5 TABLET ORAL DAILY
COMMUNITY
Start: 2022-03-29 | End: 2023-01-13

## 2022-04-22 NOTE — PROGRESS NOTES
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  She reports that she notices it always in the same spot and it improves with use of Lotrimin.    Objective   Vital Signs:   /64 (BP Location: Right arm, Patient Position: Sitting, Cuff Size: Adult)   Pulse 75   Temp 96.1 °F (35.6 °C)   Resp 16   Ht 165.1 cm (65\")   Wt 61.7 kg (136 lb)   BMI 22.63 kg/m²     Physical Exam  Vitals and nursing note reviewed.   Constitutional:       General: She is not in acute distress.     Appearance: Normal appearance.   HENT:      Head: Normocephalic and atraumatic.      Right Ear: External ear normal.      Left Ear: External ear normal.      Nose: Nose normal.      Mouth/Throat:      Mouth: Mucous membranes are moist.   Eyes:      Conjunctiva/sclera: Conjunctivae normal.   Cardiovascular:      Rate and Rhythm: Normal rate and regular rhythm.      Pulses: Normal pulses.      Heart sounds: Normal heart sounds. No murmur heard.    No friction rub. No gallop.   Pulmonary:      Effort: Pulmonary effort is normal. No respiratory distress.      Breath sounds: No wheezing, rhonchi or rales.   Musculoskeletal:      Cervical back: Neck supple.   Skin:     General: Skin is warm and dry.      Comments: Thinning hair globally of scalp, no scarring of follicle, no rash   Neurological:      General: No focal deficit present.      Mental Status: She is alert and oriented to person, place, and " time.   Psychiatric:         Mood and Affect: Mood normal.         Behavior: Behavior normal.        Result Review :          Procedures      Assessment and Plan    Diagnoses and all orders for this visit:    1. Telogen effluvium (Primary)  Comments:  He has had extensive work-up with Derm, hematology and Endo.  She did not return for Endo follow-up.  She is agreeable to seeing Endo again at this time.  Orders:  -     Cancel: Ambulatory Referral to Endocrinology  -     Ambulatory Referral to Endocrinology    2. Chronic migraine without aura without status migrainosus, not intractable  Comments:  Well-controlled at present.  Continue with Fioricet as needed.    3. Tinea corporis  Comments:  Discussed clotrimazole appropriate as needed.  Encouraged her to use for 1 week following clearance of rash    4. Irritable bowel syndrome with diarrhea  Assessment & Plan:  We will try taking a daily probiotic.  He has had extensive work-up for this previously.      Other orders  -     Vitamin D, Cholecalciferol, 50 MCG (2000 UT) capsule; Take 1 capsule by mouth Daily.  Dispense: 90 capsule; Refill: 1  -     saccharomyces boulardii (Florastor) 250 MG capsule; Take 1 capsule by mouth 2 (Two) Times a Day.  Dispense: 180 capsule; Refill: 1            Follow Up   Return in about 3 months (around 7/22/2022).  Patient was given instructions and counseling regarding her condition or for health maintenance advice. Please see specific information pulled into the AVS if appropriate.

## 2022-04-25 ENCOUNTER — TELEPHONE (OUTPATIENT)
Dept: INTERNAL MEDICINE | Facility: CLINIC | Age: 37
End: 2022-04-25

## 2022-04-25 NOTE — TELEPHONE ENCOUNTER
Red rule verified and correct.    Referred to Endocrinology to see Dr DARRYN Verduzco.    They are requesting copies of her labs.  CMP  TSH  Free T4    Fax - [phone]

## 2023-01-12 ENCOUNTER — CLINICAL SUPPORT (OUTPATIENT)
Dept: INTERNAL MEDICINE | Facility: CLINIC | Age: 38
End: 2023-01-12
Payer: COMMERCIAL

## 2023-01-12 DIAGNOSIS — Z51.81 MEDICATION MONITORING ENCOUNTER: Primary | ICD-10-CM

## 2023-01-12 LAB
AMPHET+METHAMPHET UR QL: NEGATIVE
AMPHETAMINE INTERNAL CONTROL: ABNORMAL
AMPHETAMINES UR QL: NEGATIVE
BARBITURATE INTERNAL CONTROL: ABNORMAL
BARBITURATES UR QL SCN: NEGATIVE
BENZODIAZ UR QL SCN: NEGATIVE
BENZODIAZEPINE INTERNAL CONTROL: ABNORMAL
BUPRENORPHINE INTERNAL CONTROL: ABNORMAL
BUPRENORPHINE SERPL-MCNC: NEGATIVE NG/ML
CANNABINOIDS SERPL QL: NEGATIVE
COCAINE INTERNAL CONTROL: ABNORMAL
COCAINE UR QL: NEGATIVE
EXPIRATION DATE: ABNORMAL
Lab: ABNORMAL
MDMA (ECSTASY) INTERNAL CONTROL: ABNORMAL
MDMA UR QL SCN: NEGATIVE
METHADONE INTERNAL CONTROL: ABNORMAL
METHADONE UR QL SCN: NEGATIVE
METHAMPHETAMINE INTERNAL CONTROL: ABNORMAL
OPIATES INTERNAL CONTROL: ABNORMAL
OPIATES UR QL: POSITIVE
OXYCODONE INTERNAL CONTROL: ABNORMAL
OXYCODONE UR QL SCN: NEGATIVE
PCP UR QL SCN: NEGATIVE
PHENCYCLIDINE INTERNAL CONTROL: ABNORMAL
THC INTERNAL CONTROL: ABNORMAL

## 2023-01-12 PROCEDURE — 80305 DRUG TEST PRSMV DIR OPT OBS: CPT | Performed by: NURSE PRACTITIONER

## 2023-01-13 ENCOUNTER — OFFICE VISIT (OUTPATIENT)
Dept: INTERNAL MEDICINE | Facility: CLINIC | Age: 38
End: 2023-01-13
Payer: COMMERCIAL

## 2023-01-13 VITALS
OXYGEN SATURATION: 99 % | DIASTOLIC BLOOD PRESSURE: 78 MMHG | HEART RATE: 82 BPM | SYSTOLIC BLOOD PRESSURE: 110 MMHG | BODY MASS INDEX: 25.59 KG/M2 | WEIGHT: 153.8 LBS | TEMPERATURE: 97.3 F

## 2023-01-13 DIAGNOSIS — M25.571 ARTHRALGIA OF RIGHT FOOT: ICD-10-CM

## 2023-01-13 DIAGNOSIS — M25.511 ACUTE PAIN OF RIGHT SHOULDER: ICD-10-CM

## 2023-01-13 DIAGNOSIS — R63.5 WEIGHT GAIN: Primary | ICD-10-CM

## 2023-01-13 DIAGNOSIS — G43.709 CHRONIC MIGRAINE WITHOUT AURA WITHOUT STATUS MIGRAINOSUS, NOT INTRACTABLE: ICD-10-CM

## 2023-01-13 LAB
ALBUMIN SERPL-MCNC: 4.6 G/DL (ref 3.5–5.2)
ALBUMIN/GLOB SERPL: 2.1 G/DL
ALP SERPL-CCNC: 72 U/L (ref 39–117)
ALT SERPL W P-5'-P-CCNC: 60 U/L (ref 1–33)
ANION GAP SERPL CALCULATED.3IONS-SCNC: 6.3 MMOL/L (ref 5–15)
AST SERPL-CCNC: 41 U/L (ref 1–32)
BASOPHILS # BLD AUTO: 0.03 10*3/MM3 (ref 0–0.2)
BASOPHILS NFR BLD AUTO: 0.6 % (ref 0–1.5)
BILIRUB SERPL-MCNC: 0.4 MG/DL (ref 0–1.2)
BUN SERPL-MCNC: 9 MG/DL (ref 6–20)
BUN/CREAT SERPL: 10.8 (ref 7–25)
CALCIUM SPEC-SCNC: 9.1 MG/DL (ref 8.6–10.5)
CHLORIDE SERPL-SCNC: 101 MMOL/L (ref 98–107)
CO2 SERPL-SCNC: 30.7 MMOL/L (ref 22–29)
CREAT SERPL-MCNC: 0.83 MG/DL (ref 0.57–1)
DEPRECATED RDW RBC AUTO: 36.3 FL (ref 37–54)
EGFRCR SERPLBLD CKD-EPI 2021: 92.7 ML/MIN/1.73
EOSINOPHIL # BLD AUTO: 0.16 10*3/MM3 (ref 0–0.4)
EOSINOPHIL NFR BLD AUTO: 3.4 % (ref 0.3–6.2)
ERYTHROCYTE [DISTWIDTH] IN BLOOD BY AUTOMATED COUNT: 11.9 % (ref 12.3–15.4)
GLOBULIN UR ELPH-MCNC: 2.2 GM/DL
GLUCOSE SERPL-MCNC: 80 MG/DL (ref 65–99)
HCT VFR BLD AUTO: 40.9 % (ref 34–46.6)
HGB BLD-MCNC: 13.7 G/DL (ref 12–15.9)
IMM GRANULOCYTES # BLD AUTO: 0.03 10*3/MM3 (ref 0–0.05)
IMM GRANULOCYTES NFR BLD AUTO: 0.6 % (ref 0–0.5)
LYMPHOCYTES # BLD AUTO: 1.67 10*3/MM3 (ref 0.7–3.1)
LYMPHOCYTES NFR BLD AUTO: 35.4 % (ref 19.6–45.3)
MCH RBC QN AUTO: 28.5 PG (ref 26.6–33)
MCHC RBC AUTO-ENTMCNC: 33.5 G/DL (ref 31.5–35.7)
MCV RBC AUTO: 85.2 FL (ref 79–97)
MONOCYTES # BLD AUTO: 0.36 10*3/MM3 (ref 0.1–0.9)
MONOCYTES NFR BLD AUTO: 7.6 % (ref 5–12)
NEUTROPHILS NFR BLD AUTO: 2.47 10*3/MM3 (ref 1.7–7)
NEUTROPHILS NFR BLD AUTO: 52.4 % (ref 42.7–76)
NRBC BLD AUTO-RTO: 0 /100 WBC (ref 0–0.2)
PLATELET # BLD AUTO: 282 10*3/MM3 (ref 140–450)
PMV BLD AUTO: 10.4 FL (ref 6–12)
POTASSIUM SERPL-SCNC: 4.6 MMOL/L (ref 3.5–5.2)
PROT SERPL-MCNC: 6.8 G/DL (ref 6–8.5)
RBC # BLD AUTO: 4.8 10*6/MM3 (ref 3.77–5.28)
SODIUM SERPL-SCNC: 138 MMOL/L (ref 136–145)
TSH SERPL DL<=0.05 MIU/L-ACNC: 2.91 UIU/ML (ref 0.27–4.2)
URATE SERPL-MCNC: 4.9 MG/DL (ref 2.4–5.7)
WBC NRBC COR # BLD: 4.72 10*3/MM3 (ref 3.4–10.8)

## 2023-01-13 PROCEDURE — 99214 OFFICE O/P EST MOD 30 MIN: CPT | Performed by: NURSE PRACTITIONER

## 2023-01-13 PROCEDURE — 85025 COMPLETE CBC W/AUTO DIFF WBC: CPT | Performed by: NURSE PRACTITIONER

## 2023-01-13 PROCEDURE — 84550 ASSAY OF BLOOD/URIC ACID: CPT | Performed by: NURSE PRACTITIONER

## 2023-01-13 PROCEDURE — 84443 ASSAY THYROID STIM HORMONE: CPT | Performed by: NURSE PRACTITIONER

## 2023-01-13 PROCEDURE — 80053 COMPREHEN METABOLIC PANEL: CPT | Performed by: NURSE PRACTITIONER

## 2023-01-13 RX ORDER — BUTALBITAL, ACETAMINOPHEN AND CAFFEINE 50; 325; 40 MG/1; MG/1; MG/1
1 TABLET ORAL DAILY PRN
Qty: 30 TABLET | Refills: 0 | Status: SHIPPED | OUTPATIENT
Start: 2023-01-13

## 2023-01-13 RX ORDER — RIMEGEPANT SULFATE 75 MG/75MG
75 TABLET, ORALLY DISINTEGRATING ORAL EVERY OTHER DAY
Qty: 16 TABLET | Refills: 2 | Status: SHIPPED | OUTPATIENT
Start: 2023-01-13 | End: 2023-03-10

## 2023-01-13 RX ORDER — ALBUTEROL SULFATE 90 UG/1
2 AEROSOL, METERED RESPIRATORY (INHALATION) EVERY 4 HOURS PRN
Qty: 11 G | Refills: 2 | Status: SHIPPED | OUTPATIENT
Start: 2023-01-13 | End: 2023-01-13

## 2023-01-13 NOTE — PROGRESS NOTES
Chief Complaint  Med Refill (Has been out of migraine medicine ), Shoulder Pain (Could be shoulder/neck 4 months cant sleep on right side or drive range of motion non existent pulling feeling in it), and Weight Gain (Gained about 20 pounds in 4 months )    Subjective          Audra Rendon presents to [facility]  History of Present Illness  Migraines-out of fioricet currently  Moved to [place] over last summer. Recently moved back  [facility] yesterday. Positive for hydrocodone due to recent surgery, oral surgery  Previously took topamax and had side effects  Taking fioricet prn  Also tried propanolol previously without improvement    Shoulder/neck pain for the last 4 mths. Pain with lying on right side, driving. Decreased ROM  She reports this started after waking one day. Denies injury initially. She reports hearing a pop about 1 wk ago with radiation of pain down her arm. She is also concerned that this may be coming from her neck. Reports history of neck issues.    Right 2nd toe pain that started severe mths ago. She reports severe pain, swelling that resolved on its own after a week. She has had return of pain x2 other episodes.     Weight gain-has gained weight over the last 4 mths  She reports that she is eating more but not eating unhealthy   Not binging on sweets  She is not working currently  Feeling depressed  She has appt with Debra 1/24 for adhd and depression  Objective   Vital Signs:   /78 (BP Location: Left arm, Patient Position: Sitting, Cuff Size: Adult)   Pulse 82   Temp 97.3 °F (36.3 °C) (Temporal)   Wt 69.8 kg (153 lb 12.8 oz)   SpO2 99%   BMI 25.59 kg/m²     Physical Exam   Result Review :          Procedures      Assessment and Plan    Diagnoses and all orders for this visit:    1. Weight gain (Primary)  Comments:  Secondary to being off Vyvanse, change in eating habits and activity level.  Discussed importance of resuming healthy  lifestyle habits  Orders:  -     TSH  -     CBC Auto Differential  -     Comprehensive Metabolic Panel    2. Chronic migraine without aura without status migrainosus, not intractable  Comments:  UDS reviewed yesterday.  Sending in Fioricet.  New contract signed.  Mervin reviewed.  We will also try to get Nurtec covered    3. Acute pain of right shoulder  Comments:  Likely nerve impingement syndrome.  We will try PT  Orders:  -     Ambulatory Referral to Physical Therapy Evaluate and treat    4. Arthralgia of right foot  Comments:  Possibly gout.  Checking uric acid to get baseline.  No pain today.  Orders:  -     Uric acid    Other orders  -     Discontinue: albuterol sulfate  (90 Base) MCG/ACT inhaler; Inhale 2 puffs Every 4 (Four) Hours As Needed for Wheezing.  Dispense: 11 g; Refill: 2  -     butalbital-acetaminophen-caffeine (Fioricet) -40 MG per tablet; Take 1 tablet by mouth Daily As Needed for Headache. May repeat in 2 hrs if headache not improved. Max 2 in 24 hr period  Dispense: 30 tablet; Refill: 0  -     Rimegepant Sulfate (Nurtec) 75 MG tablet dispersible tablet; Take 1 tablet by mouth Every Other Day.  Dispense: 16 tablet; Refill: 2              Follow Up   Return in about 8 weeks (around 3/10/2023).  Patient was given instructions and counseling regarding her condition or for health maintenance advice. Please see specific information pulled into the AVS if appropriate.

## 2023-01-17 ENCOUNTER — PRIOR AUTHORIZATION (OUTPATIENT)
Dept: INTERNAL MEDICINE | Facility: CLINIC | Age: 38
End: 2023-01-17
Payer: COMMERCIAL

## 2023-01-19 ENCOUNTER — PRIOR AUTHORIZATION (OUTPATIENT)
Dept: INTERNAL MEDICINE | Facility: CLINIC | Age: 38
End: 2023-01-19
Payer: COMMERCIAL

## 2023-01-19 NOTE — TELEPHONE ENCOUNTER
The request has been approved. The authorization is effective for a maximum of 12 fills from 01/17/2023 to 01/16/2024, as long as the member is enrolled in their current health plan. The request was approved as submitted. This request has been approved for a quantity limit of 2 tablets per day. A written notification letter will follow with additional details.

## 2023-01-19 NOTE — TELEPHONE ENCOUNTER
The request has been approved. The authorization is effective for a maximum of 3 fills from 01/19/2023 to 04/18/2023, as long as the member is enrolled in their current health plan. The request was approved as submitted. A written notification letter will follow with additional details.

## 2023-02-01 ENCOUNTER — OFFICE VISIT (OUTPATIENT)
Dept: OBSTETRICS AND GYNECOLOGY | Facility: CLINIC | Age: 38
End: 2023-02-01
Payer: COMMERCIAL

## 2023-02-01 VITALS
BODY MASS INDEX: 25.49 KG/M2 | HEIGHT: 65 IN | WEIGHT: 153 LBS | SYSTOLIC BLOOD PRESSURE: 119 MMHG | DIASTOLIC BLOOD PRESSURE: 80 MMHG

## 2023-02-01 DIAGNOSIS — Z01.419 WOMEN'S ANNUAL ROUTINE GYNECOLOGICAL EXAMINATION: Primary | ICD-10-CM

## 2023-02-01 PROBLEM — B37.0 THRUSH: Status: RESOLVED | Noted: 2021-09-21 | Resolved: 2023-02-01

## 2023-02-01 PROCEDURE — 3008F BODY MASS INDEX DOCD: CPT | Performed by: OBSTETRICS & GYNECOLOGY

## 2023-02-01 PROCEDURE — G0123 SCREEN CERV/VAG THIN LAYER: HCPCS | Performed by: OBSTETRICS & GYNECOLOGY

## 2023-02-01 PROCEDURE — 2014F MENTAL STATUS ASSESS: CPT | Performed by: OBSTETRICS & GYNECOLOGY

## 2023-02-01 PROCEDURE — 99395 PREV VISIT EST AGE 18-39: CPT | Performed by: OBSTETRICS & GYNECOLOGY

## 2023-02-01 RX ORDER — ALBUTEROL SULFATE 90 UG/1
2 AEROSOL, METERED RESPIRATORY (INHALATION) EVERY 4 HOURS PRN
COMMUNITY
Start: 2023-01-13 | End: 2023-03-10 | Stop reason: SDUPTHER

## 2023-02-01 RX ORDER — LISDEXAMFETAMINE DIMESYLATE 70 MG/1
70 CAPSULE ORAL EVERY MORNING
COMMUNITY
Start: 2023-01-24

## 2023-02-01 NOTE — PROGRESS NOTES
"Well Woman Visit    CC: WWE     HPI:   37 y.o. who presents for a well woman exam. No problems. History of hysterectomy. Ovarian conservation. No urinary incontinence. History of mid urethral sling.  Denies any urinary incontinence or issues with the sling.    History: PMHx, Meds, Allergies, PSHx, Social Hx, and POBHx all reviewed and updated.      /80 (BP Location: Right arm, Patient Position: Sitting, Cuff Size: Adult)   Ht 165.1 cm (65\")   Wt 69.4 kg (153 lb)   LMP 05/24/2017   BMI 25.46 kg/m²     Physical Exam  Vitals and nursing note reviewed. Exam conducted with a chaperone present.   Constitutional:       General: She is not in acute distress.     Appearance: Normal appearance. She is not ill-appearing.   HENT:      Head: Normocephalic and atraumatic.   Eyes:      Extraocular Movements: Extraocular movements intact.   Neck:      Thyroid: No thyroid mass or thyromegaly.   Chest:   Breasts:     Breasts are symmetrical.      Right: Normal. No swelling, bleeding, inverted nipple, mass, nipple discharge, skin change or tenderness.      Left: Normal. No swelling, bleeding, inverted nipple, mass, nipple discharge, skin change or tenderness.   Abdominal:      General: Abdomen is flat. There is no distension.      Palpations: Abdomen is soft. There is no mass.      Tenderness: There is no abdominal tenderness. There is no guarding or rebound.      Hernia: No hernia is present. There is no hernia in the left inguinal area or right inguinal area.   Genitourinary:     General: Normal vulva.      Exam position: Lithotomy position.      Pubic Area: No rash or pubic lice.       Labia:         Right: No rash, tenderness, lesion or injury.         Left: No rash, tenderness, lesion or injury.       Urethra: No prolapse, urethral pain, urethral swelling or urethral lesion.      Vagina: Normal. No vaginal discharge, tenderness or prolapsed vaginal walls.      Uterus: Absent.       Adnexa: Right adnexa normal and left " adnexa normal.      Comments: The uterus and cervix are surgically absent  Lymphadenopathy:      Upper Body:      Right upper body: No supraclavicular or axillary adenopathy.      Left upper body: No supraclavicular or axillary adenopathy.   Skin:     General: Skin is warm and dry.   Neurological:      Mental Status: She is alert and oriented to person, place, and time.   Psychiatric:         Mood and Affect: Mood normal.         Behavior: Behavior normal.         Thought Content: Thought content normal.         ASSESSMENT AND PLAN:    Diagnoses and all orders for this visit:    1. Women's annual routine gynecological examination (Primary)  Assessment & Plan:  Pap  Recommend daily multivitamin with folic acid  Mammograms at age 40    Orders:  -     Cancel: IGP,rfx Aptima HPV All Pth  -     IGP,rfx Aptima HPV All Pth        Preventative:   Recommend FLU vaccine this season, R/B discussed  Recommend COVID vaccine, R/B discussed    She understands the importance of having any ordered tests to be performed in a timely fashion.  The risks of not performing them include, but are not limited to, advanced cancer stages, bone loss from osteoporosis and/or subsequent increase in morbidity and/or mortality.  She is encouraged to review her results online and/or contact or office if she has questions.     Follow Up:  Return for Annual physical.    Florin Carolina MD  02/01/2023

## 2023-02-07 LAB
CONV .: NORMAL
CYTOLOGIST CVX/VAG CYTO: NORMAL
CYTOLOGY CVX/VAG DOC CYTO: NORMAL
CYTOLOGY CVX/VAG DOC THIN PREP: NORMAL
DX ICD CODE: NORMAL
HIV 1 & 2 AB SER-IMP: NORMAL
OTHER STN SPEC: NORMAL
STAT OF ADQ CVX/VAG CYTO-IMP: NORMAL

## 2023-03-10 ENCOUNTER — OFFICE VISIT (OUTPATIENT)
Dept: INTERNAL MEDICINE | Facility: CLINIC | Age: 38
End: 2023-03-10
Payer: COMMERCIAL

## 2023-03-10 VITALS
WEIGHT: 143 LBS | HEART RATE: 72 BPM | RESPIRATION RATE: 18 BRPM | DIASTOLIC BLOOD PRESSURE: 60 MMHG | TEMPERATURE: 97.6 F | SYSTOLIC BLOOD PRESSURE: 112 MMHG | HEIGHT: 65 IN | BODY MASS INDEX: 23.82 KG/M2 | OXYGEN SATURATION: 98 %

## 2023-03-10 DIAGNOSIS — F41.8 DEPRESSION WITH ANXIETY: Chronic | ICD-10-CM

## 2023-03-10 DIAGNOSIS — R53.83 FATIGUE, UNSPECIFIED TYPE: Primary | ICD-10-CM

## 2023-03-10 DIAGNOSIS — G43.709 CHRONIC MIGRAINE WITHOUT AURA WITHOUT STATUS MIGRAINOSUS, NOT INTRACTABLE: ICD-10-CM

## 2023-03-10 DIAGNOSIS — J30.9 ALLERGIC RHINITIS, UNSPECIFIED SEASONALITY, UNSPECIFIED TRIGGER: ICD-10-CM

## 2023-03-10 DIAGNOSIS — M54.2 NECK PAIN: ICD-10-CM

## 2023-03-10 PROCEDURE — 99214 OFFICE O/P EST MOD 30 MIN: CPT | Performed by: NURSE PRACTITIONER

## 2023-03-10 RX ORDER — ALBUTEROL SULFATE 90 UG/1
2 AEROSOL, METERED RESPIRATORY (INHALATION) EVERY 4 HOURS PRN
Qty: 8 G | Refills: 1 | Status: SHIPPED | OUTPATIENT
Start: 2023-03-10

## 2023-03-10 RX ORDER — VORTIOXETINE 5 MG/1
1 TABLET, FILM COATED ORAL DAILY
COMMUNITY
Start: 2023-02-23

## 2023-03-10 RX ORDER — LEVOCETIRIZINE DIHYDROCHLORIDE 5 MG/1
5 TABLET, FILM COATED ORAL EVERY EVENING
Qty: 90 TABLET | Refills: 1 | Status: SHIPPED | OUTPATIENT
Start: 2023-03-10

## 2023-03-10 RX ORDER — ONDANSETRON 4 MG/1
1 TABLET, FILM COATED ORAL EVERY 12 HOURS SCHEDULED
COMMUNITY
Start: 2023-02-23

## 2023-03-10 NOTE — ASSESSMENT & PLAN NOTE
Reports recent improvement with switching to Trintellix.  She will continue to follow-up with psych as scheduled.

## 2023-03-10 NOTE — PROGRESS NOTES
"Chief Complaint  Migraine (8 week follow up ), Weight Gain, and Shoulder Pain (Right shoulder- doing PT)    Subjective          Audra Rendon presents to [facility]  History of Present Illness  Has lost about 10 lbs, eating better and being more active.    Migraines-better, she has only had 2-4 times since last visit    Neck and shoulder pain is better with PT, plans to continue at this time    Stopped Wellbutrin after having panic attacks  Doing well with trintellix    Feeling fatigued in the evening.   Seasonal allergies-no currently taking any medications  She reports not taking the vyvanse every day  Hydrating well  Losing weight  Walking more at work  Recent labs reviewed    Recently got zeoman injection in forehead (similar to botox)  Objective   Vital Signs:   /60 (BP Location: Right arm, Patient Position: Sitting, Cuff Size: Adult)   Pulse 72   Temp 97.6 °F (36.4 °C)   Resp 18   Ht 165.1 cm (65\")   Wt 64.9 kg (143 lb)   SpO2 98%   BMI 23.80 kg/m²     Physical Exam  Vitals and nursing note reviewed.   Constitutional:       General: She is not in acute distress.     Appearance: Normal appearance.   HENT:      Head: Normocephalic and atraumatic.      Right Ear: External ear normal.      Left Ear: External ear normal.      Nose: Nose normal.      Mouth/Throat:      Mouth: Mucous membranes are moist.   Eyes:      Conjunctiva/sclera: Conjunctivae normal.   Cardiovascular:      Rate and Rhythm: Normal rate and regular rhythm.      Pulses: Normal pulses.      Heart sounds: Normal heart sounds. No murmur heard.    No friction rub. No gallop.   Pulmonary:      Effort: Pulmonary effort is normal. No respiratory distress.      Breath sounds: No wheezing, rhonchi or rales.   Musculoskeletal:      Cervical back: Neck supple.   Skin:     General: Skin is warm and dry.   Neurological:      General: No focal deficit present.      Mental Status: She is alert " and oriented to person, place, and time.   Psychiatric:         Mood and Affect: Mood normal.         Behavior: Behavior normal.        Result Review :          Procedures      Assessment and Plan    Diagnoses and all orders for this visit:    1. Fatigue, unspecified type (Primary)  Comments:  Reviewed previous labs.  Discussed chronic fatigue syndrome versus multiple factors likely affecting fatigue--vyvanse use, allergies, weight change    2. Allergic rhinitis, unspecified seasonality, unspecified trigger  Comments:  Discussed restarting Xyzal.  May consider adding Flonase if not improved.    3. Chronic migraine without aura without status migrainosus, not intractable  Comments:  Well-controlled with current medications.  Continue at this time.    4. Depression with anxiety  Assessment & Plan:  Reports recent improvement with switching to Trintellix.  She will continue to follow-up with psych as scheduled.      5. Neck pain  Comments:  She will continue on with physical therapy for neck pain and shoulder pain at this time.    Other orders  -     Ventolin  (90 Base) MCG/ACT inhaler; Inhale 2 puffs Every 4 (Four) Hours As Needed for Wheezing. for wheezing  Dispense: 8 g; Refill: 1  -     levocetirizine (XYZAL) 5 MG tablet; Take 1 tablet by mouth Every Evening.  Dispense: 90 tablet; Refill: 1            Follow Up   Return in about 3 months (around 6/10/2023) for Annual physical.  Patient was given instructions and counseling regarding her condition or for health maintenance advice. Please see specific information pulled into the AVS if appropriate.

## 2023-04-07 ENCOUNTER — PRIOR AUTHORIZATION (OUTPATIENT)
Dept: INTERNAL MEDICINE | Facility: CLINIC | Age: 38
End: 2023-04-07
Payer: COMMERCIAL

## 2023-04-07 NOTE — TELEPHONE ENCOUNTER
The request has been approved. The authorization is effective for a maximum of 12 fills from 04/07/2023 to 04/06/2024,

## 2023-04-19 DIAGNOSIS — M54.2 NECK PAIN: Primary | ICD-10-CM

## 2023-08-22 ENCOUNTER — OFFICE VISIT (OUTPATIENT)
Dept: INTERNAL MEDICINE | Facility: CLINIC | Age: 38
End: 2023-08-22
Payer: COMMERCIAL

## 2023-08-22 VITALS
RESPIRATION RATE: 18 BRPM | TEMPERATURE: 97.2 F | WEIGHT: 144 LBS | HEIGHT: 65 IN | OXYGEN SATURATION: 99 % | HEART RATE: 73 BPM | BODY MASS INDEX: 23.99 KG/M2 | DIASTOLIC BLOOD PRESSURE: 60 MMHG | SYSTOLIC BLOOD PRESSURE: 106 MMHG

## 2023-08-22 DIAGNOSIS — R63.5 WEIGHT GAIN: ICD-10-CM

## 2023-08-22 DIAGNOSIS — G43.809 OTHER MIGRAINE WITHOUT STATUS MIGRAINOSUS, NOT INTRACTABLE: ICD-10-CM

## 2023-08-22 DIAGNOSIS — Z83.49 FAMILY HISTORY OF HASHIMOTO THYROIDITIS: ICD-10-CM

## 2023-08-22 DIAGNOSIS — L65.9 HAIR LOSS: ICD-10-CM

## 2023-08-22 DIAGNOSIS — Z51.81 MEDICATION MONITORING ENCOUNTER: ICD-10-CM

## 2023-08-22 DIAGNOSIS — Z11.1 SCREENING-PULMONARY TB: ICD-10-CM

## 2023-08-22 DIAGNOSIS — Z00.00 ANNUAL PHYSICAL EXAM: Primary | ICD-10-CM

## 2023-08-22 LAB
ALBUMIN SERPL-MCNC: 4.9 G/DL (ref 3.5–5.2)
ALBUMIN/GLOB SERPL: 2 G/DL
ALP SERPL-CCNC: 67 U/L (ref 39–117)
ALT SERPL W P-5'-P-CCNC: 16 U/L (ref 1–33)
AMPHET+METHAMPHET UR QL: POSITIVE
AMPHETAMINE INTERNAL CONTROL: ABNORMAL
AMPHETAMINES UR QL: NEGATIVE
ANION GAP SERPL CALCULATED.3IONS-SCNC: 11.7 MMOL/L (ref 5–15)
AST SERPL-CCNC: 19 U/L (ref 1–32)
BARBITURATE INTERNAL CONTROL: ABNORMAL
BARBITURATES UR QL SCN: NEGATIVE
BASOPHILS # BLD AUTO: 0.03 10*3/MM3 (ref 0–0.2)
BASOPHILS NFR BLD AUTO: 0.6 % (ref 0–1.5)
BENZODIAZ UR QL SCN: NEGATIVE
BENZODIAZEPINE INTERNAL CONTROL: ABNORMAL
BILIRUB SERPL-MCNC: 0.8 MG/DL (ref 0–1.2)
BUN SERPL-MCNC: 8 MG/DL (ref 6–20)
BUN/CREAT SERPL: 9.6 (ref 7–25)
BUPRENORPHINE INTERNAL CONTROL: ABNORMAL
BUPRENORPHINE SERPL-MCNC: NEGATIVE NG/ML
CALCIUM SPEC-SCNC: 10 MG/DL (ref 8.6–10.5)
CANNABINOIDS SERPL QL: NEGATIVE
CHLORIDE SERPL-SCNC: 105 MMOL/L (ref 98–107)
CHOLEST SERPL-MCNC: 181 MG/DL (ref 0–200)
CO2 SERPL-SCNC: 24.3 MMOL/L (ref 22–29)
COCAINE INTERNAL CONTROL: ABNORMAL
COCAINE UR QL: NEGATIVE
CREAT SERPL-MCNC: 0.83 MG/DL (ref 0.57–1)
DEPRECATED RDW RBC AUTO: 36.3 FL (ref 37–54)
EGFRCR SERPLBLD CKD-EPI 2021: 92.1 ML/MIN/1.73
EOSINOPHIL # BLD AUTO: 0.08 10*3/MM3 (ref 0–0.4)
EOSINOPHIL NFR BLD AUTO: 1.6 % (ref 0.3–6.2)
ERYTHROCYTE [DISTWIDTH] IN BLOOD BY AUTOMATED COUNT: 12 % (ref 12.3–15.4)
EXPIRATION DATE: ABNORMAL
GLOBULIN UR ELPH-MCNC: 2.5 GM/DL
GLUCOSE SERPL-MCNC: 84 MG/DL (ref 65–99)
HCT VFR BLD AUTO: 44.9 % (ref 34–46.6)
HDLC SERPL-MCNC: 65 MG/DL (ref 40–60)
HGB BLD-MCNC: 15 G/DL (ref 12–15.9)
IMM GRANULOCYTES # BLD AUTO: 0.01 10*3/MM3 (ref 0–0.05)
IMM GRANULOCYTES NFR BLD AUTO: 0.2 % (ref 0–0.5)
LDLC SERPL CALC-MCNC: 105 MG/DL (ref 0–100)
LDLC/HDLC SERPL: 1.62 {RATIO}
LYMPHOCYTES # BLD AUTO: 1.45 10*3/MM3 (ref 0.7–3.1)
LYMPHOCYTES NFR BLD AUTO: 28.3 % (ref 19.6–45.3)
Lab: ABNORMAL
MCH RBC QN AUTO: 27.9 PG (ref 26.6–33)
MCHC RBC AUTO-ENTMCNC: 33.4 G/DL (ref 31.5–35.7)
MCV RBC AUTO: 83.6 FL (ref 79–97)
MDMA (ECSTASY) INTERNAL CONTROL: ABNORMAL
MDMA UR QL SCN: NEGATIVE
METHADONE INTERNAL CONTROL: ABNORMAL
METHADONE UR QL SCN: NEGATIVE
METHAMPHETAMINE INTERNAL CONTROL: ABNORMAL
MONOCYTES # BLD AUTO: 0.28 10*3/MM3 (ref 0.1–0.9)
MONOCYTES NFR BLD AUTO: 5.5 % (ref 5–12)
NEUTROPHILS NFR BLD AUTO: 3.27 10*3/MM3 (ref 1.7–7)
NEUTROPHILS NFR BLD AUTO: 63.8 % (ref 42.7–76)
NRBC BLD AUTO-RTO: 0 /100 WBC (ref 0–0.2)
OPIATES INTERNAL CONTROL: ABNORMAL
OPIATES UR QL: NEGATIVE
OXYCODONE INTERNAL CONTROL: ABNORMAL
OXYCODONE UR QL SCN: NEGATIVE
PCP UR QL SCN: NEGATIVE
PHENCYCLIDINE INTERNAL CONTROL: ABNORMAL
PLATELET # BLD AUTO: 308 10*3/MM3 (ref 140–450)
PMV BLD AUTO: 10 FL (ref 6–12)
POTASSIUM SERPL-SCNC: 5 MMOL/L (ref 3.5–5.2)
PROT SERPL-MCNC: 7.4 G/DL (ref 6–8.5)
RBC # BLD AUTO: 5.37 10*6/MM3 (ref 3.77–5.28)
SODIUM SERPL-SCNC: 141 MMOL/L (ref 136–145)
T3FREE SERPL-MCNC: 3.21 PG/ML (ref 2–4.4)
T4 FREE SERPL-MCNC: 1.22 NG/DL (ref 0.93–1.7)
TESTOST SERPL-MCNC: 25 NG/DL (ref 8.4–48.1)
THC INTERNAL CONTROL: ABNORMAL
TRIGL SERPL-MCNC: 54 MG/DL (ref 0–150)
TSH SERPL DL<=0.05 MIU/L-ACNC: 1.09 UIU/ML (ref 0.27–4.2)
VLDLC SERPL-MCNC: 11 MG/DL (ref 5–40)
WBC NRBC COR # BLD: 5.12 10*3/MM3 (ref 3.4–10.8)

## 2023-08-22 PROCEDURE — 84443 ASSAY THYROID STIM HORMONE: CPT | Performed by: NURSE PRACTITIONER

## 2023-08-22 PROCEDURE — 80061 LIPID PANEL: CPT | Performed by: NURSE PRACTITIONER

## 2023-08-22 PROCEDURE — 85025 COMPLETE CBC W/AUTO DIFF WBC: CPT | Performed by: NURSE PRACTITIONER

## 2023-08-22 PROCEDURE — 84439 ASSAY OF FREE THYROXINE: CPT | Performed by: NURSE PRACTITIONER

## 2023-08-22 PROCEDURE — 84403 ASSAY OF TOTAL TESTOSTERONE: CPT | Performed by: NURSE PRACTITIONER

## 2023-08-22 PROCEDURE — 80053 COMPREHEN METABOLIC PANEL: CPT | Performed by: NURSE PRACTITIONER

## 2023-08-22 PROCEDURE — 99395 PREV VISIT EST AGE 18-39: CPT | Performed by: NURSE PRACTITIONER

## 2023-08-22 PROCEDURE — 3008F BODY MASS INDEX DOCD: CPT | Performed by: NURSE PRACTITIONER

## 2023-08-22 PROCEDURE — 86800 THYROGLOBULIN ANTIBODY: CPT | Performed by: NURSE PRACTITIONER

## 2023-08-22 PROCEDURE — 84481 FREE ASSAY (FT-3): CPT | Performed by: NURSE PRACTITIONER

## 2023-08-22 PROCEDURE — 86580 TB INTRADERMAL TEST: CPT | Performed by: NURSE PRACTITIONER

## 2023-08-22 PROCEDURE — 86376 MICROSOMAL ANTIBODY EACH: CPT | Performed by: NURSE PRACTITIONER

## 2023-08-22 PROCEDURE — 2014F MENTAL STATUS ASSESS: CPT | Performed by: NURSE PRACTITIONER

## 2023-08-22 PROCEDURE — 80305 DRUG TEST PRSMV DIR OPT OBS: CPT | Performed by: NURSE PRACTITIONER

## 2023-08-22 RX ORDER — BUTALBITAL, ACETAMINOPHEN AND CAFFEINE 50; 325; 40 MG/1; MG/1; MG/1
1 TABLET ORAL DAILY PRN
Qty: 30 TABLET | Refills: 0 | Status: SHIPPED | OUTPATIENT
Start: 2023-08-22

## 2023-08-22 NOTE — PROGRESS NOTES
"Chief Complaint  Annual Exam    Subjective          Audra Rendon presents to [facility]  History of Present Illness  Annual physical  Needing physical for work  Also needing tb skin test  Migraines-improved since cutting down on caffeine/diet mtn dew, taking fioricet infrequently    Reports ongoing conecern for thin hair, cyclic acne, waxing/waning libido  Subclinical hypothyroid-previously saw endo  Last pap in march  Labs checked recently at the [place] office where she is employed, reviewed    Objective   Vital Signs:   /60 (BP Location: Left arm, Patient Position: Sitting, Cuff Size: Adult)   Pulse 73   Temp 97.2 øF (36.2 øC)   Resp 18   Ht 165.1 cm (65\")   Wt 65.3 kg (144 lb)   SpO2 99%   BMI 23.96 kg/mý     Physical Exam  Vitals and nursing note reviewed.   Constitutional:       General: She is not in acute distress.     Appearance: Normal appearance.   HENT:      Head: Normocephalic and atraumatic.      Right Ear: External ear normal.      Left Ear: External ear normal.      Nose: Nose normal.      Mouth/Throat:      Mouth: Mucous membranes are moist.   Eyes:      Conjunctiva/sclera: Conjunctivae normal.   Cardiovascular:      Rate and Rhythm: Normal rate and regular rhythm.      Pulses: Normal pulses.      Heart sounds: Normal heart sounds. No murmur heard.    No friction rub. No gallop.   Pulmonary:      Effort: Pulmonary effort is normal. No respiratory distress.      Breath sounds: No wheezing, rhonchi or rales.   Musculoskeletal:      Cervical back: Neck supple.      Right lower leg: No edema.      Left lower leg: No edema.   Skin:     General: Skin is warm and dry.   Neurological:      General: No focal deficit present.      Mental Status: She is alert and oriented to person, place, and time.   Psychiatric:         Mood and Affect: Mood normal.         Behavior: Behavior normal.      Result Review :          Procedures      Assessment " and Plan    Diagnoses and all orders for this visit:    1. Annual physical exam (Primary)  -     Comprehensive Metabolic Panel  -     CBC & Differential  -     TSH  -     Lipid Panel    2. Hair loss  -     Lipid Panel  -     T3, Free  -     T4, Free  -     Thyroid Antibodies  -     Testosterone    3. Family history of Hashimoto thyroiditis  -     Lipid Panel  -     T3, Free  -     T4, Free  -     Thyroid Antibodies    4. Weight gain  -     Testosterone    5. Other migraine without status migrainosus, not intractable  Comments:  Significantly improved with dietary changes.  Continue Fioricet at this time.  UDS and controlled substance agreement updated today.  Mervin reviewed.  Orders:  -     POC Urine Drug Screen [facility]    6. Medication monitoring encounter  -     POC Urine Drug Screen [facility]    7. Screening-pulmonary TB  -     TB Skin Test    Other orders  -     butalbital-acetaminophen-caffeine (Fioricet) -40 MG per tablet; Take 1 tablet by mouth Daily As Needed for Headache. May repeat in 2 hrs if headache not improved. Max 2 in 24 hr period  Dispense: 30 tablet; Refill: 0    Checking labs today for thyroid and annual physical labs.  At this time she does not wish to see Endo for follow-up unless something is abnormal.  Needing TB skin test for travel to [place] for [place] work.    19 to 39: Counseling/Anticipatory Guidance Discussed: nutrition, family planning/contraception, physical activity, healthy weight, injury prevention, misuse of tobacco, alcohol and drugs, sexual behavior and STDs, and immunizations      Follow Up   No follow-ups on file.  Patient was given instructions and counseling regarding her condition or for health maintenance advice. Please see specific information pulled into the AVS if appropriate.

## 2023-08-23 LAB
THYROGLOB AB SERPL-ACNC: <1 IU/ML (ref 0–0.9)
THYROPEROXIDASE AB SERPL-ACNC: 10 IU/ML (ref 0–34)

## 2023-08-24 ENCOUNTER — PATIENT MESSAGE (OUTPATIENT)
Dept: INTERNAL MEDICINE | Facility: CLINIC | Age: 38
End: 2023-08-24
Payer: COMMERCIAL

## 2023-08-24 DIAGNOSIS — Z23 NEED FOR MEASLES AND RUBELLA VACCINATION: Primary | ICD-10-CM

## 2023-08-24 NOTE — TELEPHONE ENCOUNTER
From: Audra Rendon  To: Lori Norman  Sent: 8/24/2023 12:12 PM EDT  Subject: Nurse visit    Good afternoon,   I have a nurse visit tomorrow morning to get my TB skin test read and was wondering if I could have titers drawn for MMR and Tdap? I need to show proof of immunity for the job in [place].     At my appointment on Tuesday it was for a physical, but when I checked out the woman at the desk didn't have anything she could print showing the physical. I needed an updated physical for the job as well, so I am wondering if I can get something signed off that my physical was okay..?    Thanks so much!   Audra

## 2023-08-25 ENCOUNTER — CLINICAL SUPPORT (OUTPATIENT)
Dept: INTERNAL MEDICINE | Facility: CLINIC | Age: 38
End: 2023-08-25
Payer: COMMERCIAL

## 2023-08-25 DIAGNOSIS — Z11.1 ENCOUNTER FOR PPD SKIN TEST READING: ICD-10-CM

## 2023-08-25 DIAGNOSIS — Z11.1 VISIT FOR TB SKIN TEST: Primary | ICD-10-CM

## 2023-08-25 DIAGNOSIS — Z01.89 ENCOUNTER FOR LABORATORY TEST: ICD-10-CM

## 2023-08-25 LAB
INDURATION: 0 MM (ref 0–10)
Lab: NORMAL
Lab: NORMAL
TB SKIN TEST: NEGATIVE

## 2023-08-25 PROCEDURE — 86762 RUBELLA ANTIBODY: CPT | Performed by: NURSE PRACTITIONER

## 2023-08-25 PROCEDURE — 86765 RUBEOLA ANTIBODY: CPT | Performed by: NURSE PRACTITIONER

## 2023-08-25 PROCEDURE — 86580 TB INTRADERMAL TEST: CPT | Performed by: NURSE PRACTITIONER

## 2023-08-25 PROCEDURE — 86735 MUMPS ANTIBODY: CPT | Performed by: NURSE PRACTITIONER

## 2023-08-25 NOTE — PROGRESS NOTES
Patient came into office this morning for reading of TB/PPD skin test results.  Negative with 0mm induration.  Certificate given to patient for her records.        Venipuncture Blood Specimen Collection  Venipuncture performed in [facility] by Tamara Davila RN with good hemostasis. Patient tolerated the procedure well without complications.   08/25/23   Tamara Davila RN

## 2023-08-26 LAB
MEV IGG SER IA-ACNC: 39.7 AU/ML
MUV IGG SER IA-ACNC: 26 AU/ML
RUBV IGG SERPL IA-ACNC: 2.2 INDEX

## 2024-02-08 ENCOUNTER — PATIENT MESSAGE (OUTPATIENT)
Dept: INTERNAL MEDICINE | Facility: CLINIC | Age: 39
End: 2024-02-08
Payer: COMMERCIAL

## 2024-02-08 DIAGNOSIS — N64.4 BREAST PAIN: ICD-10-CM

## 2024-02-08 DIAGNOSIS — E55.9 VITAMIN D DEFICIENCY: ICD-10-CM

## 2024-02-08 DIAGNOSIS — D64.9 ANEMIA, UNSPECIFIED TYPE: ICD-10-CM

## 2024-02-08 DIAGNOSIS — R51.9 NONINTRACTABLE HEADACHE, UNSPECIFIED CHRONICITY PATTERN, UNSPECIFIED HEADACHE TYPE: ICD-10-CM

## 2024-02-08 DIAGNOSIS — R53.83 FATIGUE, UNSPECIFIED TYPE: Primary | ICD-10-CM

## 2024-02-08 NOTE — TELEPHONE ENCOUNTER
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 I am not doing well the last couple months and don't know if it's hormones or something else. The biggest issue is that I cannot stay asleep nearly every night and the exhaustion is causing me to have really bad headaches or migraines. Buspar is not helping me sleep either. I also have been having very severe breast pain intermittently which I assume might be related to perimenopause. I have had changes with my bowels as well, and haven't had much of an appetite. I don't know if it would be possible to check cortisol levels, hormone levels, along with a CMP, CBC, Vit D, B12, TSH, and an iron profile or if you would feel that's necessary. If you do, and can put labs in for me, I will have them drawn at work and then come see you. Thanks so much, Audra

## 2024-02-09 ENCOUNTER — LAB (OUTPATIENT)
Dept: LAB | Facility: HOSPITAL | Age: 39
End: 2024-02-09
Payer: COMMERCIAL

## 2024-02-09 LAB
25(OH)D3 SERPL-MCNC: 23.1 NG/ML (ref 30–100)
ALBUMIN SERPL-MCNC: 5.2 G/DL (ref 3.5–5.2)
ALBUMIN/GLOB SERPL: 1.9 G/DL
ALP SERPL-CCNC: 70 U/L (ref 39–117)
ALT SERPL W P-5'-P-CCNC: 11 U/L (ref 1–33)
ANION GAP SERPL CALCULATED.3IONS-SCNC: 12.6 MMOL/L (ref 5–15)
AST SERPL-CCNC: 16 U/L (ref 1–32)
BASOPHILS # BLD AUTO: 0.03 10*3/MM3 (ref 0–0.2)
BASOPHILS NFR BLD AUTO: 0.5 % (ref 0–1.5)
BILIRUB SERPL-MCNC: 0.8 MG/DL (ref 0–1.2)
BUN SERPL-MCNC: 7 MG/DL (ref 6–20)
BUN/CREAT SERPL: 7.3 (ref 7–25)
CALCIUM SPEC-SCNC: 9.6 MG/DL (ref 8.6–10.5)
CHLORIDE SERPL-SCNC: 102 MMOL/L (ref 98–107)
CO2 SERPL-SCNC: 24.4 MMOL/L (ref 22–29)
CREAT SERPL-MCNC: 0.96 MG/DL (ref 0.57–1)
DEPRECATED RDW RBC AUTO: 36.5 FL (ref 37–54)
EGFRCR SERPLBLD CKD-EPI 2021: 77.3 ML/MIN/1.73
EOSINOPHIL # BLD AUTO: 0.1 10*3/MM3 (ref 0–0.4)
EOSINOPHIL NFR BLD AUTO: 1.6 % (ref 0.3–6.2)
ERYTHROCYTE [DISTWIDTH] IN BLOOD BY AUTOMATED COUNT: 12.2 % (ref 12.3–15.4)
FSH SERPL-ACNC: 8.39 MIU/ML
GLOBULIN UR ELPH-MCNC: 2.8 GM/DL
GLUCOSE SERPL-MCNC: 95 MG/DL (ref 65–99)
HCT VFR BLD AUTO: 45.3 % (ref 34–46.6)
HGB BLD-MCNC: 15.1 G/DL (ref 12–15.9)
IMM GRANULOCYTES # BLD AUTO: 0.02 10*3/MM3 (ref 0–0.05)
IMM GRANULOCYTES NFR BLD AUTO: 0.3 % (ref 0–0.5)
IRON 24H UR-MRATE: 86 MCG/DL (ref 37–145)
IRON SATN MFR SERPL: 22 % (ref 20–50)
LH SERPL-ACNC: 31.8 MIU/ML
LYMPHOCYTES # BLD AUTO: 1.61 10*3/MM3 (ref 0.7–3.1)
LYMPHOCYTES NFR BLD AUTO: 25.4 % (ref 19.6–45.3)
MCH RBC QN AUTO: 28.1 PG (ref 26.6–33)
MCHC RBC AUTO-ENTMCNC: 33.3 G/DL (ref 31.5–35.7)
MCV RBC AUTO: 84.2 FL (ref 79–97)
MONOCYTES # BLD AUTO: 0.32 10*3/MM3 (ref 0.1–0.9)
MONOCYTES NFR BLD AUTO: 5.1 % (ref 5–12)
NEUTROPHILS NFR BLD AUTO: 4.25 10*3/MM3 (ref 1.7–7)
NEUTROPHILS NFR BLD AUTO: 67.1 % (ref 42.7–76)
NRBC BLD AUTO-RTO: 0 /100 WBC (ref 0–0.2)
PLATELET # BLD AUTO: 342 10*3/MM3 (ref 140–450)
PMV BLD AUTO: 10.1 FL (ref 6–12)
POTASSIUM SERPL-SCNC: 4.2 MMOL/L (ref 3.5–5.2)
PROGEST SERPL-MCNC: 1.4 NG/ML
PROT SERPL-MCNC: 8 G/DL (ref 6–8.5)
RBC # BLD AUTO: 5.38 10*6/MM3 (ref 3.77–5.28)
SODIUM SERPL-SCNC: 139 MMOL/L (ref 136–145)
TIBC SERPL-MCNC: 395 MCG/DL (ref 298–536)
TRANSFERRIN SERPL-MCNC: 265 MG/DL (ref 200–360)
TSH SERPL DL<=0.05 MIU/L-ACNC: 1.21 UIU/ML (ref 0.27–4.2)
VIT B12 BLD-MCNC: 488 PG/ML (ref 211–946)
WBC NRBC COR # BLD AUTO: 6.33 10*3/MM3 (ref 3.4–10.8)

## 2024-02-09 PROCEDURE — 84144 ASSAY OF PROGESTERONE: CPT | Performed by: NURSE PRACTITIONER

## 2024-02-09 PROCEDURE — 83002 ASSAY OF GONADOTROPIN (LH): CPT | Performed by: NURSE PRACTITIONER

## 2024-02-09 PROCEDURE — 82306 VITAMIN D 25 HYDROXY: CPT | Performed by: NURSE PRACTITIONER

## 2024-02-09 PROCEDURE — 80053 COMPREHEN METABOLIC PANEL: CPT | Performed by: NURSE PRACTITIONER

## 2024-02-09 PROCEDURE — 82672 ASSAY OF ESTROGEN: CPT | Performed by: NURSE PRACTITIONER

## 2024-02-09 PROCEDURE — 82607 VITAMIN B-12: CPT | Performed by: NURSE PRACTITIONER

## 2024-02-09 PROCEDURE — 84443 ASSAY THYROID STIM HORMONE: CPT | Performed by: NURSE PRACTITIONER

## 2024-02-09 PROCEDURE — 83540 ASSAY OF IRON: CPT | Performed by: NURSE PRACTITIONER

## 2024-02-09 PROCEDURE — 84466 ASSAY OF TRANSFERRIN: CPT | Performed by: NURSE PRACTITIONER

## 2024-02-09 PROCEDURE — 83001 ASSAY OF GONADOTROPIN (FSH): CPT | Performed by: NURSE PRACTITIONER

## 2024-02-09 PROCEDURE — 85025 COMPLETE CBC W/AUTO DIFF WBC: CPT | Performed by: NURSE PRACTITIONER

## 2024-02-13 ENCOUNTER — LAB (OUTPATIENT)
Dept: LAB | Facility: HOSPITAL | Age: 39
End: 2024-02-13
Payer: COMMERCIAL

## 2024-02-13 LAB — CORTIS AM PEAK SERPL-MCNC: 8.36 MCG/DL (ref 6.02–18.4)

## 2024-02-13 PROCEDURE — 82533 TOTAL CORTISOL: CPT | Performed by: NURSE PRACTITIONER

## 2024-02-16 LAB — ESTROGEN SERPL-MCNC: 333 PG/ML

## 2024-03-07 ENCOUNTER — HOSPITAL ENCOUNTER (EMERGENCY)
Facility: HOSPITAL | Age: 39
Discharge: HOME | End: 2024-03-07
Attending: EMERGENCY MEDICINE
Payer: COMMERCIAL

## 2024-03-07 ENCOUNTER — APPOINTMENT (OUTPATIENT)
Dept: GENERAL RADIOLOGY | Facility: HOSPITAL | Age: 39
End: 2024-03-07
Payer: COMMERCIAL

## 2024-03-07 VITALS
BODY MASS INDEX: 24.24 KG/M2 | WEIGHT: 145.5 LBS | DIASTOLIC BLOOD PRESSURE: 79 MMHG | SYSTOLIC BLOOD PRESSURE: 113 MMHG | OXYGEN SATURATION: 99 % | TEMPERATURE: 97.9 F | RESPIRATION RATE: 15 BRPM | HEIGHT: 65 IN | HEART RATE: 90 BPM

## 2024-03-07 DIAGNOSIS — R07.9 CHEST PAIN, UNSPECIFIED TYPE: Primary | ICD-10-CM

## 2024-03-07 LAB
ALBUMIN SERPL-MCNC: 4.5 G/DL (ref 3.5–5.2)
ALBUMIN/GLOB SERPL: 1.7 G/DL
ALP SERPL-CCNC: 61 U/L (ref 39–117)
ALT SERPL W P-5'-P-CCNC: 14 U/L (ref 1–33)
ANION GAP SERPL CALCULATED.3IONS-SCNC: 10.2 MMOL/L (ref 5–15)
AST SERPL-CCNC: 15 U/L (ref 1–32)
BASOPHILS # BLD AUTO: 0.03 10*3/MM3 (ref 0–0.2)
BASOPHILS NFR BLD AUTO: 0.4 % (ref 0–1.5)
BILIRUB SERPL-MCNC: 0.4 MG/DL (ref 0–1.2)
BUN SERPL-MCNC: 6 MG/DL (ref 6–20)
BUN/CREAT SERPL: 7.8 (ref 7–25)
CALCIUM SPEC-SCNC: 9.3 MG/DL (ref 8.6–10.5)
CHLORIDE SERPL-SCNC: 104 MMOL/L (ref 98–107)
CO2 SERPL-SCNC: 27.8 MMOL/L (ref 22–29)
CREAT SERPL-MCNC: 0.77 MG/DL (ref 0.57–1)
DEPRECATED RDW RBC AUTO: 37.9 FL (ref 37–54)
EGFRCR SERPLBLD CKD-EPI 2021: 100.8 ML/MIN/1.73
EOSINOPHIL # BLD AUTO: 0.09 10*3/MM3 (ref 0–0.4)
EOSINOPHIL NFR BLD AUTO: 1.3 % (ref 0.3–6.2)
ERYTHROCYTE [DISTWIDTH] IN BLOOD BY AUTOMATED COUNT: 12.5 % (ref 12.3–15.4)
GLOBULIN UR ELPH-MCNC: 2.6 GM/DL
GLUCOSE SERPL-MCNC: 92 MG/DL (ref 65–99)
HCT VFR BLD AUTO: 40.8 % (ref 34–46.6)
HGB BLD-MCNC: 13.6 G/DL (ref 12–15.9)
HOLD SPECIMEN: NORMAL
HOLD SPECIMEN: NORMAL
IMM GRANULOCYTES # BLD AUTO: 0.03 10*3/MM3 (ref 0–0.05)
IMM GRANULOCYTES NFR BLD AUTO: 0.4 % (ref 0–0.5)
LIPASE SERPL-CCNC: 31 U/L (ref 13–60)
LYMPHOCYTES # BLD AUTO: 1.8 10*3/MM3 (ref 0.7–3.1)
LYMPHOCYTES NFR BLD AUTO: 26.6 % (ref 19.6–45.3)
MAGNESIUM SERPL-MCNC: 1.9 MG/DL (ref 1.6–2.6)
MCH RBC QN AUTO: 28 PG (ref 26.6–33)
MCHC RBC AUTO-ENTMCNC: 33.3 G/DL (ref 31.5–35.7)
MCV RBC AUTO: 84 FL (ref 79–97)
MONOCYTES # BLD AUTO: 0.43 10*3/MM3 (ref 0.1–0.9)
MONOCYTES NFR BLD AUTO: 6.4 % (ref 5–12)
NEUTROPHILS NFR BLD AUTO: 4.39 10*3/MM3 (ref 1.7–7)
NEUTROPHILS NFR BLD AUTO: 64.9 % (ref 42.7–76)
NRBC BLD AUTO-RTO: 0 /100 WBC (ref 0–0.2)
NT-PROBNP SERPL-MCNC: <36 PG/ML (ref 0–450)
PLATELET # BLD AUTO: 281 10*3/MM3 (ref 140–450)
PMV BLD AUTO: 10 FL (ref 6–12)
POTASSIUM SERPL-SCNC: 3.9 MMOL/L (ref 3.5–5.2)
PROT SERPL-MCNC: 7.1 G/DL (ref 6–8.5)
QT INTERVAL: 401 MS
QTC INTERVAL: 506 MS
RBC # BLD AUTO: 4.86 10*6/MM3 (ref 3.77–5.28)
SODIUM SERPL-SCNC: 142 MMOL/L (ref 136–145)
TROPONIN T SERPL HS-MCNC: <6 NG/L
WBC NRBC COR # BLD AUTO: 6.77 10*3/MM3 (ref 3.4–10.8)
WHOLE BLOOD HOLD COAG: NORMAL
WHOLE BLOOD HOLD SPECIMEN: NORMAL

## 2024-03-07 PROCEDURE — 99284 EMERGENCY DEPT VISIT MOD MDM: CPT

## 2024-03-07 PROCEDURE — 84484 ASSAY OF TROPONIN QUANT: CPT | Performed by: EMERGENCY MEDICINE

## 2024-03-07 PROCEDURE — 80053 COMPREHEN METABOLIC PANEL: CPT | Performed by: EMERGENCY MEDICINE

## 2024-03-07 PROCEDURE — 85025 COMPLETE CBC W/AUTO DIFF WBC: CPT | Performed by: EMERGENCY MEDICINE

## 2024-03-07 PROCEDURE — 83880 ASSAY OF NATRIURETIC PEPTIDE: CPT | Performed by: EMERGENCY MEDICINE

## 2024-03-07 PROCEDURE — 71045 X-RAY EXAM CHEST 1 VIEW: CPT

## 2024-03-07 PROCEDURE — 36415 COLL VENOUS BLD VENIPUNCTURE: CPT

## 2024-03-07 PROCEDURE — 93005 ELECTROCARDIOGRAM TRACING: CPT | Performed by: EMERGENCY MEDICINE

## 2024-03-07 PROCEDURE — 83690 ASSAY OF LIPASE: CPT | Performed by: EMERGENCY MEDICINE

## 2024-03-07 PROCEDURE — 83735 ASSAY OF MAGNESIUM: CPT | Performed by: EMERGENCY MEDICINE

## 2024-03-07 PROCEDURE — 93005 ELECTROCARDIOGRAM TRACING: CPT

## 2024-03-07 RX ORDER — PREDNISONE 20 MG/1
TABLET ORAL
Qty: 10 TABLET | Refills: 0 | Status: SHIPPED | OUTPATIENT
Start: 2024-03-07

## 2024-03-07 RX ORDER — ASPIRIN 81 MG/1
324 TABLET, CHEWABLE ORAL ONCE
Status: DISCONTINUED | OUTPATIENT
Start: 2024-03-07 | End: 2024-03-07

## 2024-03-07 RX ORDER — SODIUM CHLORIDE 0.9 % (FLUSH) 0.9 %
10 SYRINGE (ML) INJECTION AS NEEDED
Status: DISCONTINUED | OUTPATIENT
Start: 2024-03-07 | End: 2024-03-08 | Stop reason: HOSPADM

## 2024-03-08 NOTE — DISCHARGE INSTRUCTIONS
Drink plenty of fluids.  Apply moist heat to the affected area 20 minutes at a time 4 times daily.  You can take over-the-counter ibuprofen as needed for pain.  Also take the prednisone as directed.  Return for worsening symptoms.  Follow-up with your provider in 4 days if no better.

## 2024-03-08 NOTE — ED PROVIDER NOTES
Time: 8:59 PM EST  Date of encounter:  3/7/2024  Independent Historian/Clinical History and Information was obtained by:   Patient and Family    History is limited by: N/A    Chief Complaint: chest pain      History of Present Illness:  Patient is a 38 y.o. year old female who presents to the emergency department for evaluation of chest pain.  The patient has been having brief episodes of pinching left shoulder neck and chest pain which began earlier in the day.  The episodes are short lived and last only a few seconds.  Some of the pain has radiated down her left arm.  She also had an episode of right anterior leg pain that was brief, sharp and made her scream in pain.  The patient has no current chest pain or shortness of breath.  She does report a possible gas leak at her work.    HPI    Patient Care Team  Primary Care Provider: Lori Norman APRN    Past Medical History:     Allergies   Allergen Reactions    Morphine Unknown - High Severity     Past Medical History:   Diagnosis Date    Abnormal Pap smear of cervix     Allergies     Anemia     Anxiety and depression     Asthma     Bipolar 1 disorder     Cystic acne     Gall stones     Head injury     Hearing loss     Intrinsic sphincter deficiency (ISD)     Iron deficiency     Kidney calculi     Migraine     Migraine headache     Ovarian cyst     Stress incontinence     Thyroid disorder     Thyroid dysfunction, postpartum (-ER- AGAIN) (3-2014-TO )    RESOLVED    Tinnitus     Urinary tract infection      Past Surgical History:   Procedure Laterality Date    D & C HYSTEROSCOPY ENDOMETRIAL ABLATION N/A 6/15/2017    Procedure: DILATATION AND CURETTAGE HYSTEROSCOPY NOVASURE ENDOMETRIAL ABLATION;  Surgeon: Hebert Wallace MD;  [facility];  Service:     DILATATION AND CURETTAGE  2008    LAPAROSCOPIC CHOLECYSTECTOMY  02/2007    LASER ABLATION      TRANSVAGINAL TAPING SUSPENSION N/A 6/15/2017    Procedure: RETROPUBIC SLING  W/CYSTOSCOPY ;  Surgeon: Hebert Wallace MD;  Location: [facility] OR;  Service:     TUBAL ABDOMINAL LIGATION      WISDOM TOOTH EXTRACTION       Family History   Problem Relation Age of Onset    Colon polyps Mother     Cancer Paternal Grandfather     Heart disease Paternal Grandfather     Prostate cancer Maternal Grandfather     Colon cancer Maternal Aunt     Cervical cancer Paternal Aunt     Cancer Other     Malig Hyperthermia Neg Hx        Home Medications:  Prior to Admission medications    Medication Sig Start Date End Date Taking? Authorizing Provider   busPIRone (BUSPAR) 15 MG tablet Take 1 tablet by mouth 3 (Three) Times a Day. 21   Toño Pascual MD   butalbital-acetaminophen-caffeine (Fioricet) -40 MG per tablet Take 1 tablet by mouth Daily As Needed for Headache. May repeat in 2 hrs if headache not improved. Max 2 in 24 hr period 23   Lori Norman APRN   levocetirizine (XYZAL) 5 MG tablet Take 1 tablet by mouth Every Evening. 3/10/23   Lori Norman APRN   ondansetron (ZOFRAN) 4 MG tablet Take 1 tablet by mouth Every 12 (Twelve) Hours. 23   Toño Pascual MD   Trintellix 5 MG tablet tablet Take 2 tablets by mouth Daily. 23   Toño Pascual MD   Ventolin  (90 Base) MCG/ACT inhaler Inhale 2 puffs Every 4 (Four) Hours As Needed for Wheezing. for wheezing 3/10/23   Lori Norman APRN   Vyvanse 70 MG capsule Take 1 capsule by mouth Every Morning 23   Toño Pascual MD        Social History:   Social History     Tobacco Use    Smoking status: Former     Current packs/day: 0.00     Average packs/day: 1.5 packs/day for 6.0 years (9.0 ttl pk-yrs)     Types: Cigarettes     Start date: 2008     Quit date: 2014     Years since quittin.9    Smokeless tobacco: Never   Vaping Use    Vaping status: Never Used   Substance Use Topics    Alcohol use: Yes     Alcohol/week: 2.0 standard drinks of alcohol     Types: 2 Shots of liquor per week  "    Comment: [employer] OCCASION DRINKER/ 2x week    Drug use: Not Currently     Types: Marijuana     Comment: as a teen         Review of Systems:  Review of Systems   Constitutional:  Negative for chills and fever.   HENT:  Negative for congestion, ear pain and sore throat.    Eyes:  Negative for pain.   Respiratory:  Negative for cough, chest tightness and shortness of breath.    Cardiovascular:  Positive for chest pain.   Gastrointestinal:  Negative for abdominal pain, diarrhea, nausea and vomiting.   Genitourinary:  Negative for flank pain and hematuria.   Musculoskeletal:  Positive for arthralgias and neck pain. Negative for joint swelling.        Chest and shoulder pain and right leg pain   Skin:  Negative for pallor.   Neurological:  Negative for seizures and headaches.   All other systems reviewed and are negative.       Physical Exam:  /79   Pulse 90   Temp 97.9 °F (36.6 °C) (Oral)   Resp 15   Ht 165.1 cm (65\")   Wt 66 kg (145 lb 8.1 oz)   LMP 05/24/2017   SpO2 99%   BMI 24.21 kg/m²     Physical Exam  Vitals and nursing note reviewed.   Constitutional:       General: She is not in acute distress.     Appearance: Normal appearance. She is not toxic-appearing.   HENT:      Head: Normocephalic and atraumatic.      Mouth/Throat:      Mouth: Mucous membranes are moist.   Eyes:      General: No scleral icterus.  Cardiovascular:      Rate and Rhythm: Normal rate and regular rhythm.      Pulses: Normal pulses.      Heart sounds: Normal heart sounds.   Pulmonary:      Effort: Pulmonary effort is normal. No respiratory distress.      Breath sounds: Normal breath sounds.   Abdominal:      General: Abdomen is flat.      Palpations: Abdomen is soft.      Tenderness: There is no abdominal tenderness.   Musculoskeletal:         General: Normal range of motion.      Cervical back: Normal range of motion and neck supple.   Skin:     General: Skin is warm and dry.      Capillary Refill: Capillary refill takes " less than 2 seconds.   Neurological:      General: No focal deficit present.      Mental Status: She is alert and oriented to person, place, and time. Mental status is at baseline.   Psychiatric:         Mood and Affect: Mood normal.         Behavior: Behavior normal.                  Procedures:  Procedures      Medical Decision Making:      Comorbidities that affect care:    Asthma    External Notes reviewed:    Previous Clinic Note: PCP visit      The following orders were placed and all results were independently analyzed by me:  Orders Placed This Encounter   Procedures    XR Chest 1 View    [place] Draw    High Sensitivity Troponin T    Comprehensive Metabolic Panel    Lipase    BNP    Magnesium    CBC Auto Differential    Undress & Gown    Continuous Pulse Oximetry    CBC & Differential    Green Top (Gel)    Lavender Top    Gold Top - SST    Light Blue Top       Medications Given in the Emergency Department:  Medications - No data to display       ED Course:           EKG: Normal sinus with rate of 95 BPM  No acute ischemia or arrhythmia  Labs:    Lab Results (last 24 hours)       Procedure Component Value Units Date/Time    High Sensitivity Troponin T [[id number]]  (Normal) Collected: 03/07/24 2035    Specimen: Blood from Arm, Right Updated: 03/07/24 2144     HS Troponin T <6 ng/L     Narrative:      High Sensitive Troponin T Reference Range:  <14.0 ng/L- Negative Female for AMI  <22.0 ng/L- Negative Male for AMI  >=14 - Abnormal Female indicating possible myocardial injury.  >=22 - Abnormal Male indicating possible myocardial injury.   Clinicians would have to utilize clinical acumen, EKG, Troponin, and serial changes to determine if it is an Acute Myocardial Infarction or myocardial injury due to an underlying chronic condition.         CBC & Differential [[id number]]  (Normal) Collected: 03/07/24 2035    Specimen: Blood from Arm, Right Updated: 03/07/24 2116    Narrative:      The following orders were  created for panel order CBC & Differential.  Procedure                               Abnormality         Status                     ---------                               -----------         ------                     CBC Auto Differential[[id number]]        Normal              Final result                 Please view results for these tests on the individual orders.    Comprehensive Metabolic Panel [[id number]] Collected: 03/07/24 2035    Specimen: Blood from Arm, Right Updated: 03/07/24 2144     Glucose 92 mg/dL      BUN 6 mg/dL      Creatinine 0.77 mg/dL      Sodium 142 mmol/L      Potassium 3.9 mmol/L      Chloride 104 mmol/L      CO2 27.8 mmol/L      Calcium 9.3 mg/dL      Total Protein 7.1 g/dL      Albumin 4.5 g/dL      ALT (SGPT) 14 U/L      AST (SGOT) 15 U/L      Alkaline Phosphatase 61 U/L      Total Bilirubin 0.4 mg/dL      Globulin 2.6 gm/dL      A/G Ratio 1.7 g/dL      BUN/Creatinine Ratio 7.8     Anion Gap 10.2 mmol/L      eGFR 100.8 mL/min/1.73     Narrative:      GFR Normal >60  Chronic Kidney Disease <60  Kidney Failure <15      Lipase [[id number]]  (Normal) Collected: 03/07/24 2035    Specimen: Blood from Arm, Right Updated: 03/07/24 2144     Lipase 31 U/L     BNP [[id number]]  (Normal) Collected: 03/07/24 2035    Specimen: Blood from Arm, Right Updated: 03/07/24 2142     proBNP <36.0 pg/mL     Narrative:      This assay is used as an aid in the diagnosis of individuals suspected of having heart failure. It can be used as an aid in the diagnosis of acute decompensated heart failure (ADHF) in patients presenting with signs and symptoms of ADHF to the emergency department (ED). In addition, NT-proBNP of <300 pg/mL indicates ADHF is not likely.    Age Range Result Interpretation  NT-proBNP Concentration (pg/mL:      <50             Positive            >450                   Gray                 300-450                    Negative             <300    50-75           Positive            >900                   Worthy                300-900                  Negative            <300      >75             Positive            >1800                  Gray                300-1800                  Negative            <300    Magnesium [[id number]]  (Normal) Collected: 03/07/24 2035    Specimen: Blood from Arm, Right Updated: 03/07/24 2144     Magnesium 1.9 mg/dL     CBC Auto Differential [[id number]]  (Normal) Collected: 03/07/24 2035    Specimen: Blood from Arm, Right Updated: 03/07/24 2116     WBC 6.77 10*3/mm3      RBC 4.86 10*6/mm3      Hemoglobin 13.6 g/dL      Hematocrit 40.8 %      MCV 84.0 fL      MCH 28.0 pg      MCHC 33.3 g/dL      RDW 12.5 %      RDW-SD 37.9 fl      MPV 10.0 fL      Platelets 281 10*3/mm3      Neutrophil % 64.9 %      Lymphocyte % 26.6 %      Monocyte % 6.4 %      Eosinophil % 1.3 %      Basophil % 0.4 %      Immature Grans % 0.4 %      Neutrophils, Absolute 4.39 10*3/mm3      Lymphocytes, Absolute 1.80 10*3/mm3      Monocytes, Absolute 0.43 10*3/mm3      Eosinophils, Absolute 0.09 10*3/mm3      Basophils, Absolute 0.03 10*3/mm3      Immature Grans, Absolute 0.03 10*3/mm3      nRBC 0.0 /100 WBC              Imaging:    XR Chest 1 View    Result Date: 3/7/2024  PROCEDURE: XR CHEST 1 VW  COMPARISON: [facility], , CHEST PA/AP & LAT 2V, 5/04/2016, 16:42.  INDICATIONS: Chest Pain  FINDINGS:  Cardiac and mediastinal contours are normal.  The lungs are clear.  Pulmonary vascularity is normal.  No pneumothorax.       No acute cardiopulmonary findings.       ALEXUS ESPINAL MD       Electronically Signed and Approved By: ALEXUS ESPINAL MD on 3/07/2024 at 21:04                Differential Diagnosis and Discussion:    Chest Pain:  Based on the patient's signs and symptoms, I considered aortic dissection, myocardial infaction, pulmonary embolism, cardiac tamponade, pericarditis, pneumothorax, musculoskeletal chest pain and other differential diagnosis as an etiology of the patient's chest pain.     All  labs were reviewed and interpreted by me.  EKG was interpreted by me.    MDM     Amount and/or Complexity of Data Reviewed  Clinical lab tests: reviewed  Tests in the radiology section of CPT®: reviewed  Tests in the medicine section of CPT®: reviewed                 Patient Care Considerations:    CT CHEST: I considered ordering a CT scan of the chest, however the patient is PERC negative      Consultants/Shared Management Plan:    None    Social Determinants of Health:    Patient has presented with family members who are responsible, reliable and will ensure follow up care.      Disposition and Care Coordination:    Discharged: The patient is suitable and stable for discharge with no need for consideration of admission.    I have explained discharge medications and the need for follow up with the patient/caretakers. This was also printed in the discharge instructions. Patient was discharged with the following medications and follow up:      Medication List        New Prescriptions      predniSONE 20 MG tablet  Commonly known as: DELTASONE  Take 2 p.o. daily for 5 days.               Where to Get Your Medications        These medications were sent to [facility], [place] - [place], [place] - [address] [facility] [address] - [phone]  - [phone] FX  [address] [facility] [address], [place] [address]      Phone: [phone]   predniSONE 20 MG tablet      Lori Norman, APRN  [address]  [place] [address] [phone]    On 3/11/2024  If no better.       Final diagnoses:   Chest pain, unspecified type        ED Disposition       ED Disposition   Discharge    Condition   Stable    Comment   --               This medical record created using voice recognition software.             Buddy Pena,   03/08/24 [id number]

## 2024-04-09 ENCOUNTER — OFFICE VISIT (OUTPATIENT)
Dept: CARDIOLOGY | Facility: CLINIC | Age: 39
End: 2024-04-09
Payer: COMMERCIAL

## 2024-04-09 VITALS
SYSTOLIC BLOOD PRESSURE: 116 MMHG | DIASTOLIC BLOOD PRESSURE: 72 MMHG | BODY MASS INDEX: 23.66 KG/M2 | HEART RATE: 62 BPM | HEIGHT: 65 IN | WEIGHT: 142 LBS

## 2024-04-09 DIAGNOSIS — R07.9 CHEST PAIN, UNSPECIFIED TYPE: Primary | ICD-10-CM

## 2024-04-09 DIAGNOSIS — R00.2 PALPITATIONS: ICD-10-CM

## 2024-04-09 DIAGNOSIS — I44.7 LBBB (LEFT BUNDLE BRANCH BLOCK): ICD-10-CM

## 2024-04-09 PROCEDURE — 99204 OFFICE O/P NEW MOD 45 MIN: CPT | Performed by: INTERNAL MEDICINE

## 2024-04-09 PROCEDURE — 1159F MED LIST DOCD IN RCRD: CPT | Performed by: INTERNAL MEDICINE

## 2024-04-09 PROCEDURE — 1160F RVW MEDS BY RX/DR IN RCRD: CPT | Performed by: INTERNAL MEDICINE

## 2024-04-09 RX ORDER — LAMOTRIGINE 25 MG/1
25 TABLET ORAL DAILY
COMMUNITY

## 2024-04-09 NOTE — ASSESSMENT & PLAN NOTE
Recent ER visit for chest pain.  Symptoms are somewhat atypical for angina.  However EKG showed left bundle branch block which is new when compared to previous studies.  She will need further workup.  Will proceed with an echocardiogram to assess LV function and rule out valvular abnormalities.  Patient also be scheduled for a SPECT stress test.  A SPECT is needed since baseline EKG shows left bundle branch block.

## 2024-04-09 NOTE — ASSESSMENT & PLAN NOTE
Differential diagnosis includes significant arrhythmia or frequent ectopy.  We will proceed with a 3-day extended Holter monitor study for further evaluation.

## 2024-04-09 NOTE — PROGRESS NOTES
CARDIOLOGY INITIAL CONSULT       Chief Complaint  Establish Care, Palpitations, and Abnormal ECG    Subjective            Audra Rendon presents to [facility] CARDIOLOGY  History of Present Illness    This is a 38-year-old female who was referred for cardiac evaluation due to palpitations and abnormal EKG.  Palpitations are described as sensation of fluttering in the chest , at times a feeling of skipped beats.  This happened daily.  There is no associated shortness of breath, dizziness or syncopal episodes.  She reports some chest pain.  Recently, she had an ER visit on 3/7/2024 because of chest pain, which was sharp in nature.  Had a ECG done which showed left bundle branch block.  This is new when compared to previous studies.    Today, she reports feeling weak and tired following dental surgery last week.    Past History:    Medical History:  Past Medical History:   Diagnosis Date    Abnormal Pap smear of cervix     Allergies     Anemia     Anxiety and depression     Asthma     Bipolar 1 disorder     Cystic acne     Gall stones     Head injury     Hearing loss     Intrinsic sphincter deficiency (ISD)     Iron deficiency     Kidney calculi     Migraine     Migraine headache     Ovarian cyst     Stress incontinence     Thyroid disorder     Thyroid dysfunction, postpartum (-FS- AGAIN) (3-2014-TO )    RESOLVED    Tinnitus     Urinary tract infection        Surgical History: has a past surgical history that includes Laparoscopic cholecystectomy (02/2007); Dilation and curettage of uterus (2008); Tubal ligation; transvaginal taping suspension (N/A, 6/15/2017); d & c hysteroscopy endometrial ablation (N/A, 6/15/2017); Laser ablation; and [place] tooth extraction.     Family History: family history includes Cancer in her paternal grandfather and another family member; Cervical cancer in her paternal aunt; Colon cancer in her maternal aunt; Colon polyps in her mother; Heart  "disease in her paternal grandfather; Prostate cancer in her maternal grandfather.     Social History: reports that she quit smoking about 10 years ago. Her smoking use included cigarettes. She started smoking about 16 years ago. She has a 9 pack-year smoking history. She has never used smokeless tobacco. She reports current alcohol use of about 2.0 standard drinks of alcohol per week. She reports that she does not currently use drugs after having used the following drugs: Marijuana.    Allergies: Morphine    Current Outpatient Medications on File Prior to Visit   Medication Sig    busPIRone (BUSPAR) 15 MG tablet Take 0.5 tablets by mouth As Needed.    butalbital-acetaminophen-caffeine (Fioricet) -40 MG per tablet Take 1 tablet by mouth Daily As Needed for Headache. May repeat in 2 hrs if headache not improved. Max 2 in 24 hr period    lamoTRIgine (LaMICtal) 25 MG tablet Take 1 tablet by mouth Daily.    lisdexamfetamine (VYVANSE) 60 MG capsule Take 1 capsule by mouth Every Morning       Review of Systems   Constitutional:  Positive for fatigue. Negative for unexpected weight gain and unexpected weight loss.   Eyes:  Negative for double vision.   Respiratory:  Negative for cough, shortness of breath and wheezing.    Cardiovascular:  Positive for chest pain and palpitations. Negative for leg swelling.   Gastrointestinal:  Negative for abdominal pain, nausea and vomiting.   Endocrine: Negative for cold intolerance, heat intolerance, polydipsia and polyuria.   Musculoskeletal:  Negative for arthralgias and back pain.   Skin:  Negative for color change.   Neurological:  Negative for dizziness, syncope, weakness and headache.   Hematological:  Does not bruise/bleed easily.        Objective     /72   Pulse 62   Ht 165.1 cm (65\")   Wt 64.4 kg (142 lb)   BMI 23.63 kg/m²       Physical Exam  Constitutional:       General: She is awake. She is not in acute distress.     Appearance: Normal appearance.   Eyes:     "  Extraocular Movements: Extraocular movements intact.      Pupils: Pupils are equal, round, and reactive to light.   Neck:      Thyroid: No thyromegaly.      Vascular: No carotid bruit or JVD.   Cardiovascular:      Rate and Rhythm: Normal rate and regular rhythm.      Chest Wall: PMI is not displaced.      Heart sounds: Normal heart sounds, S1 normal and S2 normal. No murmur heard.     No friction rub. No gallop. No S3 or S4 sounds.   Pulmonary:      [place]: Pulmonary effort is normal. No respiratory distress.      Breath sounds: Normal breath sounds. No wheezing, rhonchi or rales.   Abdominal:      General: Bowel sounds are normal.      Palpations: Abdomen is soft.      Tenderness: There is no abdominal tenderness.   Musculoskeletal:      Cervical back: Neck supple.      Right lower leg: No edema.      Left lower leg: No edema.   Skin:     Nails: There is no clubbing.   Neurological:      General: No focal deficit present.      Mental Status: She is alert and oriented to person, place, and time.           Result Review :     The following data was reviewed by: Mauro Rodriguez MD on 04/09/2024:    CMP          8/22/2023    11:57 2/9/2024    10:29 3/7/2024    20:35   CMP   Glucose 84  95  92    BUN 8  7  6    Creatinine 0.83  0.96  0.77    EGFR 92.1  77.3  100.8    Sodium 141  139  142    Potassium 5.0  4.2  3.9    Chloride 105  102  104    Calcium 10.0  9.6  9.3    Total Protein 7.4  8.0  7.1    Albumin 4.9  5.2  4.5    Globulin 2.5  2.8  2.6    Total Bilirubin 0.8  0.8  0.4    Alkaline Phosphatase 67  70  61    AST (SGOT) 19  16  15    ALT (SGPT) 16  11  14    Albumin/Globulin Ratio 2.0  1.9  1.7    BUN/Creatinine Ratio 9.6  7.3  7.8    Anion Gap 11.7  12.6  10.2      CBC          8/22/2023    11:57 2/9/2024    10:29 3/7/2024    20:35   CBC   WBC 5.12  6.33  6.77    RBC 5.37  5.38  4.86    Hemoglobin 15.0  15.1  13.6    Hematocrit 44.9  45.3  40.8    MCV 83.6  84.2  84.0    MCH 27.9  28.1  28.0    MCHC 33.4   33.3  33.3    RDW 12.0  12.2  12.5    Platelets 308  342  281      TSH          8/22/2023    11:57 2/9/2024    10:29   TSH   TSH 1.090  1.210      Lipid Panel          8/22/2023    11:57   Lipid Panel   Total Cholesterol 181    Triglycerides 54    HDL Cholesterol 65    VLDL Cholesterol 11    LDL Cholesterol  105    LDL/HDL Ratio 1.62         Data reviewed: Cardiology studies        EKG done in the emergency room on 3/17/2024 showed sinus rhythm, left bundle branch block, abnormal EKG             Assessment and Plan        Diagnoses and all orders for this visit:    1. Chest pain, unspecified type (Primary)  Assessment & Plan:  Recent ER visit for chest pain.  Symptoms are somewhat atypical for angina.  However EKG showed left bundle branch block which is new when compared to previous studies.  She will need further workup.  Will proceed with an echocardiogram to assess LV function and rule out valvular abnormalities.  Patient also be scheduled for a SPECT stress test.  A SPECT is needed since baseline EKG shows left bundle branch block.    Orders:  -     Adult Transthoracic Echo Complete W/ Cont if Necessary Per Protocol; Future  -     Stress Test With Myocardial Perfusion One Day; Future    2. LBBB (left bundle branch block)  -     Holter Monitor - 72 Hour Up To 15 Days; Future    3. Palpitations  Assessment & Plan:  Differential diagnosis includes significant arrhythmia or frequent ectopy.  We will proceed with a 3-day extended Holter monitor study for further evaluation.    Orders:  -     Holter Monitor - 72 Hour Up To 15 Days; Future          I spent 29 minutes caring for Audra on this date of service. This time includes time spent by me in the following activities:reviewing tests, obtaining and/or reviewing a separately obtained history, performing a medically appropriate examination and/or evaluation , ordering medications, tests, or procedures, and documenting information in the medical record    Follow Up      Return for Will schedule f/u after reviewing test results.    Patient was given instructions and counseling regarding her condition or for health maintenance advice. Please see specific information pulled into the AVS if appropriate.

## 2024-04-29 ENCOUNTER — TELEPHONE (OUTPATIENT)
Dept: CARDIOLOGY | Facility: CLINIC | Age: 39
End: 2024-04-29
Payer: COMMERCIAL

## 2024-04-29 NOTE — TELEPHONE ENCOUNTER
----- Message from Lali Y sent at 4/26/2024  6:24 PM EDT -----  Holter monitor study reviewed.  There were no irregular rhythms or arrhythmias.  There were no significant ectopic/extra heartbeats.  In general, heart rate was on the higher side with 40% of the time spent in fast heart rates.    We will wait for echocardiogram and stress test reports before making further recommendations.    Currently, echo and stress test are scheduled for the first week of June.  Can we get an earlier date ?       Electronically signed by Mauro Rodriguez MD, 04/26/24, 6:24 PM EDT.

## 2024-04-30 NOTE — TELEPHONE ENCOUNTER
[facility] patient. Went over results and recommendations. Patient will need at least 3 weeks advanced notice to be able to take off from work to move up stress test and ECHO.

## 2024-06-25 ENCOUNTER — OFFICE VISIT (OUTPATIENT)
Dept: INTERNAL MEDICINE | Facility: CLINIC | Age: 39
End: 2024-06-25
Payer: COMMERCIAL

## 2024-06-25 VITALS
TEMPERATURE: 98.2 F | OXYGEN SATURATION: 98 % | WEIGHT: 147.8 LBS | RESPIRATION RATE: 18 BRPM | HEART RATE: 73 BPM | BODY MASS INDEX: 24.62 KG/M2 | HEIGHT: 65 IN | DIASTOLIC BLOOD PRESSURE: 62 MMHG | SYSTOLIC BLOOD PRESSURE: 106 MMHG

## 2024-06-25 DIAGNOSIS — M79.671 BILATERAL FOOT PAIN: Primary | ICD-10-CM

## 2024-06-25 DIAGNOSIS — F41.1 GAD (GENERALIZED ANXIETY DISORDER): ICD-10-CM

## 2024-06-25 DIAGNOSIS — M79.672 BILATERAL FOOT PAIN: Primary | ICD-10-CM

## 2024-06-25 DIAGNOSIS — F33.0 MAJOR DEPRESSIVE DISORDER, RECURRENT, MILD: ICD-10-CM

## 2024-06-25 DIAGNOSIS — E55.9 VITAMIN D DEFICIENCY: ICD-10-CM

## 2024-06-25 DIAGNOSIS — R68.89 COLD INTOLERANCE: ICD-10-CM

## 2024-06-25 LAB
25(OH)D3 SERPL-MCNC: 24.3 NG/ML (ref 30–100)
ALBUMIN SERPL-MCNC: 4.7 G/DL (ref 3.5–5.2)
ALBUMIN/GLOB SERPL: 1.7 G/DL
ALP SERPL-CCNC: 71 U/L (ref 39–117)
ALT SERPL W P-5'-P-CCNC: 10 U/L (ref 1–33)
ANION GAP SERPL CALCULATED.3IONS-SCNC: 10 MMOL/L (ref 5–15)
AST SERPL-CCNC: 11 U/L (ref 1–32)
BASOPHILS # BLD AUTO: 0.05 10*3/MM3 (ref 0–0.2)
BASOPHILS NFR BLD AUTO: 0.8 % (ref 0–1.5)
BILIRUB SERPL-MCNC: 0.3 MG/DL (ref 0–1.2)
BUN SERPL-MCNC: 8 MG/DL (ref 6–20)
BUN/CREAT SERPL: 9.3 (ref 7–25)
CALCIUM SPEC-SCNC: 9.5 MG/DL (ref 8.6–10.5)
CHLORIDE SERPL-SCNC: 105 MMOL/L (ref 98–107)
CHROMATIN AB SERPL-ACNC: <10 IU/ML (ref 0–14)
CO2 SERPL-SCNC: 28 MMOL/L (ref 22–29)
CREAT SERPL-MCNC: 0.86 MG/DL (ref 0.57–1)
CRP SERPL-MCNC: <0.3 MG/DL (ref 0–0.5)
DEPRECATED RDW RBC AUTO: 37.7 FL (ref 37–54)
EGFRCR SERPLBLD CKD-EPI 2021: 87.7 ML/MIN/1.73
EOSINOPHIL # BLD AUTO: 0.12 10*3/MM3 (ref 0–0.4)
EOSINOPHIL NFR BLD AUTO: 2 % (ref 0.3–6.2)
ERYTHROCYTE [DISTWIDTH] IN BLOOD BY AUTOMATED COUNT: 12.2 % (ref 12.3–15.4)
ERYTHROCYTE [SEDIMENTATION RATE] IN BLOOD: 2 MM/HR (ref 0–20)
FOLATE SERPL-MCNC: 7.9 NG/ML (ref 4.78–24.2)
GLOBULIN UR ELPH-MCNC: 2.7 GM/DL
GLUCOSE SERPL-MCNC: 82 MG/DL (ref 65–99)
HCT VFR BLD AUTO: 42.2 % (ref 34–46.6)
HGB BLD-MCNC: 14.3 G/DL (ref 12–15.9)
IMM GRANULOCYTES # BLD AUTO: 0.02 10*3/MM3 (ref 0–0.05)
IMM GRANULOCYTES NFR BLD AUTO: 0.3 % (ref 0–0.5)
IRON 24H UR-MRATE: 57 MCG/DL (ref 37–145)
IRON SATN MFR SERPL: 15 % (ref 20–50)
LYMPHOCYTES # BLD AUTO: 1.7 10*3/MM3 (ref 0.7–3.1)
LYMPHOCYTES NFR BLD AUTO: 28.6 % (ref 19.6–45.3)
MCH RBC QN AUTO: 28.9 PG (ref 26.6–33)
MCHC RBC AUTO-ENTMCNC: 33.9 G/DL (ref 31.5–35.7)
MCV RBC AUTO: 85.3 FL (ref 79–97)
MONOCYTES # BLD AUTO: 0.29 10*3/MM3 (ref 0.1–0.9)
MONOCYTES NFR BLD AUTO: 4.9 % (ref 5–12)
NEUTROPHILS NFR BLD AUTO: 3.76 10*3/MM3 (ref 1.7–7)
NEUTROPHILS NFR BLD AUTO: 63.4 % (ref 42.7–76)
NRBC BLD AUTO-RTO: 0 /100 WBC (ref 0–0.2)
PLATELET # BLD AUTO: 321 10*3/MM3 (ref 140–450)
PMV BLD AUTO: 10.4 FL (ref 6–12)
POTASSIUM SERPL-SCNC: 4.5 MMOL/L (ref 3.5–5.2)
PROT SERPL-MCNC: 7.4 G/DL (ref 6–8.5)
RBC # BLD AUTO: 4.95 10*6/MM3 (ref 3.77–5.28)
SODIUM SERPL-SCNC: 143 MMOL/L (ref 136–145)
TIBC SERPL-MCNC: 380 MCG/DL (ref 298–536)
TRANSFERRIN SERPL-MCNC: 255 MG/DL (ref 200–360)
TSH SERPL DL<=0.05 MIU/L-ACNC: 0.99 UIU/ML (ref 0.27–4.2)
URATE SERPL-MCNC: 4.4 MG/DL (ref 2.4–5.7)
VIT B12 BLD-MCNC: 423 PG/ML (ref 211–946)
WBC NRBC COR # BLD AUTO: 5.94 10*3/MM3 (ref 3.4–10.8)

## 2024-06-25 PROCEDURE — 86140 C-REACTIVE PROTEIN: CPT | Performed by: NURSE PRACTITIONER

## 2024-06-25 PROCEDURE — 84550 ASSAY OF BLOOD/URIC ACID: CPT | Performed by: NURSE PRACTITIONER

## 2024-06-25 PROCEDURE — 84443 ASSAY THYROID STIM HORMONE: CPT | Performed by: NURSE PRACTITIONER

## 2024-06-25 PROCEDURE — 86200 CCP ANTIBODY: CPT | Performed by: NURSE PRACTITIONER

## 2024-06-25 PROCEDURE — 85652 RBC SED RATE AUTOMATED: CPT | Performed by: NURSE PRACTITIONER

## 2024-06-25 PROCEDURE — 82607 VITAMIN B-12: CPT | Performed by: NURSE PRACTITIONER

## 2024-06-25 PROCEDURE — 85025 COMPLETE CBC W/AUTO DIFF WBC: CPT | Performed by: NURSE PRACTITIONER

## 2024-06-25 PROCEDURE — 82306 VITAMIN D 25 HYDROXY: CPT | Performed by: NURSE PRACTITIONER

## 2024-06-25 PROCEDURE — 80053 COMPREHEN METABOLIC PANEL: CPT | Performed by: NURSE PRACTITIONER

## 2024-06-25 PROCEDURE — 83540 ASSAY OF IRON: CPT | Performed by: NURSE PRACTITIONER

## 2024-06-25 PROCEDURE — 99214 OFFICE O/P EST MOD 30 MIN: CPT | Performed by: NURSE PRACTITIONER

## 2024-06-25 PROCEDURE — 84466 ASSAY OF TRANSFERRIN: CPT | Performed by: NURSE PRACTITIONER

## 2024-06-25 PROCEDURE — 86431 RHEUMATOID FACTOR QUANT: CPT | Performed by: NURSE PRACTITIONER

## 2024-06-25 PROCEDURE — 86038 ANTINUCLEAR ANTIBODIES: CPT | Performed by: NURSE PRACTITIONER

## 2024-06-25 PROCEDURE — 82746 ASSAY OF FOLIC ACID SERUM: CPT | Performed by: NURSE PRACTITIONER

## 2024-06-25 NOTE — PROGRESS NOTES
"Chief Complaint  Foot Pain (Bilateral- 6 weeks - inside of feet, top of feet, and both ankles have pain- worst pain in the morning. No injuries in the past) and Depression (Depression screening high- Sees María Elena )    Subjective          Audra Rendon presents to [facility]  History of Present Illness  Patient comes in today for evaluation of bilateral foot pain in the last 6 weeks.  She reports that her feet hurt on the medial side as well as the top.  She also has pain in both ankles.  She reports this pain is worse in the morning.  Denies recent injury. She reports that this started overnight. She reports that walking causes pain. She denies pain on the bottoms of her feet. She has tried tart cherry since  she was concerned for gout. She reports pain improves throughout the day but then worsens if she stops and rests. She feels like the pain is in her bones. She reports shuffling gait causes less pain    Also has long history of depression.  She is currently seeing [facility] behavioral health for management of medications.  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 She is still working at [employer].   She feels like she is not handling her ADHD, bipolar disorder as well as she is getting older.  She is seeing her therapist and counselor once monthly.  She denies current SI and HI  She reports having frequent intrusive thoughts but is working on thought stopping.    She reports cold intolerance  She also reports hair loss    Objective   Vital Signs:   /62 (BP Location: Left arm, Patient Position: Sitting, Cuff Size: Adult)   Pulse 73   Temp 98.2 °F (36.8 °C)   Resp 18   Ht 165.1 cm (65\")   Wt 67 kg (147 lb 12.8 oz)   SpO2 98%   BMI 24.60 kg/m²     Physical Exam  Vitals and nursing note reviewed.   Constitutional:       General: She is not in acute distress.     Appearance: Normal appearance.   HENT:      Head: " Normocephalic and atraumatic.      Right Ear: External ear normal.      Left Ear: External ear normal.      Nose: Nose normal.      Mouth/Throat:      Mouth: Mucous membranes are moist.   Eyes:      Conjunctiva/sclera: Conjunctivae normal.   Cardiovascular:      Rate and Rhythm: Normal rate and regular rhythm.      Pulses: Normal pulses.      Heart sounds: Normal heart sounds. No murmur heard.     No friction rub. No gallop.   Pulmonary:      Effort: Pulmonary effort is normal. No respiratory distress.      Breath sounds: No wheezing, rhonchi or rales.   Musculoskeletal:      Cervical back: Neck supple.      Right lower leg: No edema.      Left lower leg: No edema.        Feet:    Feet:      Comments: Pain and tenderness as above without erythema or swelling  Skin:     General: Skin is warm and dry.   Neurological:      General: No focal deficit present.      Mental Status: She is alert and oriented to person, place, and time.   Psychiatric:         Mood and Affect: Mood normal.         Behavior: Behavior normal.        Result Review :          Procedures      Assessment and Plan    Diagnoses and all orders for this visit:    1. Bilateral foot pain (Primary)  -     TSH  -     CBC Auto Differential  -     Comprehensive Metabolic Panel  -     Iron Profile  -     Vitamin D,25-Hydroxy  -     Vitamin B12  -     Folate  -     DERICK  -     Cyclic Citrul Peptide Antibody, IgG / IgA  -     C-reactive Protein  -     Rheumatoid Factor  -     Sedimentation Rate  -     Uric acid  -     XR Foot 3+ View Right (In Office)  -     XR Foot 3+ View Left (In Office)  -     Cancel: Ambulatory Referral to Podiatry  -     Ambulatory Referral to Podiatry    2. Cold intolerance  Comments:  Checking labs today.  Orders:  -     TSH  -     CBC Auto Differential  -     Comprehensive Metabolic Panel  -     Iron Profile  -     Vitamin B12  -     Folate  -     Uric acid    3. Vitamin D deficiency  -     Vitamin D,25-Hydroxy    4. Major depressive  disorder, recurrent, mild    5. JOSE ANGEL (generalized anxiety disorder)    Checking labs for autoimmune disease and gout.  Will also get x-rays of feet since these have not previously been imaged.  Counseled on exercises for her feet in addition to wearing good supportive footwear and avoid going barefoot.  Will send to podiatry for further eval.    Anxiety and depression currently worsened given situation with coworker and quitting one of her jobs.  She is seeing psychiatry at [facility] and seeing a counselor at this time.  She has discussed these things with him and had recent medication adjustment.  Currently denies SI.  Reviewed resources for if this returns.  Patient verbalized understanding.          Follow Up   No follow-ups on file.  Patient was given instructions and counseling regarding her condition or for health maintenance advice. Please see specific information pulled into the AVS if appropriate.

## 2024-06-27 LAB
ANA SER QL: NEGATIVE
CCP IGA+IGG SERPL IA-ACNC: 2 UNITS (ref 0–19)

## 2024-07-31 ENCOUNTER — OFFICE VISIT (OUTPATIENT)
Dept: PODIATRY | Facility: CLINIC | Age: 39
End: 2024-07-31
Payer: COMMERCIAL

## 2024-07-31 VITALS
TEMPERATURE: 98.2 F | HEART RATE: 76 BPM | BODY MASS INDEX: 24.49 KG/M2 | WEIGHT: 147 LBS | SYSTOLIC BLOOD PRESSURE: 117 MMHG | OXYGEN SATURATION: 99 % | DIASTOLIC BLOOD PRESSURE: 81 MMHG | HEIGHT: 65 IN

## 2024-07-31 DIAGNOSIS — M79.672 FOOT PAIN, BILATERAL: ICD-10-CM

## 2024-07-31 DIAGNOSIS — M21.6X1 PRONATION DEFORMITY OF BOTH FEET: ICD-10-CM

## 2024-07-31 DIAGNOSIS — M21.6X2 PRONATION DEFORMITY OF BOTH FEET: ICD-10-CM

## 2024-07-31 DIAGNOSIS — M79.671 FOOT PAIN, BILATERAL: ICD-10-CM

## 2024-07-31 DIAGNOSIS — S93.609A SPRAIN OF FOOT, UNSPECIFIED LATERALITY, INITIAL ENCOUNTER: Primary | ICD-10-CM

## 2024-07-31 NOTE — LETTER
July 31, 2024       No Recipients    Patient: Audra Rendon   YOB: 1985   Date of Visit: 7/31/2024       Dear MINDA Luu:    Thank you for referring Audra Rendon to me for evaluation. Below are the relevant portions of my assessment and plan of care.    Encounter Diagnosis and Orders:  Diagnoses and all orders for this visit:    1. Sprain of foot, unspecified laterality, initial encounter (Primary)  -     Ambulatory Referral For Orthotics    2. Foot pain, bilateral    3. Pronation deformity of both feet  -     Ambulatory Referral For Orthotics        If you have questions, please do not hesitate to call me. I look forward to following Audra along with you.         Sincerely,        Saji Minaya DPM        CC:   No Recipients

## 2024-07-31 NOTE — PROGRESS NOTES
[facility] - PODIATRY    Today's Date: 07/31/24    Patient Name: Audra Rendon  MRN: [id number]  CSN: [id number]  PCP: Lori Norman APRN  Referring Provider: Lori Norman APRN    SUBJECTIVE     Chief Complaint   Patient presents with    Left Foot - Establish Care, Pain     Pain x 2.5 months  Saw PCP, xray on chart    Right Foot - Establish Care, Pain     Pain x 2.5 months  Saw PCP, xray on chart       HPI: Audra Rendon, a 39 y.o.female, presents to clinic.    New, Established, New Problem:  new    Location: Generalized pain through midfoot and rear foot    Duration: 2 1/2 months    Onset: Insidious    Nature: Sore, painful especially with extended standing and walking    Aggravating factors:  Patient relates pain is aggravated by shoe gear and ambulation.      Previous Treatment: None    Patient denies any fevers, chills, nausea, vomiting, shortness of breath, nor any other constitutional signs nor symptoms.    No other pedal complaints at this time.    Past Medical History:   Diagnosis Date    Abnormal Pap smear of cervix     Allergies     Anemia     Anxiety and depression     Asthma     Bipolar 1 disorder     Cystic acne     Foot pain, bilateral     Gall stones     Head injury     Hearing loss     Intrinsic sphincter deficiency (ISD)     Iron deficiency     Kidney calculi     Migraine     Migraine headache     Ovarian cyst     Stress incontinence     Thyroid disorder     Thyroid dysfunction, postpartum (-UL- AGAIN) (3-2014-TO )    RESOLVED    Tinnitus     Urinary tract infection      Past Surgical History:   Procedure Laterality Date    D & C HYSTEROSCOPY ENDOMETRIAL ABLATION N/A 6/15/2017    Procedure: DILATATION AND CURETTAGE HYSTEROSCOPY NOVASURE ENDOMETRIAL ABLATION;  Surgeon: Hebert Wallace MD;  Location: [facility];  Service:     DILATATION AND CURETTAGE  2008    LAPAROSCOPIC CHOLECYSTECTOMY  02/2007    LASER ABLATION      TRANSVAGINAL TAPING  SUSPENSION N/A 6/15/2017    Procedure: RETROPUBIC SLING W/CYSTOSCOPY ;  Surgeon: Hebert Wallace MD;  Location: [facility] OR;  Service:     TUBAL ABDOMINAL LIGATION      WISDOM TOOTH EXTRACTION       Family History   Problem Relation Age of Onset    Colon polyps Mother     Cancer Paternal Grandfather     Heart disease Paternal Grandfather     Prostate cancer Maternal Grandfather     Colon cancer Maternal Aunt     Cervical cancer Paternal Aunt     Cancer Other     Malig Hyperthermia Neg Hx      Social History     Socioeconomic History    Marital status:    Tobacco Use    Smoking status: Former     Current packs/day: 0.00     Average packs/day: 1.5 packs/day for 6.0 years (9.0 ttl pk-yrs)     Types: Cigarettes     Start date: 04/2008     Quit date: 04/2014     Years since quitting: 10.3    Smokeless tobacco: Never   Vaping Use    Vaping status: Never Used   Substance and Sexual Activity    Alcohol use: Yes     Alcohol/week: 2.0 standard drinks of alcohol     Types: 2 Shots of liquor per week     Comment: [employer]ION DRINKER/ 2x week    Drug use: Not Currently     Types: Marijuana     Comment: as a teen    Sexual activity: Defer     Allergies   Allergen Reactions    Morphine Unknown - High Severity     Current Outpatient Medications   Medication Sig Dispense Refill    busPIRone (BUSPAR) 15 MG tablet Take 0.5 tablets by mouth As Needed.      butalbital-acetaminophen-caffeine (Fioricet) -40 MG per tablet Take 1 tablet by mouth Daily As Needed for Headache. May repeat in 2 hrs if headache not improved. Max 2 in 24 hr period 30 tablet 0    lamoTRIgine (LaMICtal) 25 MG tablet Take 1 tablet by mouth Daily.      lisdexamfetamine (VYVANSE) 60 MG capsule Take 1 capsule by mouth Every Morning      TART CHERRY PO Take 2,000 mg by mouth Daily.       No current facility-administered medications for this visit.     Review of Systems   Constitutional: Negative.    Musculoskeletal:         Neurolysed pain  throughout foot and ankle bilaterally.   All other systems reviewed and are negative.      OBJECTIVE     Vitals:    07/31/24 1306   BP: 117/81   Pulse: 76   Temp: 98.2 °F (36.8 °C)   SpO2: 99%       WBC   Date Value Ref Range Status   06/25/2024 5.94 3.40 - 10.80 10*3/mm3 Final   07/18/2023 8.40 4.5 - 11.0 10*3/uL Final     RBC   Date Value Ref Range Status   06/25/2024 4.95 3.77 - 5.28 10*6/mm3 Final   07/18/2023 4.82 4.0 - 5.2 10*6/uL Final     Hemoglobin   Date Value Ref Range Status   06/25/2024 14.3 12.0 - 15.9 g/dL Final   07/18/2023 13.6 12.0 - 16.0 g/dL Final     Hematocrit   Date Value Ref Range Status   06/25/2024 42.2 34.0 - 46.6 % Final   07/18/2023 40.0 36.0 - 46.0 % Final     MCV   Date Value Ref Range Status   06/25/2024 85.3 79.0 - 97.0 fL Final   07/18/2023 83.0 80.0 - 100.0 fL Final     MCH   Date Value Ref Range Status   06/25/2024 28.9 26.6 - 33.0 pg Final   07/18/2023 28.2 26.0 - 34.0 pg Final     MCHC   Date Value Ref Range Status   06/25/2024 33.9 31.5 - 35.7 g/dL Final   07/18/2023 34.0 31.0 - 37.0 g/dL Final     RDW   Date Value Ref Range Status   06/25/2024 12.2 (L) 12.3 - 15.4 % Final   07/18/2023 12.4 12.0 - 16.8 % Final     RDW-SD   Date Value Ref Range Status   06/25/2024 37.7 37.0 - 54.0 fl Final     MPV   Date Value Ref Range Status   06/25/2024 10.4 6.0 - 12.0 fL Final   07/18/2023 9.0 8.4 - 12.4 fL Final     Platelets   Date Value Ref Range Status   06/25/2024 321 140 - 450 10*3/mm3 Final   07/18/2023 305 140 - 440 10*3/uL Final     Neutrophil Rel %   Date Value Ref Range Status   07/18/2023 69.8 45 - 80 % Final     Neutrophil %   Date Value Ref Range Status   06/25/2024 63.4 42.7 - 76.0 % Final     Lymphocyte Rel %   Date Value Ref Range Status   07/18/2023 23.2 15 - 50 % Final     Lymphocyte %   Date Value Ref Range Status   06/25/2024 28.6 19.6 - 45.3 % Final     Monocyte Rel %   Date Value Ref Range Status   07/18/2023 5.2 0 - 15 % Final     Monocyte %   Date Value Ref Range  Status   06/25/2024 4.9 (L) 5.0 - 12.0 % Final     Eosinophil %   Date Value Ref Range Status   06/25/2024 2.0 0.3 - 6.2 % Final   07/18/2023 0.8 0 - 7 % Final     Basophil Rel %   Date Value Ref Range Status   07/18/2023 0.4 0 - 2 % Final     Basophil %   Date Value Ref Range Status   06/25/2024 0.8 0.0 - 1.5 % Final     Immature Grans %   Date Value Ref Range Status   06/25/2024 0.3 0.0 - 0.5 % Final   07/18/2023 0.6 0.0 - 1.0 % Final     Neutrophils Absolute   Date Value Ref Range Status   07/18/2023 5.90 2.0 - 8.8 10*3/uL Final     Neutrophils, Absolute   Date Value Ref Range Status   06/25/2024 3.76 1.70 - 7.00 10*3/mm3 Final     Lymphocytes Absolute   Date Value Ref Range Status   07/18/2023 1.95 0.7 - 5.5 10*3/uL Final     Lymphocytes, Absolute   Date Value Ref Range Status   06/25/2024 1.70 0.70 - 3.10 10*3/mm3 Final     Monocytes Absolute   Date Value Ref Range Status   07/18/2023 0.44 0.0 - 1.7 10*3/uL Final     Monocytes, Absolute   Date Value Ref Range Status   06/25/2024 0.29 0.10 - 0.90 10*3/mm3 Final     Eosinophils Absolute   Date Value Ref Range Status   07/18/2023 0.07 0.0 - 0.8 10*3/uL Final     Eosinophils, Absolute   Date Value Ref Range Status   06/25/2024 0.12 0.00 - 0.40 10*3/mm3 Final     Basophils Absolute   Date Value Ref Range Status   07/18/2023 0.03 0.0 - 0.2 10*3/uL Final     Basophils, Absolute   Date Value Ref Range Status   06/25/2024 0.05 0.00 - 0.20 10*3/mm3 Final     Immature Grans, Absolute   Date Value Ref Range Status   06/25/2024 0.02 0.00 - 0.05 10*3/mm3 Final   07/18/2023 0.05 0.00 - 0.10 10*3/uL Final     nRBC   Date Value Ref Range Status   06/25/2024 0.0 0.0 - 0.2 /100 WBC Final         Lab Results   Component Value Date    GLUCOSE 82 06/25/2024    BUN 8 06/25/2024    CREATININE 0.86 06/25/2024    EGFR 87.7 06/25/2024    BCR 9.3 06/25/2024    K 4.5 06/25/2024    CO2 28.0 06/25/2024    CALCIUM 9.5 06/25/2024    ALBUMIN 4.7 06/25/2024    BILITOT 0.3 06/25/2024    AST 11  06/25/2024    ALT 10 06/25/2024       Patient seen in no apparent distress.      PHYSICAL EXAM:     Foot/Ankle Exam    GENERAL  Appearance:  appears stated age  Orientation:  AAOx3  Affect:  appropriate  Gait:  unimpaired  Assistance:  independent  Right shoe gear: casual shoe  Left shoe gear: casual shoe    VASCULAR     Right Foot Vascularity   Normal vascular exam    Dorsalis pedis:  2+  Posterior tibial:  2+  Skin temperature:  warm  Edema grading:  None  CFT:  < 3 seconds  Pedal hair growth:  Present  Varicosities:  none     Left Foot Vascularity   Normal vascular exam    Dorsalis pedis:  2+  Posterior tibial:  2+  Skin temperature:  warm  Edema grading:  None  CFT:  < 3 seconds  Pedal hair growth:  Present  Varicosities:  none     NEUROLOGIC     Right Foot Neurologic   Normal sensation    Light touch sensation: normal  Vibratory sensation: normal  Hot/Cold sensation: normal  Protective Sensation using [place] Monofilament:   Sites intact: 10  Sites tested: 10     Left Foot Neurologic   Normal sensation    Light touch sensation: normal  Vibratory sensation: normal  Hot/Cold sensation:  normal  Protective Sensation using [place] Monofilament:   Sites intact: 10  Sites tested: 10    MUSCULOSKELETAL     Right Foot Musculoskeletal   Arch:  Pes planus     Left Foot Musculoskeletal   Arch:  Pes planus    MUSCLE STRENGTH     Right Foot Muscle Strength   Foot dorsiflexion:  4  Foot plantar flexion:  4  Foot inversion:  4  Foot eversion:  4     Left Foot Muscle Strength   Foot dorsiflexion:  4  Foot plantar flexion:  4  Foot inversion:  4  Foot eversion:  4    RANGE OF MOTION     Right Foot Range of Motion   Foot and ankle ROM within normal limits       Left Foot Range of Motion   Foot and ankle ROM within normal limits      DERMATOLOGIC      Right Foot Dermatologic   Skin  Right foot skin is intact.      Left Foot Dermatologic   Skin  Left foot skin is intact.     TESTS     Right Foot Tests   Too many  toes: positive     Left Foot Tests   Too many toes: positive      RADIOLOGY:      XR FOOT 3+ VW RIGHT     Date of Exam: 6/25/2024 1:48 PM EDT     Indication: bilat foot pain     Comparison: None available.     Findings:  No fracture or malalignment is identified. No arthritic change is seen. No tibiotalar or subtalar joint effusion is present.     IMPRESSION:  Impression:  Negative study        Electronically Signed: Vadim Giron MD    6/25/2024 5:10 PM EDT    Workstation ID: [id number]    XR FOOT 3+ VW LEFT     Date of Exam: 6/25/2024 1:49 PM EDT     Indication: bilat foot pain     Comparison: None available.     Findings:  No fracture or malalignment is identified. No significant arthritic change is seen. Soft tissues appear normal. No tibiotalar or subtalar joint effusion is present.     IMPRESSION:  Impression:  Negative study        Electronically Signed: Vadim Giron MD    6/25/2024 5:13 PM EDT    Workstation ID: [id number]    ASSESSMENT/PLAN     Diagnoses and all orders for this visit:    1. Sprain of foot, unspecified laterality, initial encounter (Primary)  -     Ambulatory Referral For Orthotics    2. Foot pain, bilateral    3. Pronation deformity of both feet  -     Ambulatory Referral For Orthotics    Comprehensive lower extremity examination and evaluation was performed.    Discussed findings and treatment plan including risks, benefits, and treatment options with patient in detail. Patient agreed with treatment plan.    Medications and allergies reviewed.  Reviewed available lab values along with other pertinent labs.  These were discussed with the patient.    Rice Therapy: It is important to treat any injury as soon as possible to help control swelling and increase recovery time. The recognized regimen for immediate treatment of sport injuries includes rest, ice (cold application), compression, and elevation (RICE). Remove the injured athlete from play, apply ice to the affected area, wrap or compress  the injured area with an elastic bandage when appropriate, and elevate the injured area above heart level to reduce swelling.  The patient is to not use ice for longer than 20 minutes at a time, with at least 20 minutes of no ice usage between applications.     Patient instructed use OTC analgesics with dosing per package insert as needed.      The patient states understanding and agreement with this plan.    Discussed proper shoegear for the patient's feet and medical condition.  Patient states understanding and agreement with this plan.    An After Visit Summary was printed and given to the patient at discharge, including (if requested) any available informative/educational handouts regarding diagnosis, treatment, or medications. All questions were answered to patient/family satisfaction. Should symptoms fail to improve or worsen they agree to call or return to clinic or to go to the Emergency Department. Discussed the importance of following up with any needed screening tests/labs/specialist appointments and any requested follow-up recommended by me today. Importance of maintaining follow-up discussed and patient accepts that missed appointments can delay diagnosis and potentially lead to worsening of conditions.    Return if symptoms worsen or fail to improve., or sooner if acute issues arise.    This document has been electronically signed by Saji Minaya DPM on July 31, 2024 13:28 EDT

## 2024-08-27 ENCOUNTER — OFFICE VISIT (OUTPATIENT)
Dept: INTERNAL MEDICINE | Facility: CLINIC | Age: 39
End: 2024-08-27
Payer: COMMERCIAL

## 2024-08-27 VITALS
HEIGHT: 65 IN | HEART RATE: 81 BPM | OXYGEN SATURATION: 98 % | BODY MASS INDEX: 24.83 KG/M2 | WEIGHT: 149 LBS | SYSTOLIC BLOOD PRESSURE: 102 MMHG | TEMPERATURE: 97.8 F | RESPIRATION RATE: 18 BRPM | DIASTOLIC BLOOD PRESSURE: 62 MMHG

## 2024-08-27 DIAGNOSIS — R51.9 NONINTRACTABLE HEADACHE, UNSPECIFIED CHRONICITY PATTERN, UNSPECIFIED HEADACHE TYPE: ICD-10-CM

## 2024-08-27 DIAGNOSIS — M79.672 BILATERAL FOOT PAIN: ICD-10-CM

## 2024-08-27 DIAGNOSIS — M25.532 LEFT WRIST PAIN: Primary | ICD-10-CM

## 2024-08-27 DIAGNOSIS — M79.671 BILATERAL FOOT PAIN: ICD-10-CM

## 2024-08-27 DIAGNOSIS — M79.642 LEFT HAND PAIN: ICD-10-CM

## 2024-08-27 DIAGNOSIS — Z51.81 MEDICATION MONITORING ENCOUNTER: ICD-10-CM

## 2024-08-27 DIAGNOSIS — M25.512 ACUTE PAIN OF LEFT SHOULDER: ICD-10-CM

## 2024-08-27 LAB
AMPHET+METHAMPHET UR QL: POSITIVE
AMPHETAMINE INTERNAL CONTROL: ABNORMAL
AMPHETAMINES UR QL: NEGATIVE
BARBITURATE INTERNAL CONTROL: ABNORMAL
BARBITURATES UR QL SCN: POSITIVE
BENZODIAZ UR QL SCN: NEGATIVE
BENZODIAZEPINE INTERNAL CONTROL: ABNORMAL
BUPRENORPHINE INTERNAL CONTROL: ABNORMAL
BUPRENORPHINE SERPL-MCNC: NEGATIVE NG/ML
CANNABINOIDS SERPL QL: NEGATIVE
COCAINE INTERNAL CONTROL: ABNORMAL
COCAINE UR QL: NEGATIVE
EXPIRATION DATE: ABNORMAL
Lab: ABNORMAL
MDMA (ECSTASY) INTERNAL CONTROL: ABNORMAL
MDMA UR QL SCN: NEGATIVE
METHADONE INTERNAL CONTROL: ABNORMAL
METHADONE UR QL SCN: NEGATIVE
METHAMPHETAMINE INTERNAL CONTROL: ABNORMAL
MORPHINE INTERNAL CONTROL: ABNORMAL
MORPHINE/OPIATES SCREEN, URINE: NEGATIVE
OXYCODONE INTERNAL CONTROL: ABNORMAL
OXYCODONE UR QL SCN: NEGATIVE
PCP UR QL SCN: NEGATIVE
PHENCYCLIDINE INTERNAL CONTROL: ABNORMAL
THC INTERNAL CONTROL: ABNORMAL

## 2024-08-27 PROCEDURE — 99214 OFFICE O/P EST MOD 30 MIN: CPT | Performed by: NURSE PRACTITIONER

## 2024-08-27 PROCEDURE — 1125F AMNT PAIN NOTED PAIN PRSNT: CPT | Performed by: NURSE PRACTITIONER

## 2024-08-27 RX ORDER — ACYCLOVIR 400 MG/1
400 TABLET ORAL 3 TIMES DAILY
Qty: 30 TABLET | Refills: 2 | Status: SHIPPED | OUTPATIENT
Start: 2024-08-27

## 2024-08-27 RX ORDER — BUTALBITAL, ACETAMINOPHEN AND CAFFEINE 50; 325; 40 MG/1; MG/1; MG/1
1 TABLET ORAL DAILY PRN
Qty: 30 TABLET | Refills: 0 | Status: SHIPPED | OUTPATIENT
Start: 2024-08-27

## 2024-08-27 NOTE — PROGRESS NOTES
"Chief Complaint  Foot Pain (Bilateral foot pain follow up ), Hand Pain (Wrist and fingers- 2 months), Shoulder Pain (Left shoulder pain- 2 months), and Mouth Lesions (Requesting refill for valaciclovir for cold sores)    Subjective          Audra Rendon presents to [facility]  History of Present Illness  She reports having more pain in hands, wrists, and bilat shoulder pain  Has PT on right shoulder which has improved but now occurring in the left shoulder  Pain in the joints of her index bilaterally  She has noticed some swelling at times.   She reports occasionally paresthesia in her left arm, intermittent. Feels like this is coming from her neck.     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 Has used less than 30 fioricet over the last year.   Work well when this occurs  Quit drinking sodas so thinks these may be caffeine headaches    She reports feet hurting off and on. Saw Dr. Collazo but reports that she feels like he did not listen  Objective   Vital Signs:   /62 (BP Location: Left arm, Patient Position: Sitting, Cuff Size: Adult)   Pulse 81   Temp 97.8 °F (36.6 °C)   Resp 18   Ht 165.1 cm (65\")   Wt 67.6 kg (149 lb)   SpO2 98%   BMI 24.79 kg/m²     Physical Exam  Vitals and nursing note reviewed.   Constitutional:       General: She is not in acute distress.     Appearance: Normal appearance.   HENT:      Head: Normocephalic and atraumatic.      Right Ear: External ear normal.      Left Ear: External ear normal.      Nose: Nose normal.      Mouth/Throat:      Mouth: Mucous membranes are moist.   Eyes:      Conjunctiva/sclera: Conjunctivae normal.   Cardiovascular:      Rate and Rhythm: Normal rate and regular rhythm.      Pulses: Normal pulses.      Heart sounds: Normal heart sounds. No murmur heard.     No friction rub. No gallop.   Pulmonary:      Effort: Pulmonary effort is normal. No " respiratory distress.      Breath sounds: No wheezing, rhonchi or rales.   Musculoskeletal:      Left shoulder: No swelling or deformity. Decreased range of motion.      Left hand: Bony tenderness present. Normal range of motion.      Cervical back: Neck supple.      Right lower leg: No edema.      Left lower leg: No edema.      Comments: +neer's left shoulder  Tenderness of PIP and MCP joint of second digit left hand   Skin:     General: Skin is warm and dry.   Neurological:      General: No focal deficit present.      Mental Status: She is alert and oriented to person, place, and time.   Psychiatric:         Mood and Affect: Mood normal.         Behavior: Behavior normal.        Result Review :          Procedures      Assessment and Plan    Diagnoses and all orders for this visit:    1. Left wrist pain (Primary)  Comments:  X-ray in office  Orders:  -     XR Wrist 3+ View Left  -     XR Hand 3+ View Left    2. Left hand pain  Comments:  X-ray in office  Orders:  -     XR Wrist 3+ View Left  -     XR Hand 3+ View Left    3. Acute pain of left shoulder  Comments:  Suspicion spacious for nerve impingement.  Sending for PT  Orders:  -     Ambulatory Referral to Physical Therapy for Evaluation & Treatment    4. Nonintractable headache, unspecified chronicity pattern, unspecified headache type  Comments:  UDS today.  Mervin and controlled agreement reviewed.  Refilling Fioricet    5. Bilateral foot pain  Comments:  Will send to Dr. Forbes for second opinion per patient request  Orders:  -     Ambulatory Referral to Podiatry    6. Medication monitoring encounter  -     POC 12 Panel Urine Drug Screen    Other orders  -     butalbital-acetaminophen-caffeine (Fioricet) -40 MG per tablet; Take 1 tablet by mouth Daily As Needed for Headache. May repeat in 2 hrs if headache not improved. Max 2 in 24 hr period  Dispense: 30 tablet; Refill: 0  -     acyclovir (ZOVIRAX) 400 MG tablet; Take 1 tablet by mouth 3 (Three) Times a  Day. Take no more than 5 doses a day.  Dispense: 30 tablet; Refill: 2    Autoimmune labs reviewed which were unremarkable  Will continue to keep track of foods that exacerbate symptoms.  Would recommend avoiding foods that worsen these.          Follow Up   Return in about 8 weeks (around 10/22/2024).  Patient was given instructions and counseling regarding her condition or for health maintenance advice. Please see specific information pulled into the AVS if appropriate.

## 2024-09-09 NOTE — PROGRESS NOTES
Chief Complaint   Patient presents with    Annual Exam       HPI:   39 y.o. . Presents for well woman exam. s/p HYST BSO, benign indications  Menses:  none   Pain:  None  Last pap: normal IGP,rfx Aptima HPV All Pth (2023 14:41)  Complaints: Pt has no complaints today.    Patient reports that she denies urinary incontinence s/p bladder sling     Experiencing vaginal dryness, denies painful intercourse, desires STI screen    Past Medical History:   Diagnosis Date    Abnormal Pap smear of cervix     Allergies     Anemia     Anxiety and depression     Asthma     Bipolar 1 disorder     Cystic acne     Foot pain, bilateral     Gall stones     Head injury     Hearing loss     Intrinsic sphincter deficiency (ISD)     Iron deficiency     Kidney calculi     Migraine     Migraine headache     Ovarian cyst     Stress incontinence     Thyroid disorder     Thyroid dysfunction, postpartum (-ER- AGAIN) (3-2014-TO )    RESOLVED    Tinnitus     Urinary tract infection       Past Surgical History:   Procedure Laterality Date    D & C HYSTEROSCOPY ENDOMETRIAL ABLATION N/A 06/15/2017    Procedure: DILATATION AND CURETTAGE HYSTEROSCOPY NOVASURE ENDOMETRIAL ABLATION;  Surgeon: Hebert Wallace MD;  Location: [facility];  Service:     DILATATION AND CURETTAGE  2008    LAPAROSCOPIC CHOLECYSTECTOMY  2007    LASER ABLATION      TRANSVAGINAL TAPING SUSPENSION N/A 06/15/2017    Procedure: RETROPUBIC SLING W/CYSTOSCOPY ;  Surgeon: Hebert Wallace MD;  Location: [facility];  Service:     TUBAL ABDOMINAL LIGATION      VAGINAL HYSTERECTOMY  2019    WISDOM TOOTH EXTRACTION        Family History   Problem Relation Age of Onset    Colon polyps Mother     Heart disease Paternal Grandfather     Leukemia Maternal Grandmother     Prostate cancer Maternal Grandfather     Colon cancer Maternal Aunt 55    Cervical cancer Paternal Aunt     Malig Hyperthermia Neg Hx      Allergies as of 10/03/2024 - Reviewed  "10/03/2024   Allergen Reaction Noted    Milk-related compounds GI Intolerance 10/30/2020    Morphine Unknown - High Severity 09/21/2021        PCP: does manage PMHx and preventative labs    /82   Pulse 89   Ht 165.1 cm (65\")   Wt 65.8 kg (145 lb)   LMP 05/24/2017   Breastfeeding No   BMI 24.13 kg/m²     PHYSICAL EXAM: Chaperone present   General- NAD, alert and oriented, appropriate  Psych- Normal mood, good memory  Neck- No masses, no thyroid enlargement  Lymphatic- No palpable neck, axillary, or groin nodes  CV- Regular rhythm, no murmurs  Resp- CTA to bases, no wheezes  Abdomen- Soft, non distended, non tender, no masses  Breast left-  Bilaterally symmetrical, no masses, non tender, no nipple discharge  Breast right- Bilaterally symmetrical, no masses, non tender, no nipple discharge  External genitalia- Normal female, no lesions  Urethra/meatus- Normal, no masses, non tender, no prolapse  Bladder- Normal, no masses, non tender, no prolapse  Vagina- Normal, no atrophy, no lesions, no discharge, no prolapse  Cvx- Absent  Uterus- Absent  Adnexa- No mass, non tender  Anus/Rectum/Perineum- Not performed  Ext- No edema, no cyanosis    Skin- No lesions, no rashes, no acanthosis nigricans    ASSESSMENT and PLAN:    Diagnoses and all orders for this visit:    1. Well woman exam (Primary)    2. Encounter for screening mammogram for malignant neoplasm of breast  -     Mammo Screening Digital Tomosynthesis Bilateral With CAD; Future    3. Screen for STD (sexually transmitted disease)  -     Chlamydia trachomatis, Neisseria gonorrhoeae, PCR - Swab, Cervix  -     Gardnerella vaginalis, Trichomonas vaginalis, Candida albicans, DNA - Swab, Vagina  -     RPR, Rfx Qn RPR / Confirm TP  -     Hepatitis B Surface Antigen  -     Hepatitis C Antibody  -     HIV-1 / O / 2 Ag / Antibody 4th Generation  -     HSV 1 & 2 - Specific Antibody, IgG      Preventative:   BREAST HEALTH- Monthly self breast exam importance and how to " reviewed. MMG and/or MRI (prn) reviewed per society guidelines and her individual history. Screening Imaging: Updated today  CERVICAL CANCER Screening- Reviewed current ASCCP guidelines for screening w and wo cotest HPV, age specific.  Screen: Not medically needed  COLON CANCER Screening- Reviewed current medical society guidelines and options.  Screen:  Not medically needed  SEXUAL HEALTH: STD screening desired.  Ordered,  Safe sex and condoms  BONE HEALTH- Reviewed current medical society guidelines and options for testing, calcium and vit D intake.  Weight bearing exercise.  DEXA: Not medically needed  VACCINATIONS Recommended: COVID and booster PRN, Flu annually, Gardisil/HPV vaccine (up to 46yo)  Importance discussed, risk being unvaccinated reviewed.  Questions answered  Genetic testing: Does not qualify.  Smoking status- NON SMOKER  Follow up PCP/Specialist PMHx and Labs  Not interested in vaginal estrogen therapy, provided handout with recommended daily vaginal moisturizers.      She understands the importance of having any ordered tests to be performed in a timely fashion.  The risks of not performing them include, but are not limited to, advanced cancer stages, bone loss from osteoporosis and/or subsequent increase in morbidity and/or mortality.  She is encouraged to review her results online and/or contact or office if she has questions.       Follow Up:  Return in about 1 year (around 10/3/2025).        Morelia Mckeon, APRN  10/03/2024

## 2024-09-19 ENCOUNTER — PRIOR AUTHORIZATION (OUTPATIENT)
Dept: INTERNAL MEDICINE | Facility: CLINIC | Age: 39
End: 2024-09-19
Payer: COMMERCIAL

## 2024-10-03 ENCOUNTER — OFFICE VISIT (OUTPATIENT)
Dept: OBSTETRICS AND GYNECOLOGY | Facility: CLINIC | Age: 39
End: 2024-10-03
Payer: COMMERCIAL

## 2024-10-03 VITALS
SYSTOLIC BLOOD PRESSURE: 117 MMHG | DIASTOLIC BLOOD PRESSURE: 82 MMHG | HEIGHT: 65 IN | HEART RATE: 89 BPM | WEIGHT: 145 LBS | BODY MASS INDEX: 24.16 KG/M2

## 2024-10-03 DIAGNOSIS — Z12.31 ENCOUNTER FOR SCREENING MAMMOGRAM FOR MALIGNANT NEOPLASM OF BREAST: ICD-10-CM

## 2024-10-03 DIAGNOSIS — Z01.419 WELL WOMAN EXAM: Primary | ICD-10-CM

## 2024-10-03 DIAGNOSIS — Z11.3 SCREEN FOR STD (SEXUALLY TRANSMITTED DISEASE): ICD-10-CM

## 2024-10-03 LAB
C TRACH RRNA CVX QL NAA+PROBE: NOT DETECTED
CANDIDA SPECIES: NEGATIVE
GARDNERELLA VAGINALIS: NEGATIVE
HBV SURFACE AG SERPL QL IA: NORMAL
HCV AB SER QL: NORMAL
HIV 1+2 AB+HIV1 P24 AG SERPL QL IA: NORMAL
N GONORRHOEA RRNA SPEC QL NAA+PROBE: NOT DETECTED
T VAGINALIS DNA VAG QL PROBE+SIG AMP: NEGATIVE

## 2024-10-03 PROCEDURE — 87491 CHLMYD TRACH DNA AMP PROBE: CPT | Performed by: NURSE PRACTITIONER

## 2024-10-03 PROCEDURE — 86696 HERPES SIMPLEX TYPE 2 TEST: CPT | Performed by: NURSE PRACTITIONER

## 2024-10-03 PROCEDURE — 87340 HEPATITIS B SURFACE AG IA: CPT | Performed by: NURSE PRACTITIONER

## 2024-10-03 PROCEDURE — 87510 GARDNER VAG DNA DIR PROBE: CPT | Performed by: NURSE PRACTITIONER

## 2024-10-03 PROCEDURE — G0432 EIA HIV-1/HIV-2 SCREEN: HCPCS | Performed by: NURSE PRACTITIONER

## 2024-10-03 PROCEDURE — 86695 HERPES SIMPLEX TYPE 1 TEST: CPT | Performed by: NURSE PRACTITIONER

## 2024-10-03 PROCEDURE — 86592 SYPHILIS TEST NON-TREP QUAL: CPT | Performed by: NURSE PRACTITIONER

## 2024-10-03 PROCEDURE — 87480 CANDIDA DNA DIR PROBE: CPT | Performed by: NURSE PRACTITIONER

## 2024-10-03 PROCEDURE — 87591 N.GONORRHOEAE DNA AMP PROB: CPT | Performed by: NURSE PRACTITIONER

## 2024-10-03 PROCEDURE — 86803 HEPATITIS C AB TEST: CPT | Performed by: NURSE PRACTITIONER

## 2024-10-03 PROCEDURE — 87660 TRICHOMONAS VAGIN DIR PROBE: CPT | Performed by: NURSE PRACTITIONER

## 2024-10-03 RX ORDER — LAMOTRIGINE 100 MG/1
1 TABLET ORAL DAILY
COMMUNITY
Start: 2024-08-30

## 2024-10-03 NOTE — PATIENT INSTRUCTIONS
Venipuncture Blood Specimen Collection  Venipuncture performed in left arm by Aleja Munoz with good hemostasis. Patient tolerated the procedure well without complications.   10/03/24   Aleja Munoz

## 2024-10-05 LAB
HSV1 IGG SER IA-ACNC: 29.7 INDEX (ref 0–0.9)
HSV2 IGG SER IA-ACNC: <0.91 INDEX (ref 0–0.9)
RPR SER QL: NON REACTIVE

## 2024-10-24 ENCOUNTER — OFFICE VISIT (OUTPATIENT)
Dept: INTERNAL MEDICINE | Facility: CLINIC | Age: 39
End: 2024-10-24
Payer: COMMERCIAL

## 2024-10-24 VITALS
TEMPERATURE: 97.7 F | HEART RATE: 89 BPM | BODY MASS INDEX: 23.82 KG/M2 | OXYGEN SATURATION: 100 % | HEIGHT: 65 IN | WEIGHT: 143 LBS | RESPIRATION RATE: 18 BRPM | DIASTOLIC BLOOD PRESSURE: 68 MMHG | SYSTOLIC BLOOD PRESSURE: 116 MMHG

## 2024-10-24 DIAGNOSIS — J45.20 MILD INTERMITTENT ASTHMA, UNSPECIFIED WHETHER COMPLICATED: ICD-10-CM

## 2024-10-24 DIAGNOSIS — N95.1 PERIMENOPAUSE: Primary | ICD-10-CM

## 2024-10-24 DIAGNOSIS — F51.01 PRIMARY INSOMNIA: ICD-10-CM

## 2024-10-24 DIAGNOSIS — R51.9 NONINTRACTABLE HEADACHE, UNSPECIFIED CHRONICITY PATTERN, UNSPECIFIED HEADACHE TYPE: ICD-10-CM

## 2024-10-24 DIAGNOSIS — J30.9 ALLERGIC RHINITIS, UNSPECIFIED SEASONALITY, UNSPECIFIED TRIGGER: ICD-10-CM

## 2024-10-24 PROCEDURE — 99214 OFFICE O/P EST MOD 30 MIN: CPT | Performed by: NURSE PRACTITIONER

## 2024-10-24 PROCEDURE — 1125F AMNT PAIN NOTED PAIN PRSNT: CPT | Performed by: NURSE PRACTITIONER

## 2024-10-24 RX ORDER — VITAMIN E 268 MG
400 CAPSULE ORAL DAILY
Qty: 90 CAPSULE | Refills: 0 | Status: SHIPPED | OUTPATIENT
Start: 2024-10-24

## 2024-10-24 RX ORDER — ALBUTEROL SULFATE 90 UG/1
2 INHALANT RESPIRATORY (INHALATION) EVERY 4 HOURS PRN
Qty: 8 G | Refills: 1 | Status: SHIPPED | OUTPATIENT
Start: 2024-10-24

## 2024-10-24 RX ORDER — MAGNESIUM OXIDE 400 MG/1
400 TABLET ORAL DAILY
Qty: 90 TABLET | Refills: 1 | Status: SHIPPED | OUTPATIENT
Start: 2024-10-24

## 2024-10-24 RX ORDER — ALBUTEROL SULFATE 90 UG/1
2 INHALANT RESPIRATORY (INHALATION) EVERY 4 HOURS PRN
COMMUNITY
End: 2024-10-24 | Stop reason: SDUPTHER

## 2024-10-24 NOTE — PROGRESS NOTES
Patient has an active PA, spoke with pharmacy and the inform me that medication went thru insurance.

## 2024-10-24 NOTE — PROGRESS NOTES
Chief Complaint  Migraine (8 weeks follow up. Fioricet needed PA. Patient has been with out Fioricet.)      Subjective      History of Present Illness  The patient is a 39-year-old female who presents today for a follow-up on her migraines. She reports that she has run out of Fioricet and was informed that a prior authorization (PA) is required for a refill.    She describes her headaches as pinchy and stinging, affecting her entire head. She has been taking Fioricet for years, typically a few times a month. Recently, she took Excedrin Migraine and ibuprofen, which provided minimal relief. She attributes her severe headaches to poor sleep quality and suspects that her sleep disturbances may be due to perimenopause or hormonal changes.    Her sleep pattern fluctuates between normal and insomnia, often staying awake until 4:00 AM and then sleeping for only an hour before going to work. She has been applying magnesium spray to the soles of her feet, which she believes helps with her sleep. She does not experience hot flashes.    She also reports breast pain, which she discussed during her recent well-woman visit. The pain is so severe that it prevents her from hugging people. Her OB/GYN suggested that her breast pain, headaches, and sleepiness might be hormone-related.    Additionally, she reports wheezing when bending over to  clothes, which was alleviated by using her inhaler. She has not refilled her inhaler prescription in a while.    She has started a new job, which she finds less stressful than her previous one, and reports an improvement in her mental health.    Supplemental Information:  Her foot pain completely went away when she quit drinking Cokes.         Objective   Vital Signs:   Vitals:    10/24/24 0805   BP: 116/68   BP Location: Right arm   Patient Position: Sitting   Cuff Size: Adult   Pulse: 89   Resp: 18   Temp: 97.7 °F (36.5 °C)   SpO2: 100%   Weight: 64.9 kg (143 lb)   Height: 165.1 cm  "(65\")     Body mass index is 23.8 kg/m².    Wt Readings from Last 3 Encounters:   10/24/24 64.9 kg (143 lb)   10/03/24 65.8 kg (145 lb)   08/27/24 67.6 kg (149 lb)     BP Readings from Last 3 Encounters:   10/24/24 116/68   10/03/24 117/82   08/27/24 102/62       Health Maintenance   Topic Date Due    MAMMOGRAM  Never done    Pneumococcal Vaccine 0-64 (1 of 2 - PCV) Never done    ANNUAL PHYSICAL  08/22/2024    COVID-19 Vaccine (1 - 2023-24 season) Never done    INFLUENZA VACCINE  11/24/2024 (Originally 8/1/2024)    PAP SMEAR  02/01/2026    TDAP/TD VACCINES (3 - Td or Tdap) 10/30/2030    HEPATITIS C SCREENING  Completed       Physical Exam  Vitals and nursing note reviewed.   Constitutional:       General: She is not in acute distress.     Appearance: Normal appearance.   HENT:      Head: Normocephalic and atraumatic.      Right Ear: External ear normal.      Left Ear: External ear normal.      Nose: Nose normal.      Mouth/Throat:      Mouth: Mucous membranes are moist.   Eyes:      Conjunctiva/sclera: Conjunctivae normal.   Cardiovascular:      Rate and Rhythm: Normal rate and regular rhythm.      Pulses: Normal pulses.      Heart sounds: Normal heart sounds. No murmur heard.     No friction rub. No gallop.   Pulmonary:      Effort: Pulmonary effort is normal. No respiratory distress.      Breath sounds: No wheezing, rhonchi or rales.   Musculoskeletal:      Cervical back: Neck supple.      Right lower leg: No edema.      Left lower leg: No edema.   Skin:     General: Skin is warm and dry.   Neurological:      General: No focal deficit present.      Mental Status: She is alert and oriented to person, place, and time.   Psychiatric:         Mood and Affect: Mood normal.         Behavior: Behavior normal.        Physical Exam        Result Review :  The following data was reviewed by: MINDA Luu on 10/24/2024:         Results      BMI is within normal parameters. No other follow-up for BMI required.   "     Procedures            Assessment & Plan  Perimenopause    Nonintractable headache, unspecified chronicity pattern, unspecified headache type    Allergic rhinitis, unspecified seasonality, unspecified trigger    Primary insomnia    Mild intermittent asthma, unspecified whether complicated             New Medications Ordered This Visit   Medications    albuterol sulfate  (90 Base) MCG/ACT inhaler     Sig: Inhale 2 puffs Every 4 (Four) Hours As Needed for Wheezing.     Dispense:  8 g     Refill:  1    vitamin E 400 UNIT capsule     Sig: Take 1 capsule by mouth Daily.     Dispense:  90 capsule     Refill:  0    magnesium oxide (MAG-OX) 400 MG tablet     Sig: Take 1 tablet by mouth Daily.     Dispense:  90 tablet     Refill:  1          Assessment & Plan  1. Migraine.  She reports persistent headaches, which she attributes to lack of sleep and possible hormonal changes. Excedrin migraine and ibuprofen provided minimal relief. Will continue with fioricet. She will also try a magnesium supplement daily for sleep and migraine prevention.    2. Breast tenderness.  She experiences significant breast pain, which she believes is related to hormonal changes. She is advised to take vitamin E 400 units daily to help alleviate the breast pain.    3. Asthma.  She reports occasional wheezing, particularly when bending over. A refill for albuterol will be provided.    4. Perimenopausal symptoms.  She reports symptoms including sleep disturbances, breast tenderness, and dryness. She has been provided with a sheet of over-the-counter and prescription options for managing these symptoms by her OB.     5. Health Maintenance.  She is scheduled to receive her influenza vaccine at work. A pneumonia vaccine (Prevnar 20) was recommended due to her asthma history, but she will consider it.    Follow-up  Return in 3 months for follow up.    Patient or patient representative verbalized consent for the use of Ambient Listening during  the visit with  MINDA Luu for chart documentation. 10/24/2024  14:16 EDT      FOLLOW UP  Return in about 3 months (around 1/24/2025).  Patient was given instructions and counseling regarding her condition or for health maintenance advice. Please see specific information pulled into the AVS if appropriate.     MINDA Luu  10/24/24  14:19 EDT    CURRENT & DISCONTINUED MEDICATIONS  Current Outpatient Medications   Medication Instructions    acyclovir (ZOVIRAX) 400 mg, Oral, 3 Times Daily, Take no more than 5 doses a day.    albuterol sulfate  (90 Base) MCG/ACT inhaler 2 puffs, Inhalation, Every 4 Hours PRN    busPIRone (BUSPAR) 7.5 mg, As Needed    butalbital-acetaminophen-caffeine (Fioricet) -40 MG per tablet 1 tablet, Oral, Daily PRN, May repeat in 2 hrs if headache not improved. Max 2 in 24 hr period    lamoTRIgine (LaMICtal) 100 MG tablet 1 tablet, Daily    lisdexamfetamine (VYVANSE) 60 mg, Every Morning    magnesium oxide (MAG-OX) 400 mg, Oral, Daily    Vitamin D-Vitamin K (VITAMIN K2-VITAMIN D3 PO) 1 each, Daily    vitamin E (VITAMIN E) 400 Units, Oral, Daily       Medications Discontinued During This Encounter   Medication Reason    TART CHERRY PO *Therapy completed    albuterol sulfate  (90 Base) MCG/ACT inhaler Reorder

## 2024-12-04 ENCOUNTER — TELEPHONE (OUTPATIENT)
Dept: OBSTETRICS AND GYNECOLOGY | Facility: CLINIC | Age: 39
End: 2024-12-04
Payer: COMMERCIAL

## 2024-12-20 ENCOUNTER — HOSPITAL ENCOUNTER (EMERGENCY)
Facility: HOSPITAL | Age: 39
Discharge: HOME | End: 2024-12-20
Attending: EMERGENCY MEDICINE
Payer: COMMERCIAL

## 2024-12-20 ENCOUNTER — APPOINTMENT (OUTPATIENT)
Dept: GENERAL RADIOLOGY | Facility: HOSPITAL | Age: 39
End: 2024-12-20
Payer: COMMERCIAL

## 2024-12-20 VITALS
TEMPERATURE: 98.2 F | RESPIRATION RATE: 18 BRPM | DIASTOLIC BLOOD PRESSURE: 87 MMHG | SYSTOLIC BLOOD PRESSURE: 119 MMHG | HEIGHT: 65 IN | OXYGEN SATURATION: 100 % | WEIGHT: 160.5 LBS | BODY MASS INDEX: 26.74 KG/M2 | HEART RATE: 90 BPM

## 2024-12-20 DIAGNOSIS — I47.10 PSVT (PAROXYSMAL SUPRAVENTRICULAR TACHYCARDIA): Primary | ICD-10-CM

## 2024-12-20 LAB
ALBUMIN SERPL-MCNC: 4.6 G/DL (ref 3.5–5.2)
ALBUMIN/GLOB SERPL: 1.8 G/DL
ALP SERPL-CCNC: 57 U/L (ref 39–117)
ALT SERPL W P-5'-P-CCNC: 12 U/L (ref 1–33)
ANION GAP SERPL CALCULATED.3IONS-SCNC: 14.9 MMOL/L (ref 5–15)
AST SERPL-CCNC: 16 U/L (ref 1–32)
BASOPHILS # BLD AUTO: 0.03 10*3/MM3 (ref 0–0.2)
BASOPHILS NFR BLD AUTO: 0.5 % (ref 0–1.5)
BILIRUB SERPL-MCNC: 0.6 MG/DL (ref 0–1.2)
BILIRUB UR QL STRIP: NEGATIVE
BUN SERPL-MCNC: 7 MG/DL (ref 6–20)
BUN/CREAT SERPL: 9.1 (ref 7–25)
CALCIUM SPEC-SCNC: 9.5 MG/DL (ref 8.6–10.5)
CHLORIDE SERPL-SCNC: 100 MMOL/L (ref 98–107)
CLARITY UR: CLEAR
CO2 SERPL-SCNC: 21.1 MMOL/L (ref 22–29)
COLOR UR: YELLOW
CREAT SERPL-MCNC: 0.77 MG/DL (ref 0.57–1)
DEPRECATED RDW RBC AUTO: 38 FL (ref 37–54)
EGFRCR SERPLBLD CKD-EPI 2021: 100.2 ML/MIN/1.73
EOSINOPHIL # BLD AUTO: 0.08 10*3/MM3 (ref 0–0.4)
EOSINOPHIL NFR BLD AUTO: 1.4 % (ref 0.3–6.2)
ERYTHROCYTE [DISTWIDTH] IN BLOOD BY AUTOMATED COUNT: 12.5 % (ref 12.3–15.4)
GEN 5 1HR TROPONIN T REFLEX: <6 NG/L
GLOBULIN UR ELPH-MCNC: 2.6 GM/DL
GLUCOSE SERPL-MCNC: 99 MG/DL (ref 65–99)
GLUCOSE UR STRIP-MCNC: NEGATIVE MG/DL
HCT VFR BLD AUTO: 41 % (ref 34–46.6)
HGB BLD-MCNC: 13.6 G/DL (ref 12–15.9)
HGB UR QL STRIP.AUTO: NEGATIVE
HOLD SPECIMEN: NORMAL
IMM GRANULOCYTES # BLD AUTO: 0.02 10*3/MM3 (ref 0–0.05)
IMM GRANULOCYTES NFR BLD AUTO: 0.4 % (ref 0–0.5)
KETONES UR QL STRIP: NEGATIVE
LEUKOCYTE ESTERASE UR QL STRIP.AUTO: NEGATIVE
LYMPHOCYTES # BLD AUTO: 1.57 10*3/MM3 (ref 0.7–3.1)
LYMPHOCYTES NFR BLD AUTO: 28.2 % (ref 19.6–45.3)
MAGNESIUM SERPL-MCNC: 1.9 MG/DL (ref 1.6–2.6)
MCH RBC QN AUTO: 28 PG (ref 26.6–33)
MCHC RBC AUTO-ENTMCNC: 33.2 G/DL (ref 31.5–35.7)
MCV RBC AUTO: 84.4 FL (ref 79–97)
MONOCYTES # BLD AUTO: 0.36 10*3/MM3 (ref 0.1–0.9)
MONOCYTES NFR BLD AUTO: 6.5 % (ref 5–12)
NEUTROPHILS NFR BLD AUTO: 3.51 10*3/MM3 (ref 1.7–7)
NEUTROPHILS NFR BLD AUTO: 63 % (ref 42.7–76)
NITRITE UR QL STRIP: NEGATIVE
NRBC BLD AUTO-RTO: 0 /100 WBC (ref 0–0.2)
PH UR STRIP.AUTO: 8 [PH] (ref 5–8)
PLATELET # BLD AUTO: 316 10*3/MM3 (ref 140–450)
PMV BLD AUTO: 9.8 FL (ref 6–12)
POTASSIUM SERPL-SCNC: 3.9 MMOL/L (ref 3.5–5.2)
PROT SERPL-MCNC: 7.2 G/DL (ref 6–8.5)
PROT UR QL STRIP: NEGATIVE
QT INTERVAL: 408 MS
QTC INTERVAL: 541 MS
RBC # BLD AUTO: 4.86 10*6/MM3 (ref 3.77–5.28)
SODIUM SERPL-SCNC: 136 MMOL/L (ref 136–145)
SP GR UR STRIP: 1.01 (ref 1–1.03)
TROPONIN T NUMERIC DELTA: NORMAL
TROPONIN T SERPL HS-MCNC: 7 NG/L
TSH SERPL DL<=0.05 MIU/L-ACNC: 1.2 UIU/ML (ref 0.27–4.2)
UROBILINOGEN UR QL STRIP: NORMAL
WBC NRBC COR # BLD AUTO: 5.57 10*3/MM3 (ref 3.4–10.8)
WHOLE BLOOD HOLD COAG: NORMAL

## 2024-12-20 PROCEDURE — 93005 ELECTROCARDIOGRAM TRACING: CPT | Performed by: EMERGENCY MEDICINE

## 2024-12-20 PROCEDURE — 36415 COLL VENOUS BLD VENIPUNCTURE: CPT

## 2024-12-20 PROCEDURE — 80053 COMPREHEN METABOLIC PANEL: CPT | Performed by: EMERGENCY MEDICINE

## 2024-12-20 PROCEDURE — 84484 ASSAY OF TROPONIN QUANT: CPT | Performed by: EMERGENCY MEDICINE

## 2024-12-20 PROCEDURE — 85025 COMPLETE CBC W/AUTO DIFF WBC: CPT | Performed by: EMERGENCY MEDICINE

## 2024-12-20 PROCEDURE — 81003 URINALYSIS AUTO W/O SCOPE: CPT | Performed by: EMERGENCY MEDICINE

## 2024-12-20 PROCEDURE — 99284 EMERGENCY DEPT VISIT MOD MDM: CPT

## 2024-12-20 PROCEDURE — 84443 ASSAY THYROID STIM HORMONE: CPT | Performed by: EMERGENCY MEDICINE

## 2024-12-20 PROCEDURE — 71045 X-RAY EXAM CHEST 1 VIEW: CPT

## 2024-12-20 PROCEDURE — 83735 ASSAY OF MAGNESIUM: CPT | Performed by: EMERGENCY MEDICINE

## 2024-12-20 RX ORDER — METOPROLOL SUCCINATE 25 MG/1
25 TABLET, EXTENDED RELEASE ORAL DAILY
Qty: 30 TABLET | Refills: 2 | Status: SHIPPED | OUTPATIENT
Start: 2024-12-20

## 2024-12-20 RX ORDER — METOPROLOL SUCCINATE 25 MG/1
25 TABLET, EXTENDED RELEASE ORAL ONCE
Status: COMPLETED | OUTPATIENT
Start: 2024-12-20 | End: 2024-12-20

## 2024-12-20 RX ADMIN — METOPROLOL SUCCINATE 25 MG: 25 TABLET, EXTENDED RELEASE ORAL at 12:34

## 2024-12-20 NOTE — DISCHARGE INSTRUCTIONS
Resume normal home activities.  Continue your current home medications.  Take the Toprol prescription as directed.  Follow with your primary care provider in 1 week.  Discuss further outpatient workup.  Return to the ER for recurrent rapid heart rate, shortness of breath, or any other concerns issues that may arise.

## 2024-12-20 NOTE — Clinical Note
[facility] EMERGENCY ROOM  [address] [place] [address]  Phone: [phone]  Fax: [phone]    Audra Rendon was seen and treated in our emergency department on 12/20/2024.  She may return to work on 12/22/2024.         Thank you for choosing [facility].    Gregory Valdez,

## 2024-12-20 NOTE — ED PROVIDER NOTES
Time: 2:37 PM EST  Date of encounter:  12/20/2024  Independent Historian/Clinical History and Information was obtained by:   Patient and EMS  Chief Complaint: Rapid heart rate    History is limited by: N/A    History of Present Illness:  Patient is a 39 y.o. year old female who presents to the emergency department for evaluation of rapid heart rate.  Patient was at work when she abruptly became dizzy and lightheaded.  Patient says folic there was an elephant sitting on her chest.  Her heart was racing.  She also felt short of air.  Patient is that she is never had this before.  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  Patient went over to the urgent care area where they checked her heart rate and found it to be greater than 200.  EMS was called.  Patient denies any excessive caffeine intake.  She is on Vyvanse but this is unchanged.    HPI    Patient Care Team  Primary Care Provider: Lori Norman APRN    Past Medical History:     Allergies   Allergen Reactions    Milk-Related Compounds GI Intolerance     GI issue-intolerance    Morphine Unknown - High Severity     Past Medical History:   Diagnosis Date    Abnormal Pap smear of cervix     Allergies     Anemia     Anxiety and depression     Asthma     Bipolar 1 disorder     Cystic acne     Foot pain, bilateral     Gall stones     Head injury     Hearing loss     Intrinsic sphincter deficiency (ISD)     Iron deficiency     Kidney calculi     Migraine     Migraine headache     Ovarian cyst     Stress incontinence     Thyroid disorder     Thyroid dysfunction, postpartum (-XE- AGAIN) (3-2014-TO )    RESOLVED    Tinnitus     Urinary tract infection      Past Surgical History:   Procedure Laterality Date    D & C HYSTEROSCOPY ENDOMETRIAL ABLATION N/A 06/15/2017    Procedure: DILATATION AND CURETTAGE HYSTEROSCOPY NOVASURE ENDOMETRIAL ABLATION;  Surgeon: Hebert Wallace MD;  Location: [facility];  Service:     DILATATION AND  CURETTAGE  2008    LAPAROSCOPIC CHOLECYSTECTOMY  02/2007    LASER ABLATION      TRANSVAGINAL TAPING SUSPENSION N/A 06/15/2017    Procedure: RETROPUBIC SLING W/CYSTOSCOPY ;  Surgeon: Hebert Wallace MD;  Location: [facility] OR;  Service:     TUBAL ABDOMINAL LIGATION      VAGINAL HYSTERECTOMY  2019    WISDOM TOOTH EXTRACTION       Family History   Problem Relation Age of Onset    Colon polyps Mother     Heart disease Paternal Grandfather     Leukemia Maternal Grandmother     Prostate cancer Maternal Grandfather     Colon cancer Maternal Aunt 55    Cervical cancer Paternal Aunt     Malig Hyperthermia Neg Hx        Home Medications:  Prior to Admission medications    Medication Sig Start Date End Date Taking? Authorizing Provider   acyclovir (ZOVIRAX) 400 MG tablet Take 1 tablet by mouth 3 (Three) Times a Day. Take no more than 5 doses a day. 8/27/24   Lori Norman APRN   albuterol sulfate  (90 Base) MCG/ACT inhaler Inhale 2 puffs Every 4 (Four) Hours As Needed for Wheezing. 10/24/24   Lori Norman APRN   busPIRone (BUSPAR) 15 MG tablet Take 0.5 tablets by mouth As Needed. 9/29/21   Toño Pascual MD   butalbital-acetaminophen-caffeine (Fioricet) -40 MG per tablet Take 1 tablet by mouth Daily As Needed for Headache. May repeat in 2 hrs if headache not improved. Max 2 in 24 hr period 8/27/24   Lori Norman APRN   lamoTRIgine (LaMICtal) 100 MG tablet Take 1 tablet by mouth Daily. 8/30/24   Toño Pascual MD   lisdexamfetamine (VYVANSE) 60 MG capsule Take 1 capsule by mouth Every Morning 1/24/23   Toño Pascual MD   magnesium oxide (MAG-OX) 400 MG tablet Take 1 tablet by mouth Daily. 10/24/24   Lori Norman APRN   Vitamin D-Vitamin K (VITAMIN K2-VITAMIN D3 PO) Take 1 each by mouth Daily.    Toño Pascual MD   vitamin E 400 UNIT capsule Take 1 capsule by mouth Daily. 10/24/24   Lori Norman APRN        Social History:   Social History     Tobacco Use    Smoking  "status: Former     Current packs/day: 0.00     Average packs/day: 1.5 packs/day for 6.0 years (9.0 ttl pk-yrs)     Types: Cigarettes     Start date: 04/2008     Quit date: 04/2014     Years since quitting: 10.7    Smokeless tobacco: Never   Vaping Use    Vaping status: Never Used   Substance Use Topics    Alcohol use: Not Currently    Drug use: Not Currently     Types: Marijuana     Comment: as a teen         Review of Systems:  Review of Systems   Constitutional:  Negative for chills and fever.   HENT:  Negative for congestion, ear pain and sore throat.    Eyes:  Negative for pain.   Respiratory:  Negative for cough, chest tightness and shortness of breath.    Cardiovascular:  Positive for chest pain and palpitations.   Gastrointestinal:  Negative for abdominal pain, diarrhea, nausea and vomiting.   Genitourinary:  Negative for flank pain and hematuria.   Musculoskeletal:  Negative for joint swelling.   Skin:  Negative for pallor.   Neurological:  Positive for dizziness. Negative for seizures and headaches.   All other systems reviewed and are negative.       Physical Exam:  /87   Pulse 90   Temp 98.2 °F (36.8 °C)   Resp 18   Ht 165.1 cm (65\")   Wt 72.8 kg (160 lb 7.9 oz)   LMP 05/24/2017   SpO2 100%   BMI 26.71 kg/m²     Physical Exam    Vital signs were reviewed under triage note.  General appearance - Patient appears well-developed and well-nourished.  Patient is in no acute distress.  Head - Normocephalic, atraumatic.  Pupils - Equal, round, reactive to light.  Extraocular muscles are intact.  Conjunctiva is clear.  Nasal - Normal inspection.  No evidence of trauma or epistaxis.  Tympanic membranes - Gray, intact without erythema or retractions.  Oral mucosa - Pink and moist without lesions or erythema.  Uvula is midline.  Chest wall - Atraumatic.  Chest wall is nontender.  There are no vesicular rashes noted.  Neck - Supple.  Trachea was midline.  There is no palpable lymphadenopathy or " thyromegaly.  There are no meningeal signs  Lungs - Clear to auscultation and percussion bilaterally.  Heart - Regular rate and rhythm without any murmurs, clicks, or gallops.  Abdomen - Soft.  Bowel sounds are present.  There is no palpable tenderness.  There is no rebound, guarding, or rigidity.  There are no palpable masses.  There are no pulsatile masses.  Back - Spine is straight and midline.  There is no CVA tenderness.  Extremities - Intact x4 with full range of motion.  There is no palpable edema.  Pulses are intact x4 and equal.  Neurologic - Patient is awake, alert, and oriented x3.  Cranial nerves II through XII are grossly intact.  Motor and sensory functions grossly intact.  Cerebellar function was normal.  Integument - There are no rashes.  There are no petechia or purpura lesions noted.  There are no vesicular lesions noted.           Procedures:  Procedures      Medical Decision Making:      Comorbidities that affect care:    Migraines, bipolar disorder, anxiety, depression, asthma, kidney stones    External Notes reviewed:    Previous Clinic Note: Office visit with Lori Norman on 10/24/2024 was reviewed by me.      The following orders were placed and all results were independently analyzed by me:  Orders Placed This Encounter   Procedures    XR Chest 1 View    Comprehensive Metabolic Panel    High Sensitivity Troponin T    CBC Auto Differential    Urinalysis With Microscopic If Indicated (No Culture) - Urine, Clean Catch    High Sensitivity Troponin T 1Hr    Magnesium    TSH    ECG 12 Lead Tachycardia    CBC & Differential    Extra Tubes    Gold Top - SST    Light Blue Top       Medications Given in the Emergency Department:  Medications   metoprolol succinate XL (TOPROL-XL) 24 hr tablet 25 mg (25 mg Oral Given 12/20/24 1234)        ED Course:    ED Course as of 12/20/24 1444   Fri Dec 20, 2024   1316 EKG performed at [facility] was interpreted by me to show a sinus tachycardia with a ventricular rate of  105 bpm.  The CO interval is 130 ms.  P waves are normal.  QRS interval is widened at 152 ms.  Patient is some poor R wave progression.  Patient appears to have a left bundle branch block.  Axis is leftward -27 degrees.  There are some nonspecific ST-T wave change identified.  QT corrected was 429 ms. [TB]      ED Course User Index  [TB] Gregory Valdze DO       The patient was seen and evaluated in the ED by me.  The above history and physical examination was performed as documented.  Diagnostic data was obtained.  Results reviewed.  Findings were discussed with the patient.  It was noted that and EMS that she was in SVT with a rate of greater than 150.  Patient was administered 6 mg of adenosine rapid IV push and converted to a normal sinus rhythm.  Patient has remained in normal sinus rhythm since.  Patient was given a dose of Toprol XL here in the ED.  Patient is stable for discharge home but will need further outpatient workup.  I stressed the need for this to the patient.  Advised that she call her PCP to follow-up and complete the rest of the workup or be referred to cardiology.  I recommend she take Toprol daily until she has had the outpatient workup.    Labs:    Lab Results (last 24 hours)       Procedure Component Value Units Date/Time    CBC & Differential [[id number]]  (Normal) Collected: 12/20/24 1011    Specimen: Blood Updated: 12/20/24 1029    Narrative:      The following orders were created for panel order CBC & Differential.  Procedure                               Abnormality         Status                     ---------                               -----------         ------                     CBC Auto Differential[[id number]]        Normal              Final result                 Please view results for these tests on the individual orders.    Comprehensive Metabolic Panel [[id number]]  (Abnormal) Collected: 12/20/24 1011    Specimen: Blood Updated: 12/20/24 1046     Glucose 99 mg/dL      BUN 7  mg/dL      Creatinine 0.77 mg/dL      Sodium 136 mmol/L      Potassium 3.9 mmol/L      Chloride 100 mmol/L      CO2 21.1 mmol/L      Calcium 9.5 mg/dL      Total Protein 7.2 g/dL      Albumin 4.6 g/dL      ALT (SGPT) 12 U/L      AST (SGOT) 16 U/L      Alkaline Phosphatase 57 U/L      Total Bilirubin 0.6 mg/dL      Globulin 2.6 gm/dL      A/G Ratio 1.8 g/dL      BUN/Creatinine Ratio 9.1     Anion Gap 14.9 mmol/L      eGFR 100.2 mL/min/1.73     Narrative:      GFR Categories in Chronic Kidney Disease (CKD)      GFR Category          GFR (mL/min/1.73)    Interpretation  G1                     90 or greater         Normal or high (1)  G2                      60-89                Mild decrease (1)  G3a                   45-59                Mild to moderate decrease  G3b                   30-44                Moderate to severe decrease  G4                    15-29                Severe decrease  G5                    14 or less           Kidney failure          (1)In the absence of evidence of kidney disease, neither GFR category G1 or G2 fulfill the criteria for CKD.    eGFR calculation 2021 CKD-EPI creatinine equation, which does not include race as a factor    High Sensitivity Troponin T [[id number]]  (Normal) Collected: 12/20/24 1011    Specimen: Blood Updated: 12/20/24 1046     HS Troponin T 7 ng/L     Narrative:      High Sensitive Troponin T Reference Range:  <14.0 ng/L- Negative Female for AMI  <22.0 ng/L- Negative Male for AMI  >=14 - Abnormal Female indicating possible myocardial injury.  >=22 - Abnormal Male indicating possible myocardial injury.   Clinicians would have to utilize clinical acumen, EKG, Troponin, and serial changes to determine if it is an Acute Myocardial Infarction or myocardial injury due to an underlying chronic condition.         CBC Auto Differential [[id number]]  (Normal) Collected: 12/20/24 1011    Specimen: Blood Updated: 12/20/24 1029     WBC 5.57 10*3/mm3      RBC 4.86 10*6/mm3       Hemoglobin 13.6 g/dL      Hematocrit 41.0 %      MCV 84.4 fL      MCH 28.0 pg      MCHC 33.2 g/dL      RDW 12.5 %      RDW-SD 38.0 fl      MPV 9.8 fL      Platelets 316 10*3/mm3      Neutrophil % 63.0 %      Lymphocyte % 28.2 %      Monocyte % 6.5 %      Eosinophil % 1.4 %      Basophil % 0.5 %      Immature Grans % 0.4 %      Neutrophils, Absolute 3.51 10*3/mm3      Lymphocytes, Absolute 1.57 10*3/mm3      Monocytes, Absolute 0.36 10*3/mm3      Eosinophils, Absolute 0.08 10*3/mm3      Basophils, Absolute 0.03 10*3/mm3      Immature Grans, Absolute 0.02 10*3/mm3      nRBC 0.0 /100 WBC     Urinalysis With Microscopic If Indicated (No Culture) - Urine, Clean Catch [[id number]]  (Normal) Collected: 12/20/24 1102    Specimen: Urine, Clean Catch Updated: 12/20/24 1122     Color, UA Yellow     Appearance, UA Clear     pH, UA 8.0     Specific Gravity, UA 1.006     Glucose, UA Negative     Ketones, UA Negative     Bilirubin, UA Negative     Blood, UA Negative     Protein, UA Negative     Leuk Esterase, UA Negative     Nitrite, UA Negative     Urobilinogen, UA 0.2 E.U./dL    Narrative:      Urine microscopic not indicated.    High Sensitivity Troponin T 1Hr [[id number]] Collected: 12/20/24 1123    Specimen: Blood Updated: 12/20/24 1156     HS Troponin T <6 ng/L      Troponin T Numeric Delta --     Comment: Unable to calculate.       Narrative:      High Sensitive Troponin T Reference Range:  <14.0 ng/L- Negative Female for AMI  <22.0 ng/L- Negative Male for AMI  >=14 - Abnormal Female indicating possible myocardial injury.  >=22 - Abnormal Male indicating possible myocardial injury.   Clinicians would have to utilize clinical acumen, EKG, Troponin, and serial changes to determine if it is an Acute Myocardial Infarction or myocardial injury due to an underlying chronic condition.         Magnesium [[id number]]  (Normal) Collected: 12/20/24 1123    Specimen: Blood Updated: 12/20/24 1203     Magnesium 1.9 mg/dL     TSH [[id number]]   (Normal) Collected: 12/20/24 1123    Specimen: Blood Updated: 12/20/24 1217     TSH 1.200 uIU/mL              Imaging:    XR Chest 1 View    Result Date: 12/20/2024  XR CHEST 1 VW Date of Exam: 12/20/2024 10:14 AM EST Indication: Rapid Heart Rate Comparison: 3/7/2024 Findings: Heart size and pulmonary vessels are within normal limits. Lungs are clear. No pleural effusion. No pneumothorax. Bony structures are unremarkable.     Impression: 1. No acute cardiopulmonary disease. Electronically Signed: Bhavik Rosales MD  12/20/2024 10:32 AM EST  Workstation ID: [id number]       Differential Diagnosis and Discussion:    Palpitations: Differential diagnosis includes but is not limited to anxiety, atrioventricular blocks, mitral valve disease, hypoxia, coronary artery disease, hypokalemia, anemia, fever, COPD, congestive heart failure, pericarditis, Zia-Parkinson-White syndrome, pulmonary embolism, SVT, atrial fibrillation, atrial flutter, sinus tachycardia, thyrotoxicosis, and pheochromocytoma.    Labs were drawn in the emergency department and all labs were reviewed and interpreted by me.  X-ray were performed in the emergency department and all X-ray impressions were independently interpreted by me.  An EKG was performed and the EKG was interpreted by me.    MDM           Patient Care Considerations:    CT CHEST: I considered ordering a CT scan of the chest, however patient had a low probably Wells score and was not hypoxic.      Consultants/Shared Management Plan:    None    Social Determinants of Health:    Patient is independent, reliable, and has access to care.       Disposition and Care Coordination:    Discharged: I considered escalation of care by admitting this patient to the hospital, however no acute findings to warrant inpatient admission.    I have explained the patient´s condition, diagnoses and treatment plan based on the information available to me at this time. I have answered questions and addressed any  concerns. The patient has a good  understanding of the patient´s diagnosis, condition, and treatment plan as can be expected at this point. The vital signs have been stable. The patient´s condition is stable and appropriate for discharge from the emergency department.      The patient will pursue further outpatient evaluation with the primary care physician or other designated or consulting physician as outlined in the discharge instructions. They are agreeable to this plan of care and follow-up instructions have been explained in detail. The patient has received these instructions in written format and has expressed an understanding of the discharge instructions. The patient is aware that any significant change in condition or worsening of symptoms should prompt an immediate return to this or the closest emergency department or call to 911.  I have explained discharge medications and the need for follow up with the patient/caretakers. This was also printed in the discharge instructions. Patient was discharged with the following medications and follow up:      Medication List        New Prescriptions      metoprolol succinate XL 25 MG 24 hr tablet  Commonly known as: TOPROL-XL  Take 1 tablet by mouth Daily.               Where to Get Your Medications        These medications were sent [place] [facility] [place] - [address] - [phone] PH - [phone] FX  [address], [place] [address]      Phone: [phone]   metoprolol succinate XL 25 MG 24 hr tablet      Lori Norman, APRN  [address]  [address]  [place] [address] [phone]    In 1 week        Final diagnoses:   PSVT (paroxysmal supraventricular tachycardia)        ED Disposition       ED Disposition   Discharge    Condition   Stable    Comment   --               This medical record created using voice recognition software.             Gregory Valdez DO  12/20/24 6812

## 2024-12-26 LAB
QT INTERVAL: 408 MS
QTC INTERVAL: 541 MS

## 2025-01-07 ENCOUNTER — TELEPHONE (OUTPATIENT)
Dept: INTERNAL MEDICINE | Facility: CLINIC | Age: 40
End: 2025-01-07

## 2025-01-10 ENCOUNTER — TELEPHONE (OUTPATIENT)
Dept: CARDIOLOGY | Facility: CLINIC | Age: 40
End: 2025-01-10
Payer: COMMERCIAL

## 2025-01-10 NOTE — TELEPHONE ENCOUNTER
Caller: Audra Rendon    Relationship to patient: Self    Best call back number: [phone]     Chief complaint: ED F/U WITH SVT, SOB, CHEST PAIN    Type of visit: FOLLOW UP    Requested date: ASAP    If rescheduling, when is the original appointment:     Additional notes: PATIENT WAS AT WORK AND STARTING HAVING SHORTNESS OF BREATH, CHEST PAIN, ARM PAIN WITH FACIAL, HEART RATE 208. PATIENT WAS TAKING BY EMS TO THE [facility] AND WAS DIAGNOSES WITH SVT AND BBB.     PATIENT IS WANTING TO GET CHECK OUT. PLEASE CALL PATIENT TO SCHEDULE. THANK YOU!

## 2025-01-10 NOTE — TELEPHONE ENCOUNTER
[facility] patient. Patient was started on metoprolol 25 mg daily from ER, patient states she was unable to tolerate and stopped taking after 2 days. Encouraged patient to take metoprolol at bedtime. Patient Scheduled f/u with MINDA Villarreal.

## 2025-01-15 ENCOUNTER — OFFICE VISIT (OUTPATIENT)
Dept: CARDIOLOGY | Facility: CLINIC | Age: 40
End: 2025-01-15
Payer: COMMERCIAL

## 2025-01-15 VITALS
HEART RATE: 55 BPM | SYSTOLIC BLOOD PRESSURE: 108 MMHG | DIASTOLIC BLOOD PRESSURE: 71 MMHG | BODY MASS INDEX: 23.63 KG/M2 | WEIGHT: 142 LBS

## 2025-01-15 DIAGNOSIS — I44.7 LBBB (LEFT BUNDLE BRANCH BLOCK): ICD-10-CM

## 2025-01-15 DIAGNOSIS — I47.10 PAROXYSMAL SVT (SUPRAVENTRICULAR TACHYCARDIA): Primary | ICD-10-CM

## 2025-01-15 PROCEDURE — 99213 OFFICE O/P EST LOW 20 MIN: CPT | Performed by: FAMILY MEDICINE

## 2025-01-15 NOTE — PROGRESS NOTES
Chief Complaint  Follow-up and Chest Pain    Subjective        History of Present Illness  Audra Rendon presents to [facility] CARDIOLOGY   Ms. Rendon is a 39-year-old female patient coming in today for cardiac follow-up after ER evaluation on 12/20 for complaints of rapid heart rate.  She began feeling dizzy and lightheaded at work, and heart rate was racing, she initially went to urgent care with heart rate over 200 and was sent to hospital by EMS and recieved adenosine in route, and she converted to sinus rhythm and remained in sinus rhythm during her ER evaluation.  There was a left bundle branch block on EKG, but was not a new finding for her..  She was started on Toprol, but she did not tolerate this well and has since discontinued.  She was evaluated for symptoms of palpitations earlier this year, she had Holter monitor without any remarkable findings, but echocardiogram and stress test were not completed.       Past Medical History:   Diagnosis Date    Abnormal Pap smear of cervix     Allergies     Anemia     Anxiety and depression     Asthma     Bipolar 1 disorder     Cystic acne     Foot pain, bilateral     Gall stones     Head injury     Hearing loss     Intrinsic sphincter deficiency (ISD)     Iron deficiency     Kidney calculi     Migraine     Migraine headache     Ovarian cyst     Stress incontinence     Thyroid disorder     Thyroid dysfunction, postpartum (-AF- AGAIN) (3-2014-TO )    Tinnitus     Urinary tract infection        Allergies   Allergen Reactions    Milk-Related Compounds GI Intolerance     GI issue-intolerance    Morphine Unknown - High Severity        Past Surgical History:   Procedure Laterality Date    D & C HYSTEROSCOPY ENDOMETRIAL ABLATION N/A 06/15/2017    Procedure: DILATATION AND CURETTAGE HYSTEROSCOPY NOVASURE ENDOMETRIAL ABLATION;  Surgeon: Hebert Wallace MD;  Location: [facility];  Service:     DILATATION AND CURETTAGE  2008     LAPAROSCOPIC CHOLECYSTECTOMY  02/2007    LASER ABLATION      TRANSVAGINAL TAPING SUSPENSION N/A 06/15/2017    Procedure: RETROPUBIC SLING W/CYSTOSCOPY ;  Surgeon: Hebert Wallace MD;  Location: [facility];  Service:     TUBAL ABDOMINAL LIGATION      VAGINAL HYSTERECTOMY  2019    WISDOM TOOTH EXTRACTION          Social History  She  reports that she quit smoking about 10 years ago. Her smoking use included cigarettes. She started smoking about 16 years ago. She has a 9 pack-year smoking history. She has never used smokeless tobacco. She reports current alcohol use. She reports that she does not currently use drugs after having used the following drugs: Marijuana.    Family History  Her family history includes Cervical cancer in her paternal aunt; Colon cancer (age of onset: 55) in her maternal aunt; Colon polyps in her mother; Heart disease in her paternal grandfather; Leukemia in her maternal grandmother; Prostate cancer in her maternal grandfather.       Current Outpatient Medications on File Prior to Visit   Medication Sig    acyclovir (ZOVIRAX) 400 MG tablet Take 1 tablet by mouth 3 (Three) Times a Day. Take no more than 5 doses a day.    albuterol sulfate  (90 Base) MCG/ACT inhaler Inhale 2 puffs Every 4 (Four) Hours As Needed for Wheezing.    busPIRone (BUSPAR) 15 MG tablet Take 0.5 tablets by mouth As Needed.    butalbital-acetaminophen-caffeine (Fioricet) -40 MG per tablet Take 1 tablet by mouth Daily As Needed for Headache. May repeat in 2 hrs if headache not improved. Max 2 in 24 hr period    lamoTRIgine (LaMICtal) 100 MG tablet Take 1 tablet by mouth Daily.    lisdexamfetamine (VYVANSE) 60 MG capsule Take 1 capsule by mouth Every Morning    magnesium oxide (MAG-OX) 400 MG tablet Take 1 tablet by mouth Daily.    metoprolol succinate XL (TOPROL-XL) 25 MG 24 hr tablet Take 1 tablet by mouth Daily.    Vitamin D-Vitamin K (VITAMIN K2-VITAMIN D3 PO) Take 1 each by mouth Daily.    vitamin E  400 UNIT capsule Take 1 capsule by mouth Daily. (Patient not taking: Reported on 1/15/2025)     No current facility-administered medications on file prior to visit.         Review of Systems   Constitutional:  Negative for fatigue.   Respiratory:  Negative for cough, chest tightness and shortness of breath.    Cardiovascular:  Negative for chest pain, palpitations and leg swelling.   Gastrointestinal:  Negative for nausea and vomiting.   Neurological:  Negative for dizziness and syncope.        Objective   Vitals:    01/15/25 0854   BP: 108/71   Pulse: 55   Weight: 64.4 kg (142 lb)         Physical Exam  General : Alert, awake, no acute distress  Neck : Supple, no carotid bruit, no jugular venous distention  CVS : Regular rate and rhythm, no murmur, no rubs or gallops  Lungs: Clear to auscultation bilaterally, no crackles or rhonchi  Abdomen: Soft, nontender, bowel sounds active  Extremities: Warm, well-perfused, no pedal edema      Result Review     The following data was reviewed by MINDA Conner  proBNP   Date Value Ref Range Status   03/07/2024 <36.0 0.0 - 450.0 pg/mL Final     CMP          3/7/2024    20:35 6/25/2024    14:04 12/20/2024    10:11   CMP   Glucose 92  82  99    BUN 6  8  7    Creatinine 0.77  0.86  0.77    EGFR 100.8  87.7  100.2    Sodium 142  143  136    Potassium 3.9  4.5  3.9    Chloride 104  105  100    Calcium 9.3  9.5  9.5    Total Protein 7.1  7.4  7.2    Albumin 4.5  4.7  4.6    Globulin 2.6  2.7  2.6    Total Bilirubin 0.4  0.3  0.6    Alkaline Phosphatase 61  71  57    AST (SGOT) 15  11  16    ALT (SGPT) 14  10  12    Albumin/Globulin Ratio 1.7  1.7  1.8    BUN/Creatinine Ratio 7.8  9.3  9.1    Anion Gap 10.2  10.0  14.9      CBC w/diff          3/7/2024    20:35 6/25/2024    14:04 12/20/2024    10:11   CBC w/Diff   WBC 6.77  5.94  5.57    RBC 4.86  4.95  4.86    Hemoglobin 13.6  14.3  13.6    Hematocrit 40.8  42.2  41.0    MCV 84.0  85.3  84.4    MCH 28.0  28.9  28.0    MCHC  "33.3  33.9  33.2    RDW 12.5  12.2  12.5    Platelets 281  321  316    Neutrophil Rel % 64.9  63.4  63.0    Immature Granulocyte Rel % 0.4  0.3  0.4    Lymphocyte Rel % 26.6  28.6  28.2    Monocyte Rel % 6.4  4.9  6.5    Eosinophil Rel % 1.3  2.0  1.4    Basophil Rel % 0.4  0.8  0.5       Lab Results   Component Value Date    TSH 1.200 12/20/2024      Lab Results   Component Value Date    FREET4 1.22 08/22/2023      No results found for: \"DDIMERQUANT\"  Magnesium   Date Value Ref Range Status   12/20/2024 1.9 1.6 - 2.6 mg/dL Final      No results found for: \"DIGOXIN\"   Lab Results   Component Value Date    TROPONINT <6 12/20/2024               Holter Monitor  4/16/2024    A relatively benign 3-day extended Holter monitor study.    Underlying rhythm is sinus rhythm with an average heart rate of 92 bpm.  40% of the time was spent in sinus tachycardia.    Very rare isolated PACs and PVCs noted.    There were no arrhythmias.  There were no long pauses.    There were 5 patient triggered events during the study.  One of them correlated with presence of ectopic beats on monitor strips.                Assessment and Plan   Diagnoses and all orders for this visit:    1. Paroxysmal SVT (supraventricular tachycardia) (Primary)  She did not tolerate taking metoprolol as maintenance medication, we discussed in more of a pill in pocket strategy believers for any reoccurrence.  Will go ahead and complete a Holter monitor to evaluate for any further episodes, and will consider EP referral as well for further episodes.  -     Holter Monitor - 72 Hour Up To 15 Days; Future    2. LBBB (left bundle branch block)   No anginal symptoms, but would recommend to schedule previously ordered SPECT stress test to rule out ischemic etiology, as well as echocardiogram to evaluate for any LV dysfunction or valvular issues.    Follow Up   Return for Will schedule after testing completed.    Patient was given instructions and counseling regarding " her condition or for health maintenance advice. Please see specific information pulled into the AVS if appropriate.     Signed,  Ladonna Miranda, APRN  01/15/2025     Dictated Utilizing Dragon Dictation: Please note that portions of this note were completed with a voice recognition program.  Part of this note may be an electronic transcription/translation of spoken language to printed text using the Dragon Dictation System.

## 2025-01-20 ENCOUNTER — PATIENT MESSAGE (OUTPATIENT)
Dept: OBSTETRICS AND GYNECOLOGY | Facility: CLINIC | Age: 40
End: 2025-01-20

## 2025-01-27 ENCOUNTER — TELEPHONE (OUTPATIENT)
Dept: CARDIOLOGY | Facility: CLINIC | Age: 40
End: 2025-01-27
Payer: COMMERCIAL

## 2025-01-27 NOTE — TELEPHONE ENCOUNTER
Patient removed Holter on Friday due to patches causing rash, itching and bleeding when removed.  Wore it for 3 days, holter will end on Wednesday, can turn it in tomorrow. Patient is scheduling Stress and Echo.     Please advise

## 2025-01-27 NOTE — TELEPHONE ENCOUNTER
Caller: Audra Rendon    Relationship: Self    Best call back number: [phone]     What is the best time to reach you: ANY    Who are you requesting to speak with (clinical staff, provider,  specific staff member): ANY    Do you know the name of the person who called:     What was the call regarding: PATIENT CALLING IN TO LET PROVIDER KNOW THAT PATIENT HAS STOPPED WEARING THE HOLTER DUE TO THE PATCHES BREAKING THE PATIENT OUT. PATIENT WAS BLEEDING DUE TO THE PATCHES. PLEASE CALL PATIENT TO ADVISE. THANK YOU!    Is it okay if the provider responds through MyChart: NO

## 2025-01-28 NOTE — TELEPHONE ENCOUNTER
We will review the data that we have available, to hold and will not actually upload data until after the planned time is completed.  If she is still having rash and itching, she may use over-the-counter products such as Benadryl, avoid scratching at the area, keep it clean and dry.

## 2025-02-27 ENCOUNTER — TELEPHONE (OUTPATIENT)
Dept: CARDIOLOGY | Facility: CLINIC | Age: 40
End: 2025-02-27
Payer: COMMERCIAL

## 2025-02-27 NOTE — TELEPHONE ENCOUNTER
Called pt. To report results. Pt. Voiced understanding and will callback with any other questions.

## 2025-02-27 NOTE — TELEPHONE ENCOUNTER
----- Message from Ladonna Miranda sent at 2/22/2025 11:55 AM EST -----  Holter monitor shows she is in normal sinus rhythm, with an average heart rate of 86, but frequently with higher heart rates, but no reoccurrence of SVT.  Echocardiogram and stress test ordered last spring, otherwise unless she has new problems or concerns routine follow-up with Dr. Rodriguez in 6 months.

## 2025-03-18 ENCOUNTER — HOSPITAL ENCOUNTER (OUTPATIENT)
Facility: HOSPITAL | Age: 40
Discharge: HOME | End: 2025-03-18
Admitting: INTERNAL MEDICINE
Payer: COMMERCIAL

## 2025-03-18 DIAGNOSIS — R07.9 CHEST PAIN, UNSPECIFIED TYPE: ICD-10-CM

## 2025-03-18 PROCEDURE — 93306 TTE W/DOPPLER COMPLETE: CPT

## 2025-03-21 LAB
AV VMAX SYS DOP: 121 CM/SEC
BH CV ECHO MEAS - ACS: 1.7 CM
BH CV ECHO MEAS - AO MAX PG: 5.9 MMHG
BH CV ECHO MEAS - AO ROOT DIAM: 2.8 CM
BH CV ECHO MEAS - EDV(CUBED): 59.3 ML
BH CV ECHO MEAS - EDV(MOD-SP2): 70.5 ML
BH CV ECHO MEAS - EDV(MOD-SP4): 70.8 ML
BH CV ECHO MEAS - EF(MOD-SP2): 65.8 %
BH CV ECHO MEAS - EF(MOD-SP4): 66.4 %
BH CV ECHO MEAS - ESV(CUBED): 15.6 ML
BH CV ECHO MEAS - ESV(MOD-SP2): 24.1 ML
BH CV ECHO MEAS - ESV(MOD-SP4): 23.8 ML
BH CV ECHO MEAS - FS: 35.9 %
BH CV ECHO MEAS - IVS/LVPW: 0.89 CM
BH CV ECHO MEAS - IVSD: 0.8 CM
BH CV ECHO MEAS - LA DIMENSION: 2.7 CM
BH CV ECHO MEAS - LAT PEAK E' VEL: 21 CM/SEC
BH CV ECHO MEAS - LV MASS(C)D: 97.4 GRAMS
BH CV ECHO MEAS - LV MAX PG: 3.7 MMHG
BH CV ECHO MEAS - LV V1 MAX: 95.7 CM/SEC
BH CV ECHO MEAS - LVIDD: 3.9 CM
BH CV ECHO MEAS - LVIDS: 2.5 CM
BH CV ECHO MEAS - LVOT AREA: 3.1 CM2
BH CV ECHO MEAS - LVOT DIAM: 2 CM
BH CV ECHO MEAS - LVPWD: 0.9 CM
BH CV ECHO MEAS - MED PEAK E' VEL: 14.9 CM/SEC
BH CV ECHO MEAS - MR MAX PG: 97.6 MMHG
BH CV ECHO MEAS - MR MAX VEL: 494 CM/SEC
BH CV ECHO MEAS - MV A MAX VEL: 34.6 CM/SEC
BH CV ECHO MEAS - MV DEC TIME: 0.17 SEC
BH CV ECHO MEAS - MV E MAX VEL: 89.9 CM/SEC
BH CV ECHO MEAS - MV E/A: 2.6
BH CV ECHO MEAS - MV MEAN PG: 2 MMHG
BH CV ECHO MEAS - MV V2 VTI: 31.5 CM
BH CV ECHO MEAS - RAP SYSTOLE: 5 MMHG
BH CV ECHO MEAS - RVDD: 2.6 CM
BH CV ECHO MEAS - RVSP: 29.6 MMHG
BH CV ECHO MEAS - SV(MOD-SP2): 46.4 ML
BH CV ECHO MEAS - SV(MOD-SP4): 47 ML
BH CV ECHO MEAS - TAPSE (>1.6): 2.09 CM
BH CV ECHO MEAS - TR MAX PG: 24.6 MMHG
BH CV ECHO MEAS - TR MAX VEL: 248 CM/SEC
BH CV ECHO MEASUREMENTS AVERAGE E/E' RATIO: 5.01
BH CV XLRA - TDI S': 14.3 CM/SEC
IVRT: 69 MS
LEFT ATRIUM VOLUME INDEX: 19.2 ML/M2

## 2025-04-29 ENCOUNTER — PRIOR AUTHORIZATION (OUTPATIENT)
Dept: INTERNAL MEDICINE | Facility: CLINIC | Age: 40
End: 2025-04-29

## 2025-04-29 ENCOUNTER — OFFICE VISIT (OUTPATIENT)
Dept: INTERNAL MEDICINE | Facility: CLINIC | Age: 40
End: 2025-04-29
Payer: COMMERCIAL

## 2025-04-29 VITALS
OXYGEN SATURATION: 97 % | BODY MASS INDEX: 24.99 KG/M2 | HEIGHT: 65 IN | HEART RATE: 73 BPM | DIASTOLIC BLOOD PRESSURE: 64 MMHG | SYSTOLIC BLOOD PRESSURE: 108 MMHG | TEMPERATURE: 97.5 F | RESPIRATION RATE: 18 BRPM | WEIGHT: 150 LBS

## 2025-04-29 DIAGNOSIS — Z13.220 SCREENING, LIPID: ICD-10-CM

## 2025-04-29 DIAGNOSIS — R51.9 NONINTRACTABLE HEADACHE, UNSPECIFIED CHRONICITY PATTERN, UNSPECIFIED HEADACHE TYPE: ICD-10-CM

## 2025-04-29 DIAGNOSIS — Z86.2 HISTORY OF ANEMIA: ICD-10-CM

## 2025-04-29 DIAGNOSIS — R53.83 FATIGUE, UNSPECIFIED TYPE: Primary | ICD-10-CM

## 2025-04-29 DIAGNOSIS — E55.9 VITAMIN D DEFICIENCY: ICD-10-CM

## 2025-04-29 DIAGNOSIS — G47.00 INSOMNIA, UNSPECIFIED TYPE: ICD-10-CM

## 2025-04-29 DIAGNOSIS — T78.1XXA REACTION TO FOOD, INITIAL ENCOUNTER: ICD-10-CM

## 2025-04-29 LAB
ALBUMIN SERPL-MCNC: 4.5 G/DL (ref 3.5–5.2)
ALBUMIN/GLOB SERPL: 1.7 G/DL
ALP SERPL-CCNC: 69 U/L (ref 39–117)
ALT SERPL W P-5'-P-CCNC: 13 U/L (ref 1–33)
ANION GAP SERPL CALCULATED.3IONS-SCNC: 11.8 MMOL/L (ref 5–15)
AST SERPL-CCNC: 23 U/L (ref 1–32)
BASOPHILS # BLD AUTO: 0.03 10*3/MM3 (ref 0–0.2)
BASOPHILS NFR BLD AUTO: 0.5 % (ref 0–1.5)
BILIRUB SERPL-MCNC: 0.9 MG/DL (ref 0–1.2)
BUN SERPL-MCNC: 9 MG/DL (ref 6–20)
BUN/CREAT SERPL: 9.9 (ref 7–25)
CALCIUM SPEC-SCNC: 9.6 MG/DL (ref 8.6–10.5)
CHLORIDE SERPL-SCNC: 102 MMOL/L (ref 98–107)
CHOLEST SERPL-MCNC: 167 MG/DL (ref 0–200)
CO2 SERPL-SCNC: 24.2 MMOL/L (ref 22–29)
CREAT SERPL-MCNC: 0.91 MG/DL (ref 0.57–1)
DEPRECATED RDW RBC AUTO: 39.5 FL (ref 37–54)
EGFRCR SERPLBLD CKD-EPI 2021: 82 ML/MIN/1.73
EOSINOPHIL # BLD AUTO: 0.09 10*3/MM3 (ref 0–0.4)
EOSINOPHIL NFR BLD AUTO: 1.6 % (ref 0.3–6.2)
ERYTHROCYTE [DISTWIDTH] IN BLOOD BY AUTOMATED COUNT: 12.2 % (ref 12.3–15.4)
GLOBULIN UR ELPH-MCNC: 2.7 GM/DL
GLUCOSE SERPL-MCNC: 71 MG/DL (ref 65–99)
HCT VFR BLD AUTO: 42.1 % (ref 34–46.6)
HDLC SERPL-MCNC: 57 MG/DL (ref 40–60)
HGB BLD-MCNC: 14 G/DL (ref 12–15.9)
IMM GRANULOCYTES # BLD AUTO: 0.03 10*3/MM3 (ref 0–0.05)
IMM GRANULOCYTES NFR BLD AUTO: 0.5 % (ref 0–0.5)
IRON 24H UR-MRATE: 145 MCG/DL (ref 37–145)
IRON SATN MFR SERPL: 37 % (ref 20–50)
LDLC SERPL CALC-MCNC: 101 MG/DL (ref 0–100)
LDLC/HDLC SERPL: 1.79 {RATIO}
LYMPHOCYTES # BLD AUTO: 1.39 10*3/MM3 (ref 0.7–3.1)
LYMPHOCYTES NFR BLD AUTO: 24.3 % (ref 19.6–45.3)
MCH RBC QN AUTO: 29.2 PG (ref 26.6–33)
MCHC RBC AUTO-ENTMCNC: 33.3 G/DL (ref 31.5–35.7)
MCV RBC AUTO: 87.7 FL (ref 79–97)
MONOCYTES # BLD AUTO: 0.31 10*3/MM3 (ref 0.1–0.9)
MONOCYTES NFR BLD AUTO: 5.4 % (ref 5–12)
NEUTROPHILS NFR BLD AUTO: 3.88 10*3/MM3 (ref 1.7–7)
NEUTROPHILS NFR BLD AUTO: 67.7 % (ref 42.7–76)
NRBC BLD AUTO-RTO: 0 /100 WBC (ref 0–0.2)
PLATELET # BLD AUTO: 299 10*3/MM3 (ref 140–450)
PMV BLD AUTO: 10.6 FL (ref 6–12)
POTASSIUM SERPL-SCNC: 4.5 MMOL/L (ref 3.5–5.2)
PROT SERPL-MCNC: 7.2 G/DL (ref 6–8.5)
RBC # BLD AUTO: 4.8 10*6/MM3 (ref 3.77–5.28)
SODIUM SERPL-SCNC: 138 MMOL/L (ref 136–145)
TIBC SERPL-MCNC: 395 MCG/DL (ref 298–536)
TRANSFERRIN SERPL-MCNC: 265 MG/DL (ref 200–360)
TRIGL SERPL-MCNC: 40 MG/DL (ref 0–150)
TSH SERPL DL<=0.05 MIU/L-ACNC: 0.93 UIU/ML (ref 0.27–4.2)
VLDLC SERPL-MCNC: 9 MG/DL (ref 5–40)
WBC NRBC COR # BLD AUTO: 5.73 10*3/MM3 (ref 3.4–10.8)

## 2025-04-29 PROCEDURE — 83540 ASSAY OF IRON: CPT | Performed by: NURSE PRACTITIONER

## 2025-04-29 PROCEDURE — 82607 VITAMIN B-12: CPT | Performed by: NURSE PRACTITIONER

## 2025-04-29 PROCEDURE — 80061 LIPID PANEL: CPT | Performed by: NURSE PRACTITIONER

## 2025-04-29 PROCEDURE — 82306 VITAMIN D 25 HYDROXY: CPT | Performed by: NURSE PRACTITIONER

## 2025-04-29 PROCEDURE — 85025 COMPLETE CBC W/AUTO DIFF WBC: CPT | Performed by: NURSE PRACTITIONER

## 2025-04-29 PROCEDURE — 80053 COMPREHEN METABOLIC PANEL: CPT | Performed by: NURSE PRACTITIONER

## 2025-04-29 PROCEDURE — 84443 ASSAY THYROID STIM HORMONE: CPT | Performed by: NURSE PRACTITIONER

## 2025-04-29 PROCEDURE — 82746 ASSAY OF FOLIC ACID SERUM: CPT | Performed by: NURSE PRACTITIONER

## 2025-04-29 PROCEDURE — 84466 ASSAY OF TRANSFERRIN: CPT | Performed by: NURSE PRACTITIONER

## 2025-04-29 RX ORDER — MAGNESIUM OXIDE 400 MG/1
400 TABLET ORAL DAILY
Qty: 90 TABLET | Refills: 0 | Status: SHIPPED | OUTPATIENT
Start: 2025-04-29

## 2025-04-29 RX ORDER — VITAMIN B COMPLEX
1 CAPSULE ORAL DAILY
COMMUNITY

## 2025-04-29 RX ORDER — BUTALBITAL, ACETAMINOPHEN AND CAFFEINE 50; 325; 40 MG/1; MG/1; MG/1
1 TABLET ORAL DAILY PRN
Qty: 30 TABLET | Refills: 0 | Status: CANCELLED | OUTPATIENT
Start: 2025-04-29

## 2025-04-29 RX ORDER — ALBUTEROL SULFATE 90 UG/1
2 INHALANT RESPIRATORY (INHALATION) EVERY 4 HOURS PRN
Qty: 8 G | Refills: 1 | Status: SHIPPED | OUTPATIENT
Start: 2025-04-29

## 2025-04-29 RX ORDER — LISDEXAMFETAMINE DIMESYLATE 40 MG/1
40 CAPSULE ORAL EVERY MORNING
COMMUNITY
Start: 2025-04-03

## 2025-04-29 NOTE — TELEPHONE ENCOUNTER
Nurtec 75MG dispersible tablets    This request has not been approved. Based on the information we received, you did not meet the specific rule(s) needed to approve this request. In some cases, the requested drug or alternatives offered may have other guideline rules that need to be met. Our guideline named ANTI-MIGRAINE CGRP INHIBITORS requires the following rule(s) be met for approval:A. For Acute Treatment of Migraine:1. The member had a month trial and failure [drug did not work], allergy, contraindication [harmful for], or intolerance [side effects] to at least two triptans (such as almotriptan, eletriptan, frovatriptan, naratriptan, rizatriptan, sumatriptan, zolmitriptan).This is why your request is denied. Please work with your doctor to use a different medication or get us more information if it will allow us to approve this request. A written notification letter will follow with additional details.

## 2025-04-29 NOTE — PROGRESS NOTES
Chief Complaint  Fatigue (Fatigue since December. Patient has been waking up through out the night which could contribute to fatigue.), Weight Gain (Weight gain since December), and Headache (Patient stated that she has been having a headache every day. Patient has had migraine medication in the past. )      Subjective      History of Present Illness  The patient is a 47-year-old female presenting with fatigue, sleep disturbances, weight gain, and daily cephalalgia.    She reports frequent nocturnal awakenings without an apparent cause and recent nightmares, which may suggest a hormonal imbalance. She denies any history of snoring.    Her primary concern is daily headaches, which vary in intensity and are sometimes severe enough to disrupt sleep. The pain is described as global. She manages the headaches with acetaminophen and ibuprofen but has run out of Fioricet. She uses a humidifier for nasal dryness and experienced a significant epistaxis in December 2024, which the humidifier did not alleviate. She has not tried Nurtec. Previous failure on Imitrex and Maxalt. She experiences ocular migraines that cause temporary vision loss, which is relieved by Fioricet. She has abstained from consuming Coca-Cola, as it exacerbated her symptoms. She reports no current stressors and maintains a positive outlook.    The patient attributes her weight gain to poor sleep and headaches. She has a family history of thyroid disorders and has experienced fluctuating thyroid levels, including postpartum hyperthyroidism 15 years ago and hypothyroidism during pregnancy. She suspects Graves' disease due to rapid weight loss. Despite recent life changes, she does not believe overeating is a contributing factor. She maintains an active lifestyle and plans to incorporate regular exercise.    She suspects an allergy to kiwi, experiencing unusual sensations in her tongue after consumption. She has no known food allergies but is allergic to  "morphine.  She denies progressive worsening of symptoms with eating kiwi.  She denies any airway involvement.    FAMILY HISTORY  Her mother has Hashimoto's disease.         Objective   Vital Signs:   Vitals:    04/29/25 1129   BP: 108/64   BP Location: Left arm   Patient Position: Sitting   Cuff Size: Adult   Pulse: 73   Resp: 18   Temp: 97.5 °F (36.4 °C)   TempSrc: Temporal   SpO2: 97%   Weight: 68 kg (150 lb)   Height: 165.1 cm (65\")     Body mass index is 24.96 kg/m².    Wt Readings from Last 3 Encounters:   04/29/25 68 kg (150 lb)   01/15/25 64.4 kg (142 lb)   12/20/24 72.8 kg (160 lb 7.9 oz)     BP Readings from Last 3 Encounters:   04/29/25 108/64   01/15/25 108/71   12/20/24 119/87       Health Maintenance   Topic Date Due    Pneumococcal Vaccine 0-49 (1 of 2 - PCV) Never done    MAMMOGRAM  Never done    ANNUAL PHYSICAL  08/22/2024    COVID-19 Vaccine (1 - 2024-25 season) Never done    INFLUENZA VACCINE  07/01/2025    TDAP/TD VACCINES (3 - Td or Tdap) 10/30/2030    HEPATITIS C SCREENING  Completed       Physical Exam  Vitals and nursing note reviewed.   Constitutional:       General: She is not in acute distress.     Appearance: Normal appearance.   HENT:      Head: Normocephalic and atraumatic.      Right Ear: External ear normal.      Left Ear: External ear normal.      Nose: Nose normal.      Mouth/Throat:      Mouth: Mucous membranes are moist.   Eyes:      Conjunctiva/sclera: Conjunctivae normal.   Cardiovascular:      Rate and Rhythm: Normal rate and regular rhythm.      Pulses: Normal pulses.      Heart sounds: Normal heart sounds. No murmur heard.     No friction rub. No gallop.   Pulmonary:      Effort: Pulmonary effort is normal. No respiratory distress.      Breath sounds: No wheezing, rhonchi or rales.   Musculoskeletal:      Cervical back: Neck supple.      Right lower leg: No edema.      Left lower leg: No edema.   Skin:     General: Skin is warm and dry.   Neurological:      General: No focal " deficit present.      Mental Status: She is alert and oriented to person, place, and time.   Psychiatric:         Mood and Affect: Mood normal.         Behavior: Behavior normal.        Physical Exam        Result Review :  The following data was reviewed by: MINDA Luu on 04/29/2025:         Results      BMI is within normal parameters. No other follow-up for BMI required.       Procedures            Assessment & Plan  Fatigue, unspecified type    Orders:    Comprehensive Metabolic Panel    CBC & Differential    TSH    Vitamin D,25-Hydroxy    Vitamin B12    Iron Profile    Folate    Insomnia, unspecified type         Nonintractable headache, unspecified chronicity pattern, unspecified headache type                   History of anemia    Orders:    CBC & Differential    Vitamin B12    Iron Profile    Folate    Vitamin D deficiency    Orders:    Vitamin D,25-Hydroxy    Screening, lipid    Orders:    Lipid Panel    Reaction to food, initial encounter              Assessment & Plan  1. Headaches  - Multifactorial etiology, possibly rebound effects from Tylenol and ibuprofen, or migraines. Poor sleep quality may contribute.  - Trial of Nurtec pending insurance approval  - Alternative treatments if no improvement in 6 weeks.    2. Weight gain  - Counseled on muscle tone exercises for weight management.  - Ordered labs for thyroid function.    3. Suspected kiwi allergy  - Informed that certain foods can cause unusual lip and tongue sensations, not necessarily allergies.  - Advised caution when consuming kiwi, ensure not alone.  - Discussed further evaluation with allergy but unknown if specific testing will be done for acuity    4. Sleep disturbances  - Advised to avoid screen time 2 hours before bedtime.    5.  Fatigue  - Prescribed magnesium oxide daily.  - Ordered labs for kidney and liver function, blood counts, thyroid function, cholesterol, B12, vitamin D, folate, and iron.    Patient or patient  representative verbalized consent for the use of Ambient Listening during the visit with  MINDA Luu for chart documentation. 4/29/2025  16:16 EDT      FOLLOW UP  Return in about 6 weeks (around 6/10/2025).  Patient was given instructions and counseling regarding her condition or for health maintenance advice. Please see specific information pulled into the AVS if appropriate.     MINDA Luu  04/29/25  16:16 EDT    CURRENT & DISCONTINUED MEDICATIONS  Current Outpatient Medications   Medication Instructions    acyclovir (ZOVIRAX) 400 mg, Oral, 3 Times Daily, Take no more than 5 doses a day.    albuterol sulfate  (90 Base) MCG/ACT inhaler 2 puffs, Inhalation, Every 4 Hours PRN    Ascorbic Acid (VITAMIN C GUMMIES PO) 2 each, Daily    B Complex Vitamins (vitamin b complex) capsule capsule 1 capsule, Daily    busPIRone (BUSPAR) 7.5 mg, As Needed    butalbital-acetaminophen-caffeine (Fioricet) -40 MG per tablet 1 tablet, Oral, Daily PRN, May repeat in 2 hrs if headache not improved. Max 2 in 24 hr period    magnesium oxide (MAG-OX) 400 mg, Oral, Daily    rimegepant sulfate ODT (NURTEC) 75 mg, Sublingual, Every 48 Hours PRN    Vitamin D-Vitamin K (VITAMIN K2-VITAMIN D3 PO) 1 each, Daily    Vyvanse 40 mg, Every Morning       Medications Discontinued During This Encounter   Medication Reason    lamoTRIgine (LaMICtal) 100 MG tablet *Therapy completed    vitamin E 400 UNIT capsule *Therapy completed    magnesium oxide (MAG-OX) 400 MG tablet *Therapy completed    metoprolol succinate XL (TOPROL-XL) 25 MG 24 hr tablet *Therapy completed    lisdexamfetamine (VYVANSE) 60 MG capsule     albuterol sulfate  (90 Base) MCG/ACT inhaler Reorder

## 2025-04-29 NOTE — ASSESSMENT & PLAN NOTE
Orders:    Comprehensive Metabolic Panel    CBC & Differential    TSH    Vitamin D,25-Hydroxy    Vitamin B12    Iron Profile    Folate

## 2025-04-30 ENCOUNTER — RESULTS FOLLOW-UP (OUTPATIENT)
Dept: INTERNAL MEDICINE | Facility: CLINIC | Age: 40
End: 2025-04-30
Payer: COMMERCIAL

## 2025-04-30 LAB
25(OH)D3 SERPL-MCNC: 31.9 NG/ML (ref 30–100)
FOLATE SERPL-MCNC: 14.4 NG/ML (ref 4.78–24.2)
VIT B12 BLD-MCNC: 413 PG/ML (ref 211–946)

## 2025-04-30 RX ORDER — RIZATRIPTAN BENZOATE 10 MG/1
10 TABLET ORAL AS NEEDED
Qty: 12 TABLET | Refills: 1 | Status: SHIPPED | OUTPATIENT
Start: 2025-04-30

## 2025-04-30 NOTE — TELEPHONE ENCOUNTER
Spoke with patient and she stated she had only tried imitrex and fioricet in the past, please advise

## 2025-05-01 DIAGNOSIS — R53.83 FATIGUE, UNSPECIFIED TYPE: Primary | ICD-10-CM

## 2025-05-01 NOTE — TELEPHONE ENCOUNTER
"Call pt lv to call us back    Relay     \"Per Lori Norman: Please let her know I have sent in Maxalt for her to try. If she has any issues such as palpitations on the medication please let us know. \"                  "

## 2025-05-01 NOTE — TELEPHONE ENCOUNTER
Called and spoke with pt, explained to pt provider results, pt did state she is taking vit c supplement. Pt is wondering if PCP would be willing to check pt's Feratin level

## 2025-06-09 ENCOUNTER — APPOINTMENT (OUTPATIENT)
Dept: GENERAL RADIOLOGY | Facility: HOSPITAL | Age: 40
End: 2025-06-09

## 2025-06-09 ENCOUNTER — HOSPITAL ENCOUNTER (EMERGENCY)
Facility: HOSPITAL | Age: 40
Discharge: HOME | End: 2025-06-09
Attending: EMERGENCY MEDICINE | Admitting: EMERGENCY MEDICINE

## 2025-06-09 VITALS
RESPIRATION RATE: 14 BRPM | TEMPERATURE: 98.5 F | WEIGHT: 146.83 LBS | OXYGEN SATURATION: 100 % | BODY MASS INDEX: 24.46 KG/M2 | HEIGHT: 65 IN | SYSTOLIC BLOOD PRESSURE: 110 MMHG | DIASTOLIC BLOOD PRESSURE: 74 MMHG | HEART RATE: 74 BPM

## 2025-06-09 DIAGNOSIS — T78.40XA ALLERGIC REACTION, INITIAL ENCOUNTER: Primary | ICD-10-CM

## 2025-06-09 LAB
ALBUMIN SERPL-MCNC: 4.6 G/DL (ref 3.5–5.2)
ALBUMIN/GLOB SERPL: 1.7 G/DL
ALP SERPL-CCNC: 60 U/L (ref 39–117)
ALT SERPL W P-5'-P-CCNC: 10 U/L (ref 1–33)
ANION GAP SERPL CALCULATED.3IONS-SCNC: 10.5 MMOL/L (ref 5–15)
AST SERPL-CCNC: 14 U/L (ref 1–32)
BASOPHILS # BLD AUTO: 0.05 10*3/MM3 (ref 0–0.2)
BASOPHILS NFR BLD AUTO: 0.4 % (ref 0–1.5)
BILIRUB SERPL-MCNC: 1.2 MG/DL (ref 0–1.2)
BUN SERPL-MCNC: 11.1 MG/DL (ref 6–20)
BUN/CREAT SERPL: 13.2 (ref 7–25)
CALCIUM SPEC-SCNC: 9 MG/DL (ref 8.6–10.5)
CHLORIDE SERPL-SCNC: 104 MMOL/L (ref 98–107)
CO2 SERPL-SCNC: 23.5 MMOL/L (ref 22–29)
CREAT SERPL-MCNC: 0.84 MG/DL (ref 0.57–1)
DEPRECATED RDW RBC AUTO: 39.9 FL (ref 37–54)
EGFRCR SERPLBLD CKD-EPI 2021: 89.7 ML/MIN/1.73
EOSINOPHIL # BLD AUTO: 0.04 10*3/MM3 (ref 0–0.4)
EOSINOPHIL NFR BLD AUTO: 0.3 % (ref 0.3–6.2)
ERYTHROCYTE [DISTWIDTH] IN BLOOD BY AUTOMATED COUNT: 12.3 % (ref 12.3–15.4)
GLOBULIN UR ELPH-MCNC: 2.7 GM/DL
GLUCOSE SERPL-MCNC: 90 MG/DL (ref 65–99)
HCT VFR BLD AUTO: 43.4 % (ref 34–46.6)
HGB BLD-MCNC: 14.2 G/DL (ref 12–15.9)
HOLD SPECIMEN: NORMAL
HOLD SPECIMEN: NORMAL
IMM GRANULOCYTES # BLD AUTO: 0.06 10*3/MM3 (ref 0–0.05)
IMM GRANULOCYTES NFR BLD AUTO: 0.5 % (ref 0–0.5)
LIPASE SERPL-CCNC: 25 U/L (ref 13–60)
LYMPHOCYTES # BLD AUTO: 1.16 10*3/MM3 (ref 0.7–3.1)
LYMPHOCYTES NFR BLD AUTO: 9.9 % (ref 19.6–45.3)
MAGNESIUM SERPL-MCNC: 2.1 MG/DL (ref 1.6–2.6)
MCH RBC QN AUTO: 28.7 PG (ref 26.6–33)
MCHC RBC AUTO-ENTMCNC: 32.7 G/DL (ref 31.5–35.7)
MCV RBC AUTO: 87.7 FL (ref 79–97)
MONOCYTES # BLD AUTO: 0.83 10*3/MM3 (ref 0.1–0.9)
MONOCYTES NFR BLD AUTO: 7.1 % (ref 5–12)
NEUTROPHILS NFR BLD AUTO: 81.8 % (ref 42.7–76)
NEUTROPHILS NFR BLD AUTO: 9.54 10*3/MM3 (ref 1.7–7)
NRBC BLD AUTO-RTO: 0 /100 WBC (ref 0–0.2)
NT-PROBNP SERPL-MCNC: 73 PG/ML (ref 0–450)
PLATELET # BLD AUTO: 263 10*3/MM3 (ref 140–450)
PMV BLD AUTO: 9.6 FL (ref 6–12)
POTASSIUM SERPL-SCNC: 4.1 MMOL/L (ref 3.5–5.2)
PROT SERPL-MCNC: 7.3 G/DL (ref 6–8.5)
QT INTERVAL: 427 MS
QTC INTERVAL: 495 MS
RBC # BLD AUTO: 4.95 10*6/MM3 (ref 3.77–5.28)
SODIUM SERPL-SCNC: 138 MMOL/L (ref 136–145)
TROPONIN T SERPL HS-MCNC: <6 NG/L
WBC NRBC COR # BLD AUTO: 11.68 10*3/MM3 (ref 3.4–10.8)
WHOLE BLOOD HOLD COAG: NORMAL
WHOLE BLOOD HOLD SPECIMEN: NORMAL

## 2025-06-09 PROCEDURE — 83690 ASSAY OF LIPASE: CPT | Performed by: EMERGENCY MEDICINE

## 2025-06-09 PROCEDURE — 99284 EMERGENCY DEPT VISIT MOD MDM: CPT

## 2025-06-09 PROCEDURE — 80053 COMPREHEN METABOLIC PANEL: CPT | Performed by: EMERGENCY MEDICINE

## 2025-06-09 PROCEDURE — 83880 ASSAY OF NATRIURETIC PEPTIDE: CPT | Performed by: EMERGENCY MEDICINE

## 2025-06-09 PROCEDURE — 84484 ASSAY OF TROPONIN QUANT: CPT | Performed by: EMERGENCY MEDICINE

## 2025-06-09 PROCEDURE — 93005 ELECTROCARDIOGRAM TRACING: CPT | Performed by: EMERGENCY MEDICINE

## 2025-06-09 PROCEDURE — 85025 COMPLETE CBC W/AUTO DIFF WBC: CPT | Performed by: EMERGENCY MEDICINE

## 2025-06-09 PROCEDURE — 71045 X-RAY EXAM CHEST 1 VIEW: CPT

## 2025-06-09 PROCEDURE — 83735 ASSAY OF MAGNESIUM: CPT | Performed by: EMERGENCY MEDICINE

## 2025-06-09 RX ORDER — ASPIRIN 81 MG/1
324 TABLET, CHEWABLE ORAL ONCE
Status: DISCONTINUED | OUTPATIENT
Start: 2025-06-09 | End: 2025-06-09 | Stop reason: HOSPADM

## 2025-06-09 RX ORDER — SODIUM CHLORIDE 0.9 % (FLUSH) 0.9 %
10 SYRINGE (ML) INJECTION AS NEEDED
Status: DISCONTINUED | OUTPATIENT
Start: 2025-06-09 | End: 2025-06-09 | Stop reason: HOSPADM

## 2025-06-09 NOTE — ED PROVIDER NOTES
Time: 11:44 AM EDT  Date of encounter:  6/9/2025  Independent Historian/Clinical History and Information was obtained by:   Patient    History is limited by: N/A    Chief Complaint: allergic rx       History of Present Illness:  Patient is a 40 y.o. year old female who presents to the emergency department for evaluation of facial flushing and chest tightness after taking NAD+ for first time today.  She states the chest tightness has resolved and facial flushing has resolved.  Patient denies shortness of breath.  Patient denies dysphagia.      Patient Care Team  Primary Care Provider: Lori Norman APRN    Past Medical History:     Allergies   Allergen Reactions    Kiwi Other (See Comments)     Tongue numbness/tingling  Throat felt thick    Milk-Related Compounds GI Intolerance     GI issue-intolerance    Morphine Unknown - High Severity     Past Medical History:   Diagnosis Date    Abnormal Pap smear of cervix     Allergies     Anemia     Anxiety and depression     Asthma     Bipolar 1 disorder     Cystic acne     Foot pain, bilateral     Gall stones     Head injury     Hearing loss     Intrinsic sphincter deficiency (ISD)     Iron deficiency     Kidney calculi     Migraine     Migraine headache     Ovarian cyst     Stress incontinence     Thyroid disorder     Thyroid dysfunction, postpartum (-XK- AGAIN) (3-2014-TO )    RESOLVED    Tinnitus     Urinary tract infection      Past Surgical History:   Procedure Laterality Date    D & C HYSTEROSCOPY ENDOMETRIAL ABLATION N/A 06/15/2017    Procedure: DILATATION AND CURETTAGE HYSTEROSCOPY NOVASURE ENDOMETRIAL ABLATION;  Surgeon: Hebert Wallace MD;  Location: [facility];  Service:     DILATATION AND CURETTAGE  2008    LAPAROSCOPIC CHOLECYSTECTOMY  02/2007    LASER ABLATION      TOOTH EXTRACTION Right 04/03/2024    K9 tooth    TRANSVAGINAL TAPING SUSPENSION N/A 06/15/2017    Procedure: RETROPUBIC SLING W/CYSTOSCOPY ;  Surgeon: Hebert Wallace MD;   Location: [facility] MAIN OR;  Service:     TUBAL ABDOMINAL LIGATION      VAGINAL HYSTERECTOMY  2019    WISDOM TOOTH EXTRACTION       Family History   Problem Relation Age of Onset    Colon polyps Mother     Heart disease Paternal Grandfather     Leukemia Maternal Grandmother     Prostate cancer Maternal Grandfather     Colon cancer Maternal Aunt 55    Cervical cancer Paternal Aunt     Malig Hyperthermia Neg Hx        Home Medications:  Prior to Admission medications    Medication Sig Start Date End Date Taking? Authorizing Provider   albuterol sulfate  (90 Base) MCG/ACT inhaler Inhale 2 puffs Every 4 (Four) Hours As Needed for Wheezing. 4/29/25  Yes Lori Norman APRN   B Complex Vitamins (vitamin b complex) capsule capsule Take 1 capsule by mouth Daily.   Yes ProviderToño MD   busPIRone (BUSPAR) 15 MG tablet Take 0.5 tablets by mouth As Needed. 9/29/21  Yes ProviderToño MD   Vitamin D-Vitamin K (VITAMIN K2-VITAMIN D3 PO) Take 1 each by mouth Daily.   Yes ProviderToño MD   Vyvanse 40 MG capsule Take 1 capsule by mouth Every Morning 4/3/25  Yes ProviderToño MD   acyclovir (ZOVIRAX) 400 MG tablet Take 1 tablet by mouth 3 (Three) Times a Day. Take no more than 5 doses a day. 8/27/24   Lori Norman APRN   Ascorbic Acid (VITAMIN C GUMMIES PO) Take 2 each by mouth Daily.    ProviderToño MD   butalbital-acetaminophen-caffeine (Fioricet) -40 MG per tablet Take 1 tablet by mouth Daily As Needed for Headache. May repeat in 2 hrs if headache not improved. Max 2 in 24 hr period 8/27/24   Lori Norman APRN   magnesium oxide (MAG-OX) 400 MG tablet Take 1 tablet by mouth Daily. 4/29/25   Lori Norman APRN   rimegepant sulfate ODT (Nurtec) 75 MG disintegrating tablet Place 1 tablet under the tongue Every Other Day As Needed (headache). 4/29/25   Lori Norman APRN   rizatriptan (Maxalt) 10 MG tablet Take 1 tablet by mouth As Needed for Migraine. May repeat in 2  "hours if needed 25   Lori Norman, APRN        Social History:   Social History     Tobacco Use    Smoking status: Former     Current packs/day: 0.00     Average packs/day: 1.5 packs/day for 6.0 years (9.0 ttl pk-yrs)     Types: Cigarettes     Start date: 2008     Quit date: 2014     Years since quittin.1    Smokeless tobacco: Never   Vaping Use    Vaping status: Never Used   Substance Use Topics    Alcohol use: Yes    Drug use: Not Currently     Types: Marijuana     Comment: as a teen         Review of Systems:  Review of Systems   Constitutional:  Negative for chills and fever.   HENT:  Negative for congestion, rhinorrhea and sore throat.    Eyes:  Negative for pain and visual disturbance.   Respiratory:  Positive for chest tightness. Negative for apnea, cough and shortness of breath.    Cardiovascular:  Negative for chest pain and palpitations.   Gastrointestinal:  Negative for abdominal pain, diarrhea, nausea and vomiting.   Genitourinary:  Negative for difficulty urinating and dysuria.   Musculoskeletal:  Negative for joint swelling and myalgias.   Skin:  Negative for color change.   Neurological:  Negative for seizures and headaches.   Psychiatric/Behavioral: Negative.     All other systems reviewed and are negative.       Physical Exam:  /76 (BP Location: Right arm, Patient Position: Sitting)   Pulse 83   Temp 99 °F (37.2 °C) (Oral)   Resp 14   Ht 165.1 cm (65\")   Wt 66.6 kg (146 lb 13.2 oz)   LMP 2017   SpO2 100%   BMI 24.43 kg/m²     Physical Exam  Vitals and nursing note reviewed.   Constitutional:       General: She is not in acute distress.     Appearance: Normal appearance. She is not toxic-appearing.   HENT:      Head: Normocephalic and atraumatic.      Jaw: There is normal jaw occlusion.   Eyes:      General: Lids are normal.      Extraocular Movements: Extraocular movements intact.      Conjunctiva/sclera: Conjunctivae normal.      Pupils: Pupils are equal, " round, and reactive to light.   Cardiovascular:      Rate and Rhythm: Normal rate and regular rhythm.      Pulses: Normal pulses.      Heart sounds: Normal heart sounds.   Pulmonary:      Effort: Pulmonary effort is normal. No respiratory distress.      Breath sounds: Normal breath sounds. No wheezing or rhonchi.   Abdominal:      General: Abdomen is flat.      Palpations: Abdomen is soft.      Tenderness: There is no abdominal tenderness. There is no guarding or rebound.   Musculoskeletal:         General: Normal range of motion.      Cervical back: Normal range of motion and neck supple.      Right lower leg: No edema.      Left lower leg: No edema.   Skin:     General: Skin is warm and dry.   Neurological:      Mental Status: She is alert and oriented to person, place, and time. Mental status is at baseline.   Psychiatric:         Mood and Affect: Mood normal.                    Medical Decision Making:      Comorbidities that affect care:    Asthma    External Notes reviewed:          The following orders were placed and all results were independently analyzed by me:  Orders Placed This Encounter   Procedures    XR Chest 1 View    [place] Draw    High Sensitivity Troponin T    Comprehensive Metabolic Panel    Lipase    BNP    Magnesium    CBC Auto Differential    High Sensitivity Troponin T 1Hr    NPO Diet NPO Type: Strict NPO    Undress & Gown    Continuous Pulse Oximetry    Oxygen Therapy- Nasal Cannula; Titrate 1-6 LPM Per SpO2; 90 - 95%    ECG 12 Lead ED Triage Standing Order; Chest Pain    ECG 12 Lead ED Triage Standing Order; Chest Pain    Insert Peripheral IV    CBC & Differential    Green Top (Gel)    Lavender Top    Gold Top - SST    Light Blue Top       Medications Given in the Emergency Department:  Medications   sodium chloride 0.9 % flush 10 mL (has no administration in time range)   aspirin chewable tablet 324 mg (has no administration in time range)        ED Course:         Labs:    Lab Results  (last 24 hours)       Procedure Component Value Units Date/Time    High Sensitivity Troponin T [[id number]]  (Normal) Collected: 06/09/25 1044    Specimen: Blood from Arm, Right Updated: 06/09/25 1116     HS Troponin T <6 ng/L     Narrative:      High Sensitive Troponin T Reference Range:  <14.0 ng/L- Negative Female for AMI  <22.0 ng/L- Negative Male for AMI  >=14 - Abnormal Female indicating possible myocardial injury.  >=22 - Abnormal Male indicating possible myocardial injury.   Clinicians would have to utilize clinical acumen, EKG, Troponin, and serial changes to determine if it is an Acute Myocardial Infarction or myocardial injury due to an underlying chronic condition.         CBC & Differential [[id number]]  (Abnormal) Collected: 06/09/25 1044    Specimen: Blood from Arm, Right Updated: 06/09/25 1055    Narrative:      The following orders were created for panel order CBC & Differential.  Procedure                               Abnormality         Status                     ---------                               -----------         ------                     CBC Auto Differential[[id number]]        Abnormal            Final result                 Please view results for these tests on the individual orders.    Comprehensive Metabolic Panel [[id number]] Collected: 06/09/25 1044    Specimen: Blood from Arm, Right Updated: 06/09/25 1116     Glucose 90 mg/dL      BUN 11.1 mg/dL      Creatinine 0.84 mg/dL      Sodium 138 mmol/L      Potassium 4.1 mmol/L      Chloride 104 mmol/L      CO2 23.5 mmol/L      Calcium 9.0 mg/dL      Total Protein 7.3 g/dL      Albumin 4.6 g/dL      ALT (SGPT) 10 U/L      AST (SGOT) 14 U/L      Alkaline Phosphatase 60 U/L      Total Bilirubin 1.2 mg/dL      Globulin 2.7 gm/dL      A/G Ratio 1.7 g/dL      BUN/Creatinine Ratio 13.2     Anion Gap 10.5 mmol/L      eGFR 89.7 mL/min/1.73     Narrative:      GFR Categories in Chronic Kidney Disease (CKD)              GFR Category          GFR  (mL/min/1.73)    Interpretation  G1                    90 or greater        Normal or high (1)  G2                    60-89                Mild decrease (1)  G3a                   45-59                Mild to moderate decrease  G3b                   30-44                Moderate to severe decrease  G4                    15-29                Severe decrease  G5                    14 or less           Kidney failure    (1)In the absence of evidence of kidney disease, neither GFR category G1 or G2 fulfill the criteria for CKD.    eGFR calculation 2021 CKD-EPI creatinine equation, which does not include race as a factor    Lipase [[id number]]  (Normal) Collected: 06/09/25 1044    Specimen: Blood from Arm, Right Updated: 06/09/25 1116     Lipase 25 U/L     BNP [[id number]]  (Normal) Collected: 06/09/25 1044    Specimen: Blood from Arm, Right Updated: 06/09/25 1114     proBNP 73.0 pg/mL     Narrative:      This assay is used as an aid in the diagnosis of individuals suspected of having heart failure. It can be used as an aid in the diagnosis of acute decompensated heart failure (ADHF) in patients presenting with signs and symptoms of ADHF to the emergency department (ED). In addition, NT-proBNP of <300 pg/mL indicates ADHF is not likely.    Age Range Result Interpretation  NT-proBNP Concentration (pg/mL:      <50             Positive            >450                   Gray                 300-450                    Negative             <300    50-75           Positive            >900                  Gray                300-900                  Negative            <300      >75             Positive            >1800                  Gray                300-1800                  Negative            <300    Magnesium [[id number]]  (Normal) Collected: 06/09/25 1044    Specimen: Blood from Arm, Right Updated: 06/09/25 1116     Magnesium 2.1 mg/dL     CBC Auto Differential [[id number]]  (Abnormal) Collected: 06/09/25 1044     Specimen: Blood from Arm, Right Updated: 06/09/25 1055     WBC 11.68 10*3/mm3      RBC 4.95 10*6/mm3      Hemoglobin 14.2 g/dL      Hematocrit 43.4 %      MCV 87.7 fL      MCH 28.7 pg      MCHC 32.7 g/dL      RDW 12.3 %      RDW-SD 39.9 fl      MPV 9.6 fL      Platelets 263 10*3/mm3      Neutrophil % 81.8 %      Lymphocyte % 9.9 %      Monocyte % 7.1 %      Eosinophil % 0.3 %      Basophil % 0.4 %      Immature Grans % 0.5 %      Neutrophils, Absolute 9.54 10*3/mm3      Lymphocytes, Absolute 1.16 10*3/mm3      Monocytes, Absolute 0.83 10*3/mm3      Eosinophils, Absolute 0.04 10*3/mm3      Basophils, Absolute 0.05 10*3/mm3      Immature Grans, Absolute 0.06 10*3/mm3      nRBC 0.0 /100 WBC              Imaging:    XR Chest 1 View  Result Date: 6/9/2025  XR CHEST 1 VW Date of Exam: 6/9/2025 10:18 AM EDT Indication: Chest Pain Triage Protocol Comparison: Chest radiograph dated 12/20/2024 Findings: The cardiomediastinal silhouette is within normal limits. Pulmonary vascularity appears normal. There is no acute airspace consolidation, pleural effusion, or pneumothorax. There are mild degenerative changes of the thoracic spine.     Impression: No acute cardiopulmonary abnormality. Electronically Signed: Balwinder Mcdonald MD  6/9/2025 10:44 AM EDT  Workstation ID: [id number]        Differential Diagnosis and Discussion:    Allergic Reaction: Differential diagnosis includes but is not limited to drug side effects, contact dermatitis, autoimmune conditions, infections, mast cell disorders, serum sickness, anaphylactoid reactions, angioedema, panic or anxiety attacks.  Chest Pain:  Based on the patient's signs and symptoms, I considered aortic dissection, myocardial infaction, pulmonary embolism, cardiac tamponade, pericarditis, pneumothorax, musculoskeletal chest pain and other differential diagnosis as an etiology of the patient's chest pain.     PROCEDURES:    Labs were collected in the emergency department and all labs were  reviewed and interpreted by me.  X-ray were performed in the emergency department and all X-ray impressions were independently interpreted by me.  An EKG was performed and the EKG was interpreted by supervising attending.    ECG 12 Lead ED Triage Standing Order; Chest Pain   Preliminary Result   HEART RATE=81  bpm   RR Afuzsjeo=021  ms   KS Gyeebber=205  ms   P Horizontal Axis=-14  deg   P Front Axis=82  deg   QRSD Akyvvooe=825  ms   QT Cdahhtou=967  ms   KViJ=147  ms   QRS Axis=-7  deg   T Wave Axis=84  deg   - ABNORMAL ECG -   Sinus rhythm   Left bundle branch block   Date and Time of Study:2025-06-09 10:02:10          Procedures    MDM     Amount and/or Complexity of Data Reviewed  Clinical lab tests: reviewed  Tests in the radiology section of CPT®: reviewed  Tests in the medicine section of CPT®: reviewed       Troponin within normal limits. the patient´s CMP that was reviewed and interpretted by me shows no abnormalities of critical concern. Of note, the patient´s sodium and potassium are acceptable. The patient´s liver enzymes are unremarkable. The patient´s renal function (creatinine) is preserved. The patient has a normal anion gap.  Oxygen saturation 100% on room air.  Chest x-ray shows no acute abnormality.  There is no flushing at this time.  I instructed patient to not take NAD+ anymore at this time as it appears she had an allergic reaction.  Patient states she understands and agrees with plan of care.                Patient Care Considerations:          Consultants/Shared Management Plan:    None    Social Determinants of Health:    Patient is independent, reliable, and has access to care.       Disposition and Care Coordination:    Discharged: The patient is suitable and stable for discharge with no need for consideration of admission.    I have explained the patient´s condition, diagnoses and treatment plan based on the information available to me at this time. I have answered questions and  addressed any concerns. The patient has a good  understanding of the patient´s diagnosis, condition, and treatment plan as can be expected at this point. The vital signs have been stable. The patient´s condition is stable and appropriate for discharge from the emergency department.      The patient will pursue further outpatient evaluation with the primary care physician or other designated or consulting physician as outlined in the discharge instructions. They are agreeable to this plan of care and follow-up instructions have been explained in detail. The patient has received these instructions in written format and has expressed an understanding of the discharge instructions. The patient is aware that any significant change in condition or worsening of symptoms should prompt an immediate return to this or the closest emergency department or call to 911.  I have explained discharge medications and the need for follow up with the patient/caretakers. This was also printed in the discharge instructions. Patient was discharged with the following medications and follow up:      Medication List      No changes were made to your prescriptions during this visit.      Lori Norman, APRN  [address] [id number] [phone]    Call in 1 day  To schedule follow-up       Final diagnoses:   Allergic reaction, initial encounter        ED Disposition       ED Disposition   Discharge    Condition   Stable    Comment   --               This medical record created using voice recognition software.             Bhavik Johnson PA-C  06/09/25 1145

## 2025-06-09 NOTE — ED PROVIDER NOTES
"SHARED VISIT ATTESTATION:    This visit was performed by myself and an APC.  I personally approved the management plan/medical decision making and take responsibility for the patient management.      SHARED VISIT NOTE:    Patient is 40 y.o. year old female that presents to the ED for evaluation of allergic reaction or flushing after using supplement.     Physical Exam    ED Course:    /76 (BP Location: Right arm, Patient Position: Sitting)   Pulse 83   Temp 99 °F (37.2 °C) (Oral)   Resp 14   Ht 165.1 cm (65\")   Wt 66.6 kg (146 lb 13.2 oz)   LMP 05/24/2017   SpO2 100%   BMI 24.43 kg/m²       The following orders were placed and all results were independently analyzed by me:  Orders Placed This Encounter   Procedures    XR Chest 1 View    Springs Draw    High Sensitivity Troponin T    Comprehensive Metabolic Panel    Lipase    BNP    Magnesium    CBC Auto Differential    High Sensitivity Troponin T 1Hr    NPO Diet NPO Type: Strict NPO    Undress & Gown    Continuous Pulse Oximetry    Oxygen Therapy- Nasal Cannula; Titrate 1-6 LPM Per SpO2; 90 - 95%    ECG 12 Lead ED Triage Standing Order; Chest Pain    ECG 12 Lead ED Triage Standing Order; Chest Pain    Insert Peripheral IV    CBC & Differential    Green Top (Gel)    Lavender Top    Gold Top - SST    Light Blue Top       Medications Given in the Emergency Department:  Medications   sodium chloride 0.9 % flush 10 mL (has no administration in time range)   aspirin chewable tablet 324 mg (has no administration in time range)        ED Course:         Labs:    Lab Results (last 24 hours)       Procedure Component Value Units Date/Time    High Sensitivity Troponin T [[id number]]  (Normal) Collected: 06/09/25 1044    Specimen: Blood from Arm, Right Updated: 06/09/25 1116     HS Troponin T <6 ng/L     Narrative:      High Sensitive Troponin T Reference Range:  <14.0 ng/L- Negative Female for AMI  <22.0 ng/L- Negative Male for AMI  >=14 - Abnormal Female " indicating possible myocardial injury.  >=22 - Abnormal Male indicating possible myocardial injury.   Clinicians would have to utilize clinical acumen, EKG, Troponin, and serial changes to determine if it is an Acute Myocardial Infarction or myocardial injury due to an underlying chronic condition.         CBC & Differential [[id number]]  (Abnormal) Collected: 06/09/25 1044    Specimen: Blood from Arm, Right Updated: 06/09/25 1055    Narrative:      The following orders were created for panel order CBC & Differential.  Procedure                               Abnormality         Status                     ---------                               -----------         ------                     CBC Auto Differential[[id number]]        Abnormal            Final result                 Please view results for these tests on the individual orders.    Comprehensive Metabolic Panel [[id number]] Collected: 06/09/25 1044    Specimen: Blood from Arm, Right Updated: 06/09/25 1116     Glucose 90 mg/dL      BUN 11.1 mg/dL      Creatinine 0.84 mg/dL      Sodium 138 mmol/L      Potassium 4.1 mmol/L      Chloride 104 mmol/L      CO2 23.5 mmol/L      Calcium 9.0 mg/dL      Total Protein 7.3 g/dL      Albumin 4.6 g/dL      ALT (SGPT) 10 U/L      AST (SGOT) 14 U/L      Alkaline Phosphatase 60 U/L      Total Bilirubin 1.2 mg/dL      Globulin 2.7 gm/dL      A/G Ratio 1.7 g/dL      BUN/Creatinine Ratio 13.2     Anion Gap 10.5 mmol/L      eGFR 89.7 mL/min/1.73     Narrative:      GFR Categories in Chronic Kidney Disease (CKD)              GFR Category          GFR (mL/min/1.73)    Interpretation  G1                    90 or greater        Normal or high (1)  G2                    60-89                Mild decrease (1)  G3a                   45-59                Mild to moderate decrease  G3b                   30-44                Moderate to severe decrease  G4                    15-29                Severe decrease  G5                    14 or  less           Kidney failure    (1)In the absence of evidence of kidney disease, neither GFR category G1 or G2 fulfill the criteria for CKD.    eGFR calculation 2021 CKD-EPI creatinine equation, which does not include race as a factor    Lipase [[id number]]  (Normal) Collected: 06/09/25 1044    Specimen: Blood from Arm, Right Updated: 06/09/25 1116     Lipase 25 U/L     BNP [[id number]]  (Normal) Collected: 06/09/25 1044    Specimen: Blood from Arm, Right Updated: 06/09/25 1114     proBNP 73.0 pg/mL     Narrative:      This assay is used as an aid in the diagnosis of individuals suspected of having heart failure. It can be used as an aid in the diagnosis of acute decompensated heart failure (ADHF) in patients presenting with signs and symptoms of ADHF to the emergency department (ED). In addition, NT-proBNP of <300 pg/mL indicates ADHF is not likely.    Age Range Result Interpretation  NT-proBNP Concentration (pg/mL:      <50             Positive            >450                   Gray                 300-450                    Negative             <300    50-75           Positive            >900                  Gray                300-900                  Negative            <300      >75             Positive            >1800                  Gray                300-1800                  Negative            <300    Magnesium [[id number]]  (Normal) Collected: 06/09/25 1044    Specimen: Blood from Arm, Right Updated: 06/09/25 1116     Magnesium 2.1 mg/dL     CBC Auto Differential [[id number]]  (Abnormal) Collected: 06/09/25 1044    Specimen: Blood from Arm, Right Updated: 06/09/25 1055     WBC 11.68 10*3/mm3      RBC 4.95 10*6/mm3      Hemoglobin 14.2 g/dL      Hematocrit 43.4 %      MCV 87.7 fL      MCH 28.7 pg      MCHC 32.7 g/dL      RDW 12.3 %      RDW-SD 39.9 fl      MPV 9.6 fL      Platelets 263 10*3/mm3      Neutrophil % 81.8 %      Lymphocyte % 9.9 %      Monocyte % 7.1 %      Eosinophil % 0.3 %      Basophil %  0.4 %      Immature Grans % 0.5 %      Neutrophils, Absolute 9.54 10*3/mm3      Lymphocytes, Absolute 1.16 10*3/mm3      Monocytes, Absolute 0.83 10*3/mm3      Eosinophils, Absolute 0.04 10*3/mm3      Basophils, Absolute 0.05 10*3/mm3      Immature Grans, Absolute 0.06 10*3/mm3      nRBC 0.0 /100 WBC              Imaging:    XR Chest 1 View  Result Date: 6/9/2025  XR CHEST 1 VW Date of Exam: 6/9/2025 10:18 AM EDT Indication: Chest Pain Triage Protocol Comparison: Chest radiograph dated 12/20/2024 Findings: The cardiomediastinal silhouette is within normal limits. Pulmonary vascularity appears normal. There is no acute airspace consolidation, pleural effusion, or pneumothorax. There are mild degenerative changes of the thoracic spine.     Impression: No acute cardiopulmonary abnormality. Electronically Signed: Balwinder Mcdonald MD  6/9/2025 10:44 AM EDT  Workstation ID: [id number]      MDM:    Procedures    Labs were collected in the emergency department and all labs were reviewed and interpreted by me.  X-ray were performed in the emergency department and all X-ray impressions were independently interpreted by me.  An EKG was performed and the EKG was interpreted by me.                     Sukhwinder Jorge MD  11:43 EDT  06/09/25         Sukhwinder Jorge MD  06/09/25 1144

## 2025-06-17 LAB
QT INTERVAL: 427 MS
QTC INTERVAL: 495 MS

## (undated) DEVICE — DECANT BG O JET

## (undated) DEVICE — ENCORE® LATEX ORTHO SIZE 8, STERILE LATEX POWDER-FREE SURGICAL GLOVE: Brand: ENCORE

## (undated) DEVICE — SOL NACL 0.9PCT 1000ML

## (undated) DEVICE — ST IRR CYSTO W/SPK 77IN LF

## (undated) DEVICE — LOU D & C HYSTEROSCOPY: Brand: MEDLINE INDUSTRIES, INC.

## (undated) DEVICE — PROB ABL ENDOMTRL NOVASURE/G1 W/SURESND BIP

## (undated) DEVICE — COVER,MAYO STAND,STERILE: Brand: MEDLINE

## (undated) DEVICE — 3M™ STERI-STRIP™ REINFORCED ADHESIVE SKIN CLOSURES, R1546, 1/4 IN X 4 IN (6 MM X 100 MM), 10 STRIPS/ENVELOPE: Brand: 3M™ STERI-STRIP™

## (undated) DEVICE — 3M™ STERI-STRIP™ COMPOUND BENZOIN TINCTURE 40 BAGS/CARTON 4 CARTONS/CASE C1544: Brand: 3M™ STERI-STRIP™

## (undated) DEVICE — ANTIBACTERIAL UNDYED BRAIDED (POLYGLACTIN 910), SYNTHETIC ABSORBABLE SUTURE: Brand: COATED VICRYL

## (undated) DEVICE — CATH FOL SIMPLYLATEX SILELAST 16F 17IN

## (undated) DEVICE — SUT MNCRYL PLS ANTIB UD 4/0 PS2 18IN

## (undated) DEVICE — PAD SANI MAXI W/ADHS SNG WRP 11IN

## (undated) DEVICE — INTENDED FOR TISSUE SEPARATION, AND OTHER PROCEDURES THAT REQUIRE A SHARP SURGICAL BLADE TO PUNCTURE OR CUT.: Brand: BARD-PARKER ® CARBON RIB-BACK BLADES

## (undated) DEVICE — NDL SPINE 18G 31/2IN PNK

## (undated) DEVICE — DRAPE,UNDERBUTTOCKS,PCH,STERILE: Brand: MEDLINE